# Patient Record
Sex: FEMALE | Race: WHITE | NOT HISPANIC OR LATINO | Employment: FULL TIME | URBAN - METROPOLITAN AREA
[De-identification: names, ages, dates, MRNs, and addresses within clinical notes are randomized per-mention and may not be internally consistent; named-entity substitution may affect disease eponyms.]

---

## 2017-02-02 ENCOUNTER — TELEPHONE (OUTPATIENT)
Dept: FAMILY MEDICINE | Facility: CLINIC | Age: 52
End: 2017-02-02

## 2017-02-02 DIAGNOSIS — F98.8 ADD (ATTENTION DEFICIT DISORDER): Primary | ICD-10-CM

## 2017-02-02 RX ORDER — DEXTROAMPHETAMINE SACCHARATE, AMPHETAMINE ASPARTATE MONOHYDRATE, DEXTROAMPHETAMINE SULFATE AND AMPHETAMINE SULFATE 3.75; 3.75; 3.75; 3.75 MG/1; MG/1; MG/1; MG/1
15 CAPSULE, EXTENDED RELEASE ORAL DAILY
Qty: 30 CAPSULE | Refills: 0 | Status: SHIPPED | OUTPATIENT
Start: 2017-02-02 | End: 2017-03-01

## 2017-03-01 ENCOUNTER — MYC MEDICAL ADVICE (OUTPATIENT)
Dept: FAMILY MEDICINE | Facility: CLINIC | Age: 52
End: 2017-03-01

## 2017-03-01 DIAGNOSIS — F98.8 ADD (ATTENTION DEFICIT DISORDER): ICD-10-CM

## 2017-03-01 RX ORDER — DEXTROAMPHETAMINE SACCHARATE, AMPHETAMINE ASPARTATE MONOHYDRATE, DEXTROAMPHETAMINE SULFATE AND AMPHETAMINE SULFATE 3.75; 3.75; 3.75; 3.75 MG/1; MG/1; MG/1; MG/1
15 CAPSULE, EXTENDED RELEASE ORAL DAILY
Qty: 30 CAPSULE | Refills: 0 | Status: SHIPPED | OUTPATIENT
Start: 2017-03-01 | End: 2017-04-03

## 2017-03-09 ENCOUNTER — OFFICE VISIT (OUTPATIENT)
Dept: FAMILY MEDICINE | Facility: CLINIC | Age: 52
End: 2017-03-09
Payer: COMMERCIAL

## 2017-03-09 VITALS
HEART RATE: 105 BPM | OXYGEN SATURATION: 94 % | BODY MASS INDEX: 41.62 KG/M2 | HEIGHT: 66 IN | RESPIRATION RATE: 14 BRPM | SYSTOLIC BLOOD PRESSURE: 132 MMHG | TEMPERATURE: 99 F | DIASTOLIC BLOOD PRESSURE: 86 MMHG | WEIGHT: 259 LBS

## 2017-03-09 DIAGNOSIS — E66.01 MORBID OBESITY DUE TO EXCESS CALORIES (H): ICD-10-CM

## 2017-03-09 DIAGNOSIS — L30.9 ECZEMA OF BOTH HANDS: ICD-10-CM

## 2017-03-09 DIAGNOSIS — E03.4 HYPOTHYROIDISM DUE TO ACQUIRED ATROPHY OF THYROID: ICD-10-CM

## 2017-03-09 DIAGNOSIS — F98.8 ADD (ATTENTION DEFICIT DISORDER): Primary | ICD-10-CM

## 2017-03-09 PROCEDURE — 99214 OFFICE O/P EST MOD 30 MIN: CPT | Performed by: FAMILY MEDICINE

## 2017-03-09 ASSESSMENT — ANXIETY QUESTIONNAIRES
GAD7 TOTAL SCORE: 3
1. FEELING NERVOUS, ANXIOUS, OR ON EDGE: NOT AT ALL
2. NOT BEING ABLE TO STOP OR CONTROL WORRYING: NOT AT ALL
6. BECOMING EASILY ANNOYED OR IRRITABLE: SEVERAL DAYS
3. WORRYING TOO MUCH ABOUT DIFFERENT THINGS: NOT AT ALL
5. BEING SO RESTLESS THAT IT IS HARD TO SIT STILL: SEVERAL DAYS
IF YOU CHECKED OFF ANY PROBLEMS ON THIS QUESTIONNAIRE, HOW DIFFICULT HAVE THESE PROBLEMS MADE IT FOR YOU TO DO YOUR WORK, TAKE CARE OF THINGS AT HOME, OR GET ALONG WITH OTHER PEOPLE: NOT DIFFICULT AT ALL
7. FEELING AFRAID AS IF SOMETHING AWFUL MIGHT HAPPEN: NOT AT ALL

## 2017-03-09 ASSESSMENT — PATIENT HEALTH QUESTIONNAIRE - PHQ9: 5. POOR APPETITE OR OVEREATING: SEVERAL DAYS

## 2017-03-09 NOTE — NURSING NOTE
"Chief Complaint   Patient presents with     Refill Request       Initial /86  Pulse 105  Temp 99  F (37.2  C) (Oral)  Resp 14  Ht 5' 6.25\" (1.683 m)  Wt 259 lb (117.5 kg)  SpO2 94%  BMI 41.49 kg/m2 Estimated body mass index is 41.49 kg/(m^2) as calculated from the following:    Height as of this encounter: 5' 6.25\" (1.683 m).    Weight as of this encounter: 259 lb (117.5 kg).  Medication Reconciliation: complete     Nubia Andino        "

## 2017-03-09 NOTE — MR AVS SNAPSHOT
"              After Visit Summary   3/9/2017    Brittany Rooney    MRN: [id number]           Patient Information     Date Of Birth          1965        Visit Information        Provider Department      3/9/2017 3:40 PM Esther Armstrong MD [facility]        Today's Diagnoses     Visit for screening mammogram    -  1      Care Instructions    Follow up in Summer for annual exam.    I will send Adderall monthly to pharmacy as previous.            Follow-ups after your visit        Who to contact     If you have questions or need follow up information about today's clinic visit or your schedule please contact [facility] directly at [phone].  Normal or non-critical lab and imaging results will be communicated to you by MyChart, letter or phone within 4 business days after the clinic has received the results. If you do not hear from us within 7 days, please contact the clinic through [employer] or phone. If you have a critical or abnormal lab result, we will notify you by phone as soon as possible.  Submit refill requests through [employer] or call your pharmacy and they will forward the refill request to us. Please allow 3 business days for your refill to be completed.          Additional Information About Your Visit        MyChart Information     [employer] gives you secure access to your electronic health record. If you see a primary care provider, you can also send messages to your care team and make appointments. If you have questions, please call your primary care clinic.  If you do not have a primary care provider, please call [phone] and they will assist you.        Care EveryWhere ID     This is your Care EveryWhere ID. This could be used by other organizations to access your [place] medical records  [id number]        Your Vitals Were     Pulse Temperature Respirations Height Pulse Oximetry BMI (Body Mass Index)    105 99  F (37.2  C) (Oral) 14 1.683 m (5' 6.25\") 94% " 41.49 kg/m2       Blood Pressure from Last 3 Encounters:   03/09/17 132/86   06/02/16 140/78   11/16/15 120/82    Weight from Last 3 Encounters:   03/09/17 117.5 kg (259 lb)   06/02/16 113.9 kg (251 lb 1.6 oz)   11/16/15 110 kg (242 lb 9.6 oz)              Today, you had the following     No orders found for display       Primary Care Provider Office Phone # Fax #    Esther Armstrong [phone]       [facility] [address] [place] [address] [address]  [facility] [address]        Thank you!     Thank you for choosing [facility]  for your care. Our goal is always to provide you with excellent care. Hearing back from our patients is one way we can continue to improve our services. Please take a few minutes to complete the written survey that you may receive in the mail after your visit with us. Thank you!             Your Updated Medication List - Protect others around you: Learn how to safely use, store and throw away your medicines at www.disposemymeds.org.          This list is accurate as of: 3/9/17  4:25 PM.  Always use your most recent med list.                   Brand Name Dispense Instructions for use    amphetamine-dextroamphetamine 15 MG per 24 hr capsule    ADDERALL XR    30 capsule    Take 1 capsule (15 mg) by mouth daily       clobetasol 0.05 % ointment    TEMOVATE    30 g    APPLY EXTERNALLY TO THE AFFECTED AREA TWICE DAILY       FLUoxetine 20 MG capsule    PROzac    90 capsule    TAKE ONE CAPSULE BY MOUTH EVERY DAY       ibuprofen 200 MG capsule      2 CAPSULE EVERY 4 TO 6 HOURS AS NEEDED       levothyroxine 125 MCG tablet    SYNTHROID/LEVOTHROID    90 tablet    Take 1 tablet (125 mcg) by mouth daily       mometasone 0.1 % cream    ELOCON    45 g    Apply  topically as needed.       vitamin D 2000 UNITS tablet     100 tablet    Take 1 tablet by mouth 2 times daily.

## 2017-03-09 NOTE — PROGRESS NOTES
"  SUBJECTIVE:                                                    Brittany Rooney is a 51 year old female who presents to clinic today for the following health issues:      Hyperlipidemia Follow-Up      Rate your low fat/cholesterol diet?: good    Taking statin?  No    Other lipid medications/supplements?:  none             Hypothyroidism Follow-up      Since last visit, patient describes the following symptoms: dry skin - thinks winter weather- primarily hands uses temovate ointment with good results       Amount of exercise or physical activity: None    Problems taking medications regularly: No    Medication side effects: none  Diet: regular (no restrictions)    Anxiety Follow-Up    Status since last visit: No change    Other associated symptoms:None    Complicating factors:   Significant life event: No   Current substance abuse: None  Depression symptoms: No  GALE-7 SCORE 4/22/2015 11/16/2015 3/9/2017   Total Score 11 - -   Total Score - 8 3      PHQ-9 SCORE 4/22/2015 11/16/2015 3/9/2017   Total Score 2 - -   Total Score - 1 2     GAD7                   Problem list and histories reviewed & adjusted, as indicated.  Additional history: as documented    BP Readings from Last 3 Encounters:   03/09/17 132/86   06/02/16 140/78   11/16/15 120/82    Wt Readings from Last 3 Encounters:   03/09/17 259 lb (117.5 kg)   06/02/16 251 lb 1.6 oz (113.9 kg)   11/16/15 242 lb 9.6 oz (110 kg)                    Reviewed and updated as needed this visit by clinical staff  Tobacco  Allergies  Meds  Med Hx  Surg Hx  Fam Hx  Soc Hx      Reviewed and updated as needed this visit by Provider  Tobacco  Allergies  Meds  Problems  Med Hx  Surg Hx  Fam Hx  Soc Hx          ROS:  Constitutional, HEENT, cardiovascular, pulmonary, gi and gu systems are negative, except as otherwise noted.    OBJECTIVE:                                                    /86  Pulse 105  Temp 99  F (37.2  C) (Oral)  Resp 14  Ht 5' 6.25\" (1.683 " "m)  Wt 259 lb (117.5 kg)  SpO2 94%  BMI 41.49 kg/m2  Body mass index is 41.49 kg/(m^2).  GENERAL: alert, no distress and obese  NECK: no adenopathy, no asymmetry, masses, or scars and thyroid normal to palpation  RESP: lungs clear to auscultation - no rales, rhonchi or wheezes  CV: regular rate and rhythm, normal S1 S2, no S3 or S4, no murmur, click or rub, no peripheral edema and peripheral pulses strong  MS: no gross musculoskeletal defects noted, no edema  SKIN: no suspicious lesions or rashes and hands with cracking, dryness, no moist drainage, no scaling.   PSYCH: mentation appears normal, affect normal/bright, fatigued, judgement and insight intact and appearance well groomed         ASSESSMENT/PLAN:                                                        BMI:   Estimated body mass index is 41.49 kg/(m^2) as calculated from the following:    Height as of this encounter: 1.683 m (5' 6.25\").    Weight as of this encounter: 117.5 kg (259 lb).   Weight management plan: Discussed healthy diet and exercise guidelines and patient will follow up in 6 months in clinic to re-evaluate.      1. ADD (attention deficit disorder)  On adderall XR - tolerated well.  Follow up in 6 months.  Monthly scripts will be sent.     2. Hypothyroidism due to acquired atrophy of thyroid  Due for recheck labs with annual in June    3. Morbid obesity due to excess calories (H)  Increasing weight noted.  Consider medication if diet and exercise not effective by annual exam.     4. Eczema of both hands  Temovate cream prn       Patient Instructions   Follow up in Summer for annual exam.    I will send Adderall monthly to pharmacy as previous.          Esther Armstrong MD  [facility]"

## 2017-03-10 PROBLEM — E66.01 MORBID OBESITY DUE TO EXCESS CALORIES (H): Status: ACTIVE | Noted: 2017-03-10

## 2017-03-10 ASSESSMENT — ANXIETY QUESTIONNAIRES: GAD7 TOTAL SCORE: 3

## 2017-03-10 ASSESSMENT — PATIENT HEALTH QUESTIONNAIRE - PHQ9: SUM OF ALL RESPONSES TO PHQ QUESTIONS 1-9: 2

## 2017-04-03 ENCOUNTER — MYC MEDICAL ADVICE (OUTPATIENT)
Dept: FAMILY MEDICINE | Facility: CLINIC | Age: 52
End: 2017-04-03

## 2017-04-03 DIAGNOSIS — F98.8 ADD (ATTENTION DEFICIT DISORDER): ICD-10-CM

## 2017-04-03 RX ORDER — DEXTROAMPHETAMINE SACCHARATE, AMPHETAMINE ASPARTATE MONOHYDRATE, DEXTROAMPHETAMINE SULFATE AND AMPHETAMINE SULFATE 3.75; 3.75; 3.75; 3.75 MG/1; MG/1; MG/1; MG/1
15 CAPSULE, EXTENDED RELEASE ORAL DAILY
Qty: 30 CAPSULE | Refills: 0 | Status: SHIPPED | OUTPATIENT
Start: 2017-04-03 | End: 2017-05-02

## 2017-05-02 ENCOUNTER — MYC MEDICAL ADVICE (OUTPATIENT)
Dept: FAMILY MEDICINE | Facility: CLINIC | Age: 52
End: 2017-05-02

## 2017-05-02 DIAGNOSIS — F98.8 ADD (ATTENTION DEFICIT DISORDER): ICD-10-CM

## 2017-05-02 RX ORDER — DEXTROAMPHETAMINE SACCHARATE, AMPHETAMINE ASPARTATE MONOHYDRATE, DEXTROAMPHETAMINE SULFATE AND AMPHETAMINE SULFATE 3.75; 3.75; 3.75; 3.75 MG/1; MG/1; MG/1; MG/1
15 CAPSULE, EXTENDED RELEASE ORAL DAILY
Qty: 30 CAPSULE | Refills: 0 | Status: SHIPPED | OUTPATIENT
Start: 2017-05-02 | End: 2017-06-01

## 2017-06-01 ENCOUNTER — MYC MEDICAL ADVICE (OUTPATIENT)
Dept: FAMILY MEDICINE | Facility: CLINIC | Age: 52
End: 2017-06-01

## 2017-06-01 DIAGNOSIS — F98.8 ADD (ATTENTION DEFICIT DISORDER): ICD-10-CM

## 2017-06-01 RX ORDER — DEXTROAMPHETAMINE SACCHARATE, AMPHETAMINE ASPARTATE MONOHYDRATE, DEXTROAMPHETAMINE SULFATE AND AMPHETAMINE SULFATE 3.75; 3.75; 3.75; 3.75 MG/1; MG/1; MG/1; MG/1
15 CAPSULE, EXTENDED RELEASE ORAL DAILY
Qty: 30 CAPSULE | Refills: 0 | Status: SHIPPED | OUTPATIENT
Start: 2017-06-01 | End: 2017-07-03

## 2017-06-03 DIAGNOSIS — E03.4 HYPOTHYROIDISM DUE TO ACQUIRED ATROPHY OF THYROID: ICD-10-CM

## 2017-06-05 RX ORDER — LEVOTHYROXINE SODIUM 125 UG/1
TABLET ORAL
Qty: 30 TABLET | Refills: 0 | Status: SHIPPED | OUTPATIENT
Start: 2017-06-05 | End: 2017-08-04

## 2017-06-05 NOTE — TELEPHONE ENCOUNTER
Medication is being filled for 1 time refill only due to:  Patient needs labs TSH. Janet Rebollar RN

## 2017-06-05 NOTE — TELEPHONE ENCOUNTER
levothyroxine (SYNTHROID, LEVOTHROID) 125 MCG tablet     Last Written Prescription Date: 6/2/16  Last Quantity: 90, # refills: 3  Last Office Visit with [facility], [facility] or [facility] prescribing provider: 3/9/17 Dr. Armstrong          TSH   Date Value Ref Range Status   06/02/2016 1.46 0.40 - 4.00 mU/L Final

## 2017-06-13 DIAGNOSIS — L30.9 ECZEMA OF BOTH HANDS: ICD-10-CM

## 2017-06-13 NOTE — TELEPHONE ENCOUNTER
clobetasol (TEMOVATE) 0.05 % ointment      Last Written Prescription Date: 12/22/16  Last Fill Quantity: 30g,  # refills: 0   Last Office Visit with [facility], P or [facility] prescribing provider: 3/9/17 Dr. Armstrong

## 2017-06-16 RX ORDER — CLOBETASOL PROPIONATE 0.5 MG/G
OINTMENT TOPICAL
Qty: 30 G | Refills: 0 | Status: SHIPPED | OUTPATIENT
Start: 2017-06-16 | End: 2017-12-05

## 2017-06-16 NOTE — TELEPHONE ENCOUNTER
Last OV notes reviewed, due for annual exam this month.      Medication is being filled for 1 time mildred refill only due to:  Patient due to be seen in clinic for provider visit.    Nael Obrien RN

## 2017-07-03 ENCOUNTER — MYC MEDICAL ADVICE (OUTPATIENT)
Dept: FAMILY MEDICINE | Facility: CLINIC | Age: 52
End: 2017-07-03

## 2017-07-03 DIAGNOSIS — F98.8 ATTENTION DEFICIT DISORDER (ADD) WITHOUT HYPERACTIVITY: Primary | ICD-10-CM

## 2017-07-03 RX ORDER — DEXTROAMPHETAMINE SACCHARATE, AMPHETAMINE ASPARTATE MONOHYDRATE, DEXTROAMPHETAMINE SULFATE AND AMPHETAMINE SULFATE 3.75; 3.75; 3.75; 3.75 MG/1; MG/1; MG/1; MG/1
15 CAPSULE, EXTENDED RELEASE ORAL DAILY
Qty: 30 CAPSULE | Refills: 0 | Status: SHIPPED | OUTPATIENT
Start: 2017-07-03 | End: 2017-08-01

## 2017-08-01 ENCOUNTER — MYC MEDICAL ADVICE (OUTPATIENT)
Dept: FAMILY MEDICINE | Facility: CLINIC | Age: 52
End: 2017-08-01

## 2017-08-01 DIAGNOSIS — F98.8 ATTENTION DEFICIT DISORDER (ADD) WITHOUT HYPERACTIVITY: ICD-10-CM

## 2017-08-01 RX ORDER — DEXTROAMPHETAMINE SACCHARATE, AMPHETAMINE ASPARTATE MONOHYDRATE, DEXTROAMPHETAMINE SULFATE AND AMPHETAMINE SULFATE 3.75; 3.75; 3.75; 3.75 MG/1; MG/1; MG/1; MG/1
15 CAPSULE, EXTENDED RELEASE ORAL DAILY
Qty: 30 CAPSULE | Refills: 0 | Status: SHIPPED | OUTPATIENT
Start: 2017-08-01 | End: 2017-08-31

## 2017-08-01 NOTE — TELEPHONE ENCOUNTER
Rx mailed to [facility].    Gina STEVENSON, Patient Care      Followed protocol

## 2017-08-04 ENCOUNTER — MYC REFILL (OUTPATIENT)
Dept: FAMILY MEDICINE | Facility: CLINIC | Age: 52
End: 2017-08-04

## 2017-08-04 DIAGNOSIS — E03.4 HYPOTHYROIDISM DUE TO ACQUIRED ATROPHY OF THYROID: ICD-10-CM

## 2017-08-04 RX ORDER — LEVOTHYROXINE SODIUM 125 UG/1
125 TABLET ORAL DAILY
Qty: 30 TABLET | Refills: 0 | Status: CANCELLED | OUTPATIENT
Start: 2017-08-04

## 2017-08-04 NOTE — TELEPHONE ENCOUNTER
Message from MyChart:  Original authorizing provider: MD ANJELICA Ferrara would like a refill of the following medications:  levothyroxine (SYNTHROID/LEVOTHROID) 125 MCG tablet [Esther Armstrong MD]    Preferred pharmacy: [facility] [address] [place] [address] [address] AT [facility] [address]    Comment:

## 2017-08-31 ENCOUNTER — MYC MEDICAL ADVICE (OUTPATIENT)
Dept: FAMILY MEDICINE | Facility: CLINIC | Age: 52
End: 2017-08-31

## 2017-08-31 DIAGNOSIS — F98.8 ATTENTION DEFICIT DISORDER (ADD) WITHOUT HYPERACTIVITY: ICD-10-CM

## 2017-08-31 RX ORDER — DEXTROAMPHETAMINE SACCHARATE, AMPHETAMINE ASPARTATE MONOHYDRATE, DEXTROAMPHETAMINE SULFATE AND AMPHETAMINE SULFATE 3.75; 3.75; 3.75; 3.75 MG/1; MG/1; MG/1; MG/1
15 CAPSULE, EXTENDED RELEASE ORAL DAILY
Qty: 30 CAPSULE | Refills: 0 | Status: SHIPPED | OUTPATIENT
Start: 2017-08-31 | End: 2017-10-03

## 2017-09-04 DIAGNOSIS — E03.4 HYPOTHYROIDISM DUE TO ACQUIRED ATROPHY OF THYROID: ICD-10-CM

## 2017-09-04 RX ORDER — LEVOTHYROXINE SODIUM 125 UG/1
TABLET ORAL
Qty: 30 TABLET | Refills: 0 | Status: CANCELLED | OUTPATIENT
Start: 2017-09-04

## 2017-09-05 NOTE — TELEPHONE ENCOUNTER
levothyroxine (SYNTHROID/LEVOTHROID) 125 MCG tablet     Last Written Prescription Date: 8/4/17  Last Quantity: 30, # refills: 0  Last Office Visit with [facility], P or [facility] prescribing provider: 3/9/17 Dr. Armstrong        TSH   Date Value Ref Range Status   06/02/2016 1.46 0.40 - 4.00 mU/L Final

## 2017-09-06 ENCOUNTER — MYC MEDICAL ADVICE (OUTPATIENT)
Dept: FAMILY MEDICINE | Facility: CLINIC | Age: 52
End: 2017-09-06

## 2017-09-06 DIAGNOSIS — E03.4 HYPOTHYROIDISM DUE TO ACQUIRED ATROPHY OF THYROID: ICD-10-CM

## 2017-09-07 RX ORDER — LEVOTHYROXINE SODIUM 125 UG/1
125 TABLET ORAL DAILY
Qty: 30 TABLET | Refills: 0 | Status: SHIPPED | OUTPATIENT
Start: 2017-09-07 | End: 2017-10-10

## 2017-09-07 NOTE — TELEPHONE ENCOUNTER
levothyroxine (SYNTHROID/LEVOTHROID) 125 MCG tablet     Last Written Prescription Date: 8/4/17  Last Quantity: 30, # refills: 0  Last Office Visit with [facility], [facility] or [facility] prescribing provider: Nathaniel   Next 5 appointments (look out 90 days)     Sep 19, 2017  7:20 AM CDT   Hilary Hercules [facility]    [address] [place] [id number] [phone]                   TSH   Date Value Ref Range Status   06/02/2016 1.46 0.40 - 4.00 mU/L Final     Patient is due for labs and an appointment. Scheduled to be seen 9/16/17, labs are entered to have done prior to appointment.  Gabbie refill sent for #30 with 0 refills. Patient needs to be seen before further refills.  Janet Morales RN.

## 2017-10-03 ENCOUNTER — MYC MEDICAL ADVICE (OUTPATIENT)
Dept: FAMILY MEDICINE | Facility: CLINIC | Age: 52
End: 2017-10-03

## 2017-10-03 DIAGNOSIS — F98.8 ATTENTION DEFICIT DISORDER (ADD) WITHOUT HYPERACTIVITY: ICD-10-CM

## 2017-10-03 RX ORDER — DEXTROAMPHETAMINE SACCHARATE, AMPHETAMINE ASPARTATE MONOHYDRATE, DEXTROAMPHETAMINE SULFATE AND AMPHETAMINE SULFATE 3.75; 3.75; 3.75; 3.75 MG/1; MG/1; MG/1; MG/1
15 CAPSULE, EXTENDED RELEASE ORAL DAILY
Qty: 30 CAPSULE | Refills: 0 | Status: SHIPPED | OUTPATIENT
Start: 2017-10-03 | End: 2017-11-02

## 2017-10-10 ENCOUNTER — OFFICE VISIT (OUTPATIENT)
Dept: FAMILY MEDICINE | Facility: CLINIC | Age: 52
End: 2017-10-10
Payer: COMMERCIAL

## 2017-10-10 VITALS
WEIGHT: 259 LBS | HEIGHT: 66 IN | RESPIRATION RATE: 17 BRPM | DIASTOLIC BLOOD PRESSURE: 82 MMHG | BODY MASS INDEX: 41.62 KG/M2 | HEART RATE: 62 BPM | SYSTOLIC BLOOD PRESSURE: 128 MMHG | TEMPERATURE: 98.8 F | OXYGEN SATURATION: 99 %

## 2017-10-10 DIAGNOSIS — M76.61 RIGHT ACHILLES TENDINITIS: ICD-10-CM

## 2017-10-10 DIAGNOSIS — F43.22 ADJUSTMENT DISORDER WITH ANXIETY: ICD-10-CM

## 2017-10-10 DIAGNOSIS — Z00.00 ROUTINE GENERAL MEDICAL EXAMINATION AT A HEALTH CARE FACILITY: Primary | ICD-10-CM

## 2017-10-10 DIAGNOSIS — D23.9 DERMATOFIBROMA: ICD-10-CM

## 2017-10-10 DIAGNOSIS — F98.8 ATTENTION DEFICIT DISORDER (ADD) WITHOUT HYPERACTIVITY: ICD-10-CM

## 2017-10-10 DIAGNOSIS — E78.5 HYPERLIPIDEMIA LDL GOAL <130: ICD-10-CM

## 2017-10-10 DIAGNOSIS — Z12.31 VISIT FOR SCREENING MAMMOGRAM: ICD-10-CM

## 2017-10-10 DIAGNOSIS — Z12.11 SCREEN FOR COLON CANCER: ICD-10-CM

## 2017-10-10 DIAGNOSIS — E03.4 HYPOTHYROIDISM DUE TO ACQUIRED ATROPHY OF THYROID: ICD-10-CM

## 2017-10-10 LAB
ALBUMIN SERPL-MCNC: 3.6 G/DL (ref 3.4–5)
ALP SERPL-CCNC: 60 U/L (ref 40–150)
ALT SERPL W P-5'-P-CCNC: 22 U/L (ref 0–50)
ANION GAP SERPL CALCULATED.3IONS-SCNC: 5 MMOL/L (ref 3–14)
AST SERPL W P-5'-P-CCNC: 12 U/L (ref 0–45)
BILIRUB SERPL-MCNC: 0.4 MG/DL (ref 0.2–1.3)
BUN SERPL-MCNC: 14 MG/DL (ref 7–30)
CALCIUM SERPL-MCNC: 8.3 MG/DL (ref 8.5–10.1)
CHLORIDE SERPL-SCNC: 106 MMOL/L (ref 94–109)
CHOLEST SERPL-MCNC: 183 MG/DL
CO2 SERPL-SCNC: 28 MMOL/L (ref 20–32)
CREAT SERPL-MCNC: 0.62 MG/DL (ref 0.52–1.04)
GFR SERPL CREATININE-BSD FRML MDRD: >90 ML/MIN/1.7M2
GLUCOSE SERPL-MCNC: 98 MG/DL (ref 70–99)
HDLC SERPL-MCNC: 54 MG/DL
HGB BLD-MCNC: 12.8 G/DL (ref 11.7–15.7)
LDLC SERPL CALC-MCNC: 109 MG/DL
NONHDLC SERPL-MCNC: 129 MG/DL
POTASSIUM SERPL-SCNC: 4.1 MMOL/L (ref 3.4–5.3)
PROT SERPL-MCNC: 6.9 G/DL (ref 6.8–8.8)
SODIUM SERPL-SCNC: 139 MMOL/L (ref 133–144)
TRIGL SERPL-MCNC: 102 MG/DL
TSH SERPL DL<=0.005 MIU/L-ACNC: 1.51 MU/L (ref 0.4–4)

## 2017-10-10 PROCEDURE — 80053 COMPREHEN METABOLIC PANEL: CPT | Performed by: FAMILY MEDICINE

## 2017-10-10 PROCEDURE — 80061 LIPID PANEL: CPT | Performed by: FAMILY MEDICINE

## 2017-10-10 PROCEDURE — 99396 PREV VISIT EST AGE 40-64: CPT | Performed by: FAMILY MEDICINE

## 2017-10-10 PROCEDURE — 85018 HEMOGLOBIN: CPT | Performed by: FAMILY MEDICINE

## 2017-10-10 PROCEDURE — 84443 ASSAY THYROID STIM HORMONE: CPT | Performed by: FAMILY MEDICINE

## 2017-10-10 PROCEDURE — 36415 COLL VENOUS BLD VENIPUNCTURE: CPT | Performed by: FAMILY MEDICINE

## 2017-10-10 RX ORDER — LEVOTHYROXINE SODIUM 125 UG/1
125 TABLET ORAL DAILY
Qty: 90 TABLET | Refills: 3 | Status: SHIPPED | OUTPATIENT
Start: 2017-10-10 | End: 2018-10-26

## 2017-10-10 RX ORDER — LEVOTHYROXINE SODIUM 125 UG/1
125 TABLET ORAL DAILY
Qty: 30 TABLET | Refills: 0 | Status: SHIPPED | OUTPATIENT
Start: 2017-10-10 | End: 2017-10-10

## 2017-10-10 ASSESSMENT — PATIENT HEALTH QUESTIONNAIRE - PHQ9
SUM OF ALL RESPONSES TO PHQ QUESTIONS 1-9: 1
5. POOR APPETITE OR OVEREATING: SEVERAL DAYS

## 2017-10-10 ASSESSMENT — ANXIETY QUESTIONNAIRES
7. FEELING AFRAID AS IF SOMETHING AWFUL MIGHT HAPPEN: SEVERAL DAYS
5. BEING SO RESTLESS THAT IT IS HARD TO SIT STILL: SEVERAL DAYS
1. FEELING NERVOUS, ANXIOUS, OR ON EDGE: SEVERAL DAYS
2. NOT BEING ABLE TO STOP OR CONTROL WORRYING: MORE THAN HALF THE DAYS
GAD7 TOTAL SCORE: 9
6. BECOMING EASILY ANNOYED OR IRRITABLE: SEVERAL DAYS
IF YOU CHECKED OFF ANY PROBLEMS ON THIS QUESTIONNAIRE, HOW DIFFICULT HAVE THESE PROBLEMS MADE IT FOR YOU TO DO YOUR WORK, TAKE CARE OF THINGS AT HOME, OR GET ALONG WITH OTHER PEOPLE: SOMEWHAT DIFFICULT
3. WORRYING TOO MUCH ABOUT DIFFERENT THINGS: MORE THAN HALF THE DAYS

## 2017-10-10 ASSESSMENT — PAIN SCALES - GENERAL: PAINLEVEL: NO PAIN (0)

## 2017-10-10 NOTE — PROGRESS NOTES
Your thyroid testing is normal.   Your cholesterol is improved from previous. It is in the goal range.    Your blood sugar is normal.  Your kidney function and liver function are normal.   Your calcium is borderline low.  Be sure you are taking vitamin D for the fall and winter.   Your hemoglobin is normal.  Please call or MyChart message me if you have any questions.    PSK

## 2017-10-10 NOTE — MR AVS SNAPSHOT
After Visit Summary   10/10/2017    Brittany Rooney    MRN: [id number]           Patient Information     Date Of Birth          1965        Visit Information        Provider Department      10/10/2017 7:20 AM Esther Armstrong MD [facility]        Today's Diagnoses     Routine general medical examination at a health care facility    -  1    Hypothyroidism due to acquired atrophy of thyroid        Screen for colon cancer        Visit for screening mammogram        Hyperlipidemia LDL goal <130        Attention deficit disorder (ADD) without hyperactivity        Adjustment disorder with anxiety        Dermatofibroma        Right Achilles tendinitis          Care Instructions    Fasting labs.  Refill thyroid medication for 1 month now - additional refills after labs return.  Mammogram recommended.   You can call [phone] to schedule this at the [facility]    Colonoscopy recommended.   You can call [phone] to schedule this at the [facility]      Stretching exercises heel recommended.  Consider PHYSICAL THERAPY if persistent symptoms.    You can return to clinic for lesion removal left knee area if desired.          Preventive Health Recommendations  Female Ages 50 - 64    Yearly exam: See your health care provider every year in order to  o Review health changes.   o Discuss preventive care.    o Review your medicines if your doctor has prescribed any.      Get a Pap test every three years (unless you have an abnormal result and your provider advises testing more often).    If you get Pap tests with HPV test, you only need to test every 5 years, unless you have an abnormal result.     You do not need a Pap test if your uterus was removed (hysterectomy) and you have not had cancer.    You should be tested each year  for STDs (sexually transmitted diseases) if you're at risk.     Have a mammogram every 1 to 2 years.    Have a colonoscopy at age 50, or have a yearly FIT test (stool test). These exams screen for colon cancer.      Have a cholesterol test every 5 years, or more often if advised.    Have a diabetes test (fasting glucose) every three years. If you are at risk for diabetes, you should have this test more often.     If you are at risk for osteoporosis (brittle bone disease), think about having a bone density scan (DEXA).    Shots: Get a flu shot each year. Get a tetanus shot every 10 years.    Nutrition:     Eat at least 5 servings of fruits and vegetables each day.    Eat whole-grain bread, whole-wheat pasta and brown rice instead of white grains and rice.    Talk to your provider about Calcium and Vitamin D.     Lifestyle    Exercise at least 150 minutes a week (30 minutes a day, 5 days a week). This will help you control your weight and prevent disease.    Limit alcohol to one drink per day.    No smoking.     Wear sunscreen to prevent skin cancer.     See your dentist every six months for an exam and cleaning.    See your eye doctor every 1 to 2 years.            Follow-ups after your visit        Additional Services     GASTROENTEROLOGY ADULT REF PROCEDURE ONLY       Last Lab Result: Creatinine (mg/dL)       Date                     Value                 06/02/2016               0.64             ----------  Body mass index is 41.49 kg/(m^2).     Needed:  No  Language:  English    Patient will be contacted to schedule procedure.     Please be aware that coverage of these services is subject to the terms and limitations of your health insurance plan.  Call member services at your health plan with any benefit or coverage questions.  Any procedures must be performed at a [facility] facility OR coordinated by your clinic's referral office.    Please bring the following with you to your appointment:    (1) Any  "X-Rays, CTs or MRIs which have been performed.  Contact the facility where they were done to arrange for  prior to your scheduled appointment.    (2) List of current medications   (3) This referral request   (4) Any documents/labs given to you for this referral                  Future tests that were ordered for you today     Open Future Orders        Priority Expected Expires Ordered    MA SCREENING DIGITAL BILAT - Future  (s+30) Routine  10/10/2018 10/10/2017            Who to contact     If you have questions or need follow up information about today's clinic visit or your schedule please contact [facility] directly at [phone].  Normal or non-critical lab and imaging results will be communicated to you by MyChart, letter or phone within 4 business days after the clinic has received the results. If you do not hear from us within 7 days, please contact the clinic through [employer] or phone. If you have a critical or abnormal lab result, we will notify you by phone as soon as possible.  Submit refill requests through [employer] or call your pharmacy and they will forward the refill request to us. Please allow 3 business days for your refill to be completed.          Additional Information About Your Visit        [employer] Information     [employer] gives you secure access to your electronic health record. If you see a primary care provider, you can also send messages to your care team and make appointments. If you have questions, please call your primary care clinic.  If you do not have a primary care provider, please call [phone] and they will assist you.        Care EveryWhere ID     This is your Care EveryWhere ID. This could be used by other organizations to access your [place] medical records  [id number]        Your Vitals Were     Pulse Temperature Respirations Height Last Period Pulse Oximetry    62 98.8  F (37.1  C) (Oral) 17 1.683 m (5' 6.25\") 09/22/2017 (Approximate) 99%    " Breastfeeding? BMI (Body Mass Index)                No 41.49 kg/m2           Blood Pressure from Last 3 Encounters:   10/10/17 128/82   03/09/17 132/86   06/02/16 140/78    Weight from Last 3 Encounters:   10/10/17 117.5 kg (259 lb)   03/09/17 117.5 kg (259 lb)   06/02/16 113.9 kg (251 lb 1.6 oz)              We Performed the Following     Comprehensive metabolic panel (BMP + Alb, Alk Phos, ALT, AST, Total. Bili, TP)     GASTROENTEROLOGY ADULT REF PROCEDURE ONLY     Hemoglobin     Lipid panel reflex to direct LDL     TSH WITH FREE T4 REFLEX          Where to get your medicines      These medications were sent to [id number] - [place], [place] - [address] AT [facility] [address] [facility] [address]     Phone:  [phone]     levothyroxine 125 MCG tablet          Primary Care Provider Office Phone # Fax #    Esther Armstrong [phone] [address] [facility] [address]  [facility] [address]        Equal Access to Services     [facility]: Hadii aad ku hadasho Soomaali, waaxda luqadaha, qaybta kaalmada adeegyada, misael gaspar hayaishan carleen bautista . So [facility] [phone].    ATENCIÓN: Si habla español, tiene a dumont disposición servicios gratuitos de asistencia lingüística. [facility] [phone].    We comply with applicable federal civil rights laws and [place] laws. We do not discriminate on the basis of race, color, national origin, age, disability, sex, sexual orientation, or gender identity.            Thank you!     Thank you for choosing [facility]  for your care. Our goal is always to provide you with excellent care. Hearing back from our patients is one way we can continue to improve our services. Please take a few minutes to complete the written survey that you may receive in the mail after your visit with us. Thank you!             Your Updated Medication List - Protect others around you: Learn how to safely use,  store and throw away your medicines at www.disposemymeds.org.          This list is accurate as of: 10/10/17  8:06 AM.  Always use your most recent med list.                   Brand Name Dispense Instructions for use Diagnosis    amphetamine-dextroamphetamine 15 MG per 24 hr capsule    ADDERALL XR    30 capsule    Take 1 capsule (15 mg) by mouth daily    Attention deficit disorder (ADD) without hyperactivity       clobetasol 0.05 % ointment    TEMOVATE    30 g    APPLY EXTERNALLY TO THE AFFECTED AREA TWICE DAILY    Eczema of both hands       FLUoxetine 20 MG capsule    PROzac    90 capsule    TAKE ONE CAPSULE BY MOUTH EVERY DAY    Adjustment disorder with anxiety       ibuprofen 200 MG capsule      2 CAPSULE EVERY 4 TO 6 HOURS AS NEEDED    Other malaise and fatigue       levothyroxine 125 MCG tablet    SYNTHROID/LEVOTHROID    30 tablet    Take 1 tablet (125 mcg) by mouth daily    Hypothyroidism due to acquired atrophy of thyroid       mometasone 0.1 % cream    ELOCON    45 g    Apply  topically as needed.    Eczema       vitamin D 2000 UNITS tablet     100 tablet    Take 1 tablet by mouth 2 times daily.    Vitamin D deficiencies

## 2017-10-10 NOTE — NURSING NOTE
"Chief Complaint   Patient presents with     Physical       Initial /82 (BP Location: Right arm, Patient Position: Chair, Cuff Size: Adult Large)  Pulse 62  Temp 98.8  F (37.1  C) (Oral)  Resp 17  Ht 1.683 m (5' 6.25\")  Wt 117.5 kg (259 lb)  LMP 09/22/2017 (Approximate)  SpO2 99%  Breastfeeding? No  BMI 41.49 kg/m2 Estimated body mass index is 41.49 kg/(m^2) as calculated from the following:    Height as of this encounter: 1.683 m (5' 6.25\").    Weight as of this encounter: 117.5 kg (259 lb).  Medication Reconciliation: complete     Aishwarya Cronin MA       "

## 2017-10-10 NOTE — PATIENT INSTRUCTIONS
Fasting labs.  Refill thyroid medication for 1 month now - additional refills after labs return.  Mammogram recommended.   You can call [phone] to schedule this at the [facility]    Colonoscopy recommended.   You can call [phone] to schedule this at the [facility]      Stretching exercises heel recommended.  Consider PHYSICAL THERAPY if persistent symptoms.    You can return to clinic for lesion removal left knee area if desired.          Preventive Health Recommendations  Female Ages 50 - 64    Yearly exam: See your health care provider every year in order to  o Review health changes.   o Discuss preventive care.    o Review your medicines if your doctor has prescribed any.      Get a Pap test every three years (unless you have an abnormal result and your provider advises testing more often).    If you get Pap tests with HPV test, you only need to test every 5 years, unless you have an abnormal result.     You do not need a Pap test if your uterus was removed (hysterectomy) and you have not had cancer.    You should be tested each year for STDs (sexually transmitted diseases) if you're at risk.     Have a mammogram every 1 to 2 years.    Have a colonoscopy at age 50, or have a yearly FIT test (stool test). These exams screen for colon cancer.      Have a cholesterol test every 5 years, or more often if advised.    Have a diabetes test (fasting glucose) every three years. If you are at risk for diabetes, you should have this test more often.     If you are at risk for osteoporosis (brittle bone disease), think about having a bone density scan (DEXA).    Shots: Get a flu shot each year. Get a tetanus shot every 10 years.    Nutrition:     Eat at least 5 servings of fruits and vegetables each day.    Eat whole-grain bread, whole-wheat pasta and  brown rice instead of white grains and rice.    Talk to your provider about Calcium and Vitamin D.     Lifestyle    Exercise at least 150 minutes a week (30 minutes a day, 5 days a week). This will help you control your weight and prevent disease.    Limit alcohol to one drink per day.    No smoking.     Wear sunscreen to prevent skin cancer.     See your dentist every six months for an exam and cleaning.    See your eye doctor every 1 to 2 years.
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
## 2017-10-11 ASSESSMENT — ANXIETY QUESTIONNAIRES: GAD7 TOTAL SCORE: 9

## 2017-11-02 ENCOUNTER — MYC MEDICAL ADVICE (OUTPATIENT)
Dept: FAMILY MEDICINE | Facility: CLINIC | Age: 52
End: 2017-11-02

## 2017-11-02 DIAGNOSIS — F98.8 ATTENTION DEFICIT DISORDER (ADD) WITHOUT HYPERACTIVITY: ICD-10-CM

## 2017-11-02 RX ORDER — DEXTROAMPHETAMINE SACCHARATE, AMPHETAMINE ASPARTATE MONOHYDRATE, DEXTROAMPHETAMINE SULFATE AND AMPHETAMINE SULFATE 3.75; 3.75; 3.75; 3.75 MG/1; MG/1; MG/1; MG/1
15 CAPSULE, EXTENDED RELEASE ORAL DAILY
Qty: 30 CAPSULE | Refills: 0 | Status: SHIPPED | OUTPATIENT
Start: 2017-11-02 | End: 2017-11-30

## 2017-11-02 NOTE — TELEPHONE ENCOUNTER
Rx mailed to [facility] in [place].    Gina STEVENSON, Patient Care

## 2017-11-14 ENCOUNTER — RADIANT APPOINTMENT (OUTPATIENT)
Dept: MAMMOGRAPHY | Facility: CLINIC | Age: 52
End: 2017-11-14
Attending: FAMILY MEDICINE
Payer: COMMERCIAL

## 2017-11-14 DIAGNOSIS — Z12.31 VISIT FOR SCREENING MAMMOGRAM: ICD-10-CM

## 2017-11-14 PROCEDURE — G0202 SCR MAMMO BI INCL CAD: HCPCS

## 2017-11-30 ENCOUNTER — MYC MEDICAL ADVICE (OUTPATIENT)
Dept: FAMILY MEDICINE | Facility: CLINIC | Age: 52
End: 2017-11-30

## 2017-11-30 DIAGNOSIS — F98.8 ATTENTION DEFICIT DISORDER (ADD) WITHOUT HYPERACTIVITY: ICD-10-CM

## 2017-11-30 RX ORDER — DEXTROAMPHETAMINE SACCHARATE, AMPHETAMINE ASPARTATE MONOHYDRATE, DEXTROAMPHETAMINE SULFATE AND AMPHETAMINE SULFATE 3.75; 3.75; 3.75; 3.75 MG/1; MG/1; MG/1; MG/1
15 CAPSULE, EXTENDED RELEASE ORAL DAILY
Qty: 30 CAPSULE | Refills: 0 | Status: SHIPPED | OUTPATIENT
Start: 2017-11-30 | End: 2017-12-28

## 2017-12-05 DIAGNOSIS — L30.9 ECZEMA OF BOTH HANDS: ICD-10-CM

## 2017-12-05 RX ORDER — CLOBETASOL PROPIONATE 0.5 MG/G
OINTMENT TOPICAL
Qty: 30 G | Refills: 0 | Status: SHIPPED | OUTPATIENT
Start: 2017-12-05 | End: 2018-01-31

## 2017-12-05 NOTE — TELEPHONE ENCOUNTER
clobetasol (TEMOVATE) 0.05 % ointment      Last Written Prescription Date:  06/16/17  Last Fill Quantity: 30g,   # refills: 0  Last Office Visit: 10/10/17 Dr. Armstrong  Future Office visit:       Routing refill request to provider for review/approval because:  Drug not on the [facility], [facility] or [facility] refill protocol or controlled substance

## 2017-12-28 ENCOUNTER — MYC MEDICAL ADVICE (OUTPATIENT)
Dept: FAMILY MEDICINE | Facility: CLINIC | Age: 52
End: 2017-12-28

## 2017-12-28 DIAGNOSIS — F98.8 ATTENTION DEFICIT DISORDER (ADD) WITHOUT HYPERACTIVITY: ICD-10-CM

## 2017-12-28 RX ORDER — DEXTROAMPHETAMINE SACCHARATE, AMPHETAMINE ASPARTATE MONOHYDRATE, DEXTROAMPHETAMINE SULFATE AND AMPHETAMINE SULFATE 3.75; 3.75; 3.75; 3.75 MG/1; MG/1; MG/1; MG/1
15 CAPSULE, EXTENDED RELEASE ORAL DAILY
Qty: 30 CAPSULE | Refills: 0 | Status: SHIPPED | OUTPATIENT
Start: 2017-12-28 | End: 2018-01-30

## 2018-01-30 ENCOUNTER — MYC MEDICAL ADVICE (OUTPATIENT)
Dept: FAMILY MEDICINE | Facility: CLINIC | Age: 53
End: 2018-01-30

## 2018-01-30 DIAGNOSIS — F98.8 ATTENTION DEFICIT DISORDER (ADD) WITHOUT HYPERACTIVITY: ICD-10-CM

## 2018-01-30 RX ORDER — DEXTROAMPHETAMINE SACCHARATE, AMPHETAMINE ASPARTATE MONOHYDRATE, DEXTROAMPHETAMINE SULFATE AND AMPHETAMINE SULFATE 3.75; 3.75; 3.75; 3.75 MG/1; MG/1; MG/1; MG/1
15 CAPSULE, EXTENDED RELEASE ORAL DAILY
Qty: 30 CAPSULE | Refills: 0 | Status: SHIPPED | OUTPATIENT
Start: 2018-01-30 | End: 2018-03-05

## 2018-01-31 DIAGNOSIS — L30.9 ECZEMA OF BOTH HANDS: ICD-10-CM

## 2018-01-31 NOTE — TELEPHONE ENCOUNTER
Requested Prescriptions   Pending Prescriptions Disp Refills     clobetasol (TEMOVATE) 0.05 % ointment [Pharmacy Med Name: CLOBETASOL PROP 0.05% OINT 30GM] 30 g 0     Sig: APPLY TO THE AFFECTED AREA TWICE DAILY    There is no refill protocol information for this order        clobetasol (TEMOVATE) 0.05 % ointment  Last Written Prescription Date:  12/5/17  Last Fill Quantity: 30g,  # refills: 0   Last Office Visit with G provider:  10/10/17   Future Office Visit:

## 2018-02-05 RX ORDER — CLOBETASOL PROPIONATE 0.5 MG/G
OINTMENT TOPICAL
Qty: 30 G | Refills: 0 | Status: SHIPPED | OUTPATIENT
Start: 2018-02-05 | End: 2019-08-19

## 2018-02-05 NOTE — TELEPHONE ENCOUNTER
Requested Prescriptions   Pending Prescriptions Disp Refills     clobetasol (TEMOVATE) 0.05 % ointment [Pharmacy Med Name: CLOBETASOL PROP 0.05% OINT 30GM] 30 g 0     Sig: APPLY TO THE AFFECTED AREA TWICE DAILY    There is no refill protocol information for this order        Routing refill request to provider for review/approval because:  Drug not on the [facility] refill protocol     Colby Guido RN, BSN

## 2018-03-05 ENCOUNTER — MYC MEDICAL ADVICE (OUTPATIENT)
Dept: FAMILY MEDICINE | Facility: CLINIC | Age: 53
End: 2018-03-05

## 2018-03-05 DIAGNOSIS — F43.22 ADJUSTMENT DISORDER WITH ANXIETY: ICD-10-CM

## 2018-03-05 DIAGNOSIS — F98.8 ATTENTION DEFICIT DISORDER (ADD) WITHOUT HYPERACTIVITY: ICD-10-CM

## 2018-03-05 RX ORDER — DEXTROAMPHETAMINE SACCHARATE, AMPHETAMINE ASPARTATE MONOHYDRATE, DEXTROAMPHETAMINE SULFATE AND AMPHETAMINE SULFATE 3.75; 3.75; 3.75; 3.75 MG/1; MG/1; MG/1; MG/1
15 CAPSULE, EXTENDED RELEASE ORAL DAILY
Qty: 30 CAPSULE | Refills: 0 | Status: SHIPPED | OUTPATIENT
Start: 2018-03-05 | End: 2018-03-13

## 2018-03-05 NOTE — TELEPHONE ENCOUNTER
"Requested Prescriptions   Pending Prescriptions Disp Refills     FLUoxetine (PROZAC) 20 MG capsule 90 capsule 3     Sig: Take 1 capsule (20 mg) by mouth daily    SSRIs Protocol Passed    3/5/2018  8:21 AM       Passed - Recent (12 mo) or future (30 days) visit within the authorizing provider's specialty    Patient had office visit in the last year or has a visit in the next 30 days with authorizing provider.  See \"Patient Info\" tab in inbasket, or \"Choose Columns\" in Meds & Orders section of the refill encounter.            Passed - Patient is age 18 or older       Passed - No active pregnancy on record       Passed - No positive pregnancy test in last 12 months      FLUoxetine (PROZAC) 20 MG capsule  Last Written Prescription Date:  3/2/17  Last Fill Quantity: 90,  # refills: 3   Last office visit: 10/10/2017 with prescribing provider:  Dr. Armstrong   Future Office Visit:      PHQ-9 SCORE 11/16/2015 3/9/2017 10/10/2017   Total Score - - -   Total Score 1 2 1     GALE-7 SCORE 11/16/2015 3/9/2017 10/10/2017   Total Score - - -   Total Score 8 3 9             "

## 2018-03-07 NOTE — TELEPHONE ENCOUNTER
Prescription approved per [facility] Refill Protocol.    Augusta Bynum RN  [facility]

## 2018-03-12 ENCOUNTER — MYC MEDICAL ADVICE (OUTPATIENT)
Dept: FAMILY MEDICINE | Facility: CLINIC | Age: 53
End: 2018-03-12

## 2018-03-12 DIAGNOSIS — F98.8 ATTENTION DEFICIT DISORDER (ADD) WITHOUT HYPERACTIVITY: ICD-10-CM

## 2018-03-13 RX ORDER — DEXTROAMPHETAMINE SACCHARATE, AMPHETAMINE ASPARTATE MONOHYDRATE, DEXTROAMPHETAMINE SULFATE AND AMPHETAMINE SULFATE 3.75; 3.75; 3.75; 3.75 MG/1; MG/1; MG/1; MG/1
15 CAPSULE, EXTENDED RELEASE ORAL DAILY
Qty: 30 CAPSULE | Refills: 0 | Status: SHIPPED | OUTPATIENT
Start: 2018-03-13 | End: 2018-04-05

## 2018-03-13 NOTE — TELEPHONE ENCOUNTER
Spoke with pharmacy they have not received Rx.  Asked pharmacy to spread if they do receive it.    New Rx placed at , LM informing pt.    Gina STEVENSON, Patient Care

## 2018-03-13 NOTE — TELEPHONE ENCOUNTER
Call pharmacy and verify they have not received this script for Adderall.  If they have it - let patient know so she can fill.    If not received yet, tell them if they receive it to cancel/destroy it.  Once this is done, put script at  for pickup and send patient a [id number] message.    ANGK

## 2018-04-05 ENCOUNTER — MYC MEDICAL ADVICE (OUTPATIENT)
Dept: FAMILY MEDICINE | Facility: CLINIC | Age: 53
End: 2018-04-05

## 2018-04-05 DIAGNOSIS — F98.8 ATTENTION DEFICIT DISORDER (ADD) WITHOUT HYPERACTIVITY: ICD-10-CM

## 2018-04-05 RX ORDER — DEXTROAMPHETAMINE SACCHARATE, AMPHETAMINE ASPARTATE MONOHYDRATE, DEXTROAMPHETAMINE SULFATE AND AMPHETAMINE SULFATE 3.75; 3.75; 3.75; 3.75 MG/1; MG/1; MG/1; MG/1
15 CAPSULE, EXTENDED RELEASE ORAL DAILY
Qty: 30 CAPSULE | Refills: 0 | Status: SHIPPED | OUTPATIENT
Start: 2018-04-05 | End: 2018-05-07

## 2018-05-07 ENCOUNTER — MYC MEDICAL ADVICE (OUTPATIENT)
Dept: FAMILY MEDICINE | Facility: CLINIC | Age: 53
End: 2018-05-07

## 2018-05-07 DIAGNOSIS — F98.8 ATTENTION DEFICIT DISORDER (ADD) WITHOUT HYPERACTIVITY: ICD-10-CM

## 2018-05-07 RX ORDER — DEXTROAMPHETAMINE SACCHARATE, AMPHETAMINE ASPARTATE MONOHYDRATE, DEXTROAMPHETAMINE SULFATE AND AMPHETAMINE SULFATE 3.75; 3.75; 3.75; 3.75 MG/1; MG/1; MG/1; MG/1
15 CAPSULE, EXTENDED RELEASE ORAL DAILY
Qty: 30 CAPSULE | Refills: 0 | Status: SHIPPED | OUTPATIENT
Start: 2018-05-07 | End: 2018-06-21

## 2018-05-14 NOTE — TELEPHONE ENCOUNTER
Please call patient re:  Did not read [employer] message - follow up due for next script.  Ok to leave message that she has a [employer] message to read.    ANGK

## 2018-06-07 ENCOUNTER — MYC MEDICAL ADVICE (OUTPATIENT)
Dept: FAMILY MEDICINE | Facility: CLINIC | Age: 53
End: 2018-06-07

## 2018-06-21 ENCOUNTER — OFFICE VISIT (OUTPATIENT)
Dept: FAMILY MEDICINE | Facility: CLINIC | Age: 53
End: 2018-06-21
Payer: COMMERCIAL

## 2018-06-21 VITALS
TEMPERATURE: 98.6 F | BODY MASS INDEX: 41.59 KG/M2 | OXYGEN SATURATION: 96 % | SYSTOLIC BLOOD PRESSURE: 130 MMHG | WEIGHT: 265 LBS | HEIGHT: 67 IN | HEART RATE: 75 BPM | DIASTOLIC BLOOD PRESSURE: 80 MMHG

## 2018-06-21 DIAGNOSIS — M76.61 ACHILLES TENDINITIS OF RIGHT LOWER EXTREMITY: ICD-10-CM

## 2018-06-21 DIAGNOSIS — E66.01 MORBID OBESITY DUE TO EXCESS CALORIES (H): ICD-10-CM

## 2018-06-21 DIAGNOSIS — E03.4 HYPOTHYROIDISM DUE TO ACQUIRED ATROPHY OF THYROID: ICD-10-CM

## 2018-06-21 DIAGNOSIS — F98.8 ATTENTION DEFICIT DISORDER (ADD) WITHOUT HYPERACTIVITY: ICD-10-CM

## 2018-06-21 DIAGNOSIS — F43.22 ADJUSTMENT DISORDER WITH ANXIETY: Primary | ICD-10-CM

## 2018-06-21 PROCEDURE — 99214 OFFICE O/P EST MOD 30 MIN: CPT | Performed by: FAMILY MEDICINE

## 2018-06-21 RX ORDER — DEXTROAMPHETAMINE SACCHARATE, AMPHETAMINE ASPARTATE MONOHYDRATE, DEXTROAMPHETAMINE SULFATE AND AMPHETAMINE SULFATE 3.75; 3.75; 3.75; 3.75 MG/1; MG/1; MG/1; MG/1
15 CAPSULE, EXTENDED RELEASE ORAL DAILY
Qty: 30 CAPSULE | Refills: 0 | Status: SHIPPED | OUTPATIENT
Start: 2018-06-21 | End: 2018-07-19

## 2018-06-21 ASSESSMENT — ANXIETY QUESTIONNAIRES
IF YOU CHECKED OFF ANY PROBLEMS ON THIS QUESTIONNAIRE, HOW DIFFICULT HAVE THESE PROBLEMS MADE IT FOR YOU TO DO YOUR WORK, TAKE CARE OF THINGS AT HOME, OR GET ALONG WITH OTHER PEOPLE: NOT DIFFICULT AT ALL
5. BEING SO RESTLESS THAT IT IS HARD TO SIT STILL: MORE THAN HALF THE DAYS
2. NOT BEING ABLE TO STOP OR CONTROL WORRYING: NOT AT ALL
1. FEELING NERVOUS, ANXIOUS, OR ON EDGE: NOT AT ALL
3. WORRYING TOO MUCH ABOUT DIFFERENT THINGS: MORE THAN HALF THE DAYS
7. FEELING AFRAID AS IF SOMETHING AWFUL MIGHT HAPPEN: NOT AT ALL
6. BECOMING EASILY ANNOYED OR IRRITABLE: SEVERAL DAYS
GAD7 TOTAL SCORE: 7

## 2018-06-21 ASSESSMENT — PAIN SCALES - GENERAL: PAINLEVEL: NO PAIN (0)

## 2018-06-21 ASSESSMENT — PATIENT HEALTH QUESTIONNAIRE - PHQ9: 5. POOR APPETITE OR OVEREATING: MORE THAN HALF THE DAYS
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
## 2018-06-21 NOTE — PATIENT INSTRUCTIONS
Notify me if an additional short acting adderall is desired for your evening class.     For the right foot pain ibuprofen 600mg three times daily with food for 5 to 7 days maybe used. Referral for physical therapy made.      Refills for Fluoxetine and Adderall today. Continue to take as directed.     Referral for  bariatric weight clinic made. Follow up as planned.

## 2018-06-21 NOTE — MR AVS SNAPSHOT
After Visit Summary   6/21/2018    Brittany Rooney    MRN: [id number]           Patient Information     Date Of Birth          1965        Visit Information        Provider Department      6/21/2018 8:20 AM Esther Armstrong MD [facility]        Today's Diagnoses     Adjustment disorder with anxiety    -  1    Attention deficit disorder (ADD) without hyperactivity        Hypothyroidism due to acquired atrophy of thyroid        Morbid obesity due to excess calories (H)        Achilles tendinitis of right lower extremity          Care Instructions    Notify me if an additional short acting adderall is desired for your evening class.     For the right foot pain ibuprofen 600mg three times daily with food for 5 to 7 days maybe used. Referral for physical therapy made.      Refills for Fluoxetine and Adderall today. Continue to take as directed.     Referral for  bariatric weight clinic made. Follow up as planned.           Follow-ups after your visit        Additional Services     BARIATRIC ADULT REFERRAL       Your provider has referred you to: [facility]: [facility] [phone]  [email or web address]    Please be aware that coverage of these services is subject to the terms and limitations of your health insurance plan.  Call member services at your health plan with any benefit or coverage questions.      Please bring the following with you to your appointment:      (1) List of current medications   (2) This referral request   (3) Any documents/labs given to you for this referral            [facility], AND CHIR[facility]RACTIC REFERRAL       **This order will print in the [facility] Scheduling Office**    Physical Therapy, Hand Therapy and Chiropractic Care are available through:    *[facility]  *[facility]  *[facility]    Call one number to schedule at any of the above locations: [phone].    Your provider has referred you to: Physical Therapy at [facility] or [facility]    Indication/Reason for Referral: achilles tendonitis  Onset of Illness: approximately 1 year   Therapy Orders: Evaluate and Treat  Special Programs: None  Special Request: None    Vitaly Haider      Additional Comments for the Therapist or Chiropractor: HEP    Please be aware that coverage of these services is subject to the terms and limitations of your health insurance plan.  Call member services at your health plan with any benefit or coverage questions.      Please bring the following to your appointment:    *Your personal calendar for scheduling future appointments  *Comfortable clothing                  Who to contact     If you have questions or need follow up information about today's clinic visit or your schedule please contact [facility] directly at [phone].  Normal or non-critical lab and imaging results will be communicated to you by [email or web address], letter or phone within 4 business days after the clinic has received the results. If you do not hear from us within 7 days, please contact the clinic through [email or web address] or phone. If you have a critical or abnormal lab result, we will notify you by phone as soon as possible.  Submit refill requests through [employer] or call your pharmacy and they will forward the refill request to us. Please allow 3 business days for your refill to be completed.          Additional Information About Your Visit        [employer] Information     [employer] gives you secure access to your electronic health record. If you see a primary care provider, you can also send messages to your care team and make appointments. If you have questions, please call your primary care clinic.  If you do not have a primary care provider, please call [phone] and they will assist you.        Care EveryWhere ID     This is your Care  "EveryWhere ID. This could be used by other organizations to access your [place] medical records  [id number]        Your Vitals Were     Pulse Temperature Height Last Period Pulse Oximetry Breastfeeding?    75 98.6  F (37  C) (Oral) 1.689 m (5' 6.5\") 06/18/2018 (Exact Date) 96% No    BMI (Body Mass Index)                   42.13 kg/m2            Blood Pressure from Last 3 Encounters:   06/21/18 138/86   10/10/17 128/82   03/09/17 132/86    Weight from Last 3 Encounters:   06/21/18 120.2 kg (265 lb)   10/10/17 117.5 kg (259 lb)   03/09/17 117.5 kg (259 lb)              We Performed the Following     BARIATRIC ADULT REFERRAL     [facility], HAND, AND CHIR[facility]RACTIC REFERRAL          Where to get your medicines      These medications were sent to [email or web address] [id number] [place] [address] [address] [facility] [address] [address]     Phone:  [phone]     FLUoxetine 20 MG capsule         Some of these will need a paper prescription and others can be bought over the counter.  Ask your nurse if you have questions.     Bring a paper prescription for each of these medications     amphetamine-dextroamphetamine 15 MG per 24 hr capsule          Primary Care Provider Office Phone # Fax #    Esther Armstrong [phone] [address]  [facility] [address]        Equal Access to Services     RENEE BARRY : Hadii aad ku hadasho Soomaali, waaxda luqadaha, qaybta kaalmada letyegyada, misael mason. So [facility] [phone].    ATENCIÓN: Si habla español, tiene a dumont disposición servicios gratuitos de asistencia lingüística. Llame al [phone].    We comply with applicable federal civil rights laws and [place] laws. We do not discriminate on the basis of race, color, national origin, age, disability, sex, sexual orientation, or gender identity.            Thank you!     Thank you for choosing [facility]  for your care. Our goal is always to provide you with excellent care. Hearing back from our patients is one way we can continue to improve our services. Please take a few minutes to complete the written survey that you may receive in the mail after your visit with us. Thank you!             Your Updated Medication List - Protect others around you: Learn how to safely use, store and throw away your medicines at www.disposemymeds.org.          This list is accurate as of 6/21/18  9:07 AM.  Always use your most recent med list.                   Brand Name Dispense Instructions for use Diagnosis    amphetamine-dextroamphetamine 15 MG per 24 hr capsule    ADDERALL XR    30 capsule    Take 1 capsule (15 mg) by mouth daily    Attention deficit disorder (ADD) without hyperactivity       clobetasol 0.05 % ointment    TEMOVATE    30 g    APPLY TO THE AFFECTED AREA TWICE DAILY    Eczema of both hands       FLUoxetine 20 MG capsule    PROzac    90 capsule    Take 1 capsule (20 mg) by mouth daily    Adjustment disorder with anxiety       ibuprofen 200 MG capsule      2 CAPSULE EVERY 4 TO 6 HOURS AS NEEDED    Other malaise and fatigue       levothyroxine 125 MCG tablet    SYNTHROID/LEVOTHROID    90 tablet    Take 1 tablet (125 mcg) by mouth daily    Hypothyroidism due to acquired atrophy of thyroid       mometasone 0.1 % cream    ELOCON    45 g    Apply  topically as needed.    Eczema       vitamin D 2000 units tablet     100 tablet    Take 1 tablet by mouth 2 times daily.    Vitamin D deficiencies

## 2018-06-22 ASSESSMENT — PATIENT HEALTH QUESTIONNAIRE - PHQ9: SUM OF ALL RESPONSES TO PHQ QUESTIONS 1-9: 5

## 2018-06-22 ASSESSMENT — ANXIETY QUESTIONNAIRES: GAD7 TOTAL SCORE: 7

## 2018-07-18 ENCOUNTER — MYC MEDICAL ADVICE (OUTPATIENT)
Dept: FAMILY MEDICINE | Facility: CLINIC | Age: 53
End: 2018-07-18

## 2018-07-18 DIAGNOSIS — F98.8 ATTENTION DEFICIT DISORDER (ADD) WITHOUT HYPERACTIVITY: ICD-10-CM

## 2018-07-19 RX ORDER — DEXTROAMPHETAMINE SACCHARATE, AMPHETAMINE ASPARTATE MONOHYDRATE, DEXTROAMPHETAMINE SULFATE AND AMPHETAMINE SULFATE 3.75; 3.75; 3.75; 3.75 MG/1; MG/1; MG/1; MG/1
15 CAPSULE, EXTENDED RELEASE ORAL DAILY
Qty: 30 CAPSULE | Refills: 0 | Status: SHIPPED | OUTPATIENT
Start: 2018-07-19 | End: 2018-08-16

## 2018-08-16 ENCOUNTER — MYC MEDICAL ADVICE (OUTPATIENT)
Dept: FAMILY MEDICINE | Facility: CLINIC | Age: 53
End: 2018-08-16

## 2018-08-16 DIAGNOSIS — F98.8 ATTENTION DEFICIT DISORDER (ADD) WITHOUT HYPERACTIVITY: ICD-10-CM

## 2018-08-16 RX ORDER — DEXTROAMPHETAMINE SACCHARATE, AMPHETAMINE ASPARTATE MONOHYDRATE, DEXTROAMPHETAMINE SULFATE AND AMPHETAMINE SULFATE 3.75; 3.75; 3.75; 3.75 MG/1; MG/1; MG/1; MG/1
15 CAPSULE, EXTENDED RELEASE ORAL DAILY
Qty: 30 CAPSULE | Refills: 0 | Status: SHIPPED | OUTPATIENT
Start: 2018-08-17 | End: 2018-09-13

## 2018-08-16 NOTE — TELEPHONE ENCOUNTER
Left a message letting patient know her RX is ready for  or mailed to pharmacy.    Sandra Dumont

## 2018-08-16 NOTE — TELEPHONE ENCOUNTER
Pending Prescriptions:                       Disp   Refills    amphetamine-dextroamphetamine (ADDERALL X*30 cap*0            Sig: Take 1 capsule (15 mg) by mouth daily

## 2018-08-16 NOTE — TELEPHONE ENCOUNTER
Waiting for patient to call back toy inform us of what to do with the RX.  Placed RX in the MA bin.    Sandra Dumont

## 2018-08-16 NOTE — TELEPHONE ENCOUNTER
Prescription signed.  Please notify patient .  I'm not sure if she plans to  or have mailed to her pharmacy

## 2018-08-17 NOTE — TELEPHONE ENCOUNTER
Patient returned call    Best number to reach caller: Home number on file [phone] (home)    Is it ok to leave a detailed message: Not Applicable    Calling with information requested;   [facility]

## 2018-09-05 DIAGNOSIS — F43.22 ADJUSTMENT DISORDER WITH ANXIETY: ICD-10-CM

## 2018-09-05 NOTE — TELEPHONE ENCOUNTER
"Requested Prescriptions   Pending Prescriptions Disp Refills     FLUoxetine (PROZAC) 20 MG capsule [Pharmacy Med Name: FLUOXETINE 20MG CAPSULES] 90 capsule 0     Sig: TAKE 1 CAPSULE(20 MG) BY MOUTH DAILY    SSRIs Protocol Passed    9/5/2018  3:34 AM       Passed - Recent (12 mo) or future (30 days) visit within the authorizing provider's specialty    Patient had office visit in the last 12 months or has a visit in the next 30 days with authorizing provider or within the authorizing provider's specialty.  See \"Patient Info\" tab in inbasket, or \"Choose Columns\" in Meds & Orders section of the refill encounter.           Passed - Patient is age 18 or older       Passed - No active pregnancy on record       Passed - No positive pregnancy test in last 12 months        FLUoxetine (PROZAC) 20 MG capsule  Last Written Prescription Date:  6/21/18  Last Fill Quantity: 90,  # refills: 1   Last office visit: 6/21/2018 with prescribing provider:  Dr. Armstrong   Future Office Visit:      PHQ-9 SCORE 3/9/2017 10/10/2017 6/21/2018   Total Score - - -   Total Score 2 1 5     GALE-7 SCORE 3/9/2017 10/10/2017 6/21/2018   Total Score - - -   Total Score 3 9 7         "

## 2018-09-13 ENCOUNTER — MYC MEDICAL ADVICE (OUTPATIENT)
Dept: FAMILY MEDICINE | Facility: CLINIC | Age: 53
End: 2018-09-13

## 2018-09-13 DIAGNOSIS — F98.8 ATTENTION DEFICIT DISORDER (ADD) WITHOUT HYPERACTIVITY: ICD-10-CM

## 2018-09-13 RX ORDER — DEXTROAMPHETAMINE SACCHARATE, AMPHETAMINE ASPARTATE MONOHYDRATE, DEXTROAMPHETAMINE SULFATE AND AMPHETAMINE SULFATE 3.75; 3.75; 3.75; 3.75 MG/1; MG/1; MG/1; MG/1
15 CAPSULE, EXTENDED RELEASE ORAL DAILY
Qty: 30 CAPSULE | Refills: 0 | Status: SHIPPED | OUTPATIENT
Start: 2018-09-13 | End: 2018-10-15

## 2018-10-15 ENCOUNTER — MYC MEDICAL ADVICE (OUTPATIENT)
Dept: FAMILY MEDICINE | Facility: CLINIC | Age: 53
End: 2018-10-15

## 2018-10-15 DIAGNOSIS — F98.8 ATTENTION DEFICIT DISORDER (ADD) WITHOUT HYPERACTIVITY: ICD-10-CM

## 2018-10-15 RX ORDER — DEXTROAMPHETAMINE SACCHARATE, AMPHETAMINE ASPARTATE MONOHYDRATE, DEXTROAMPHETAMINE SULFATE AND AMPHETAMINE SULFATE 3.75; 3.75; 3.75; 3.75 MG/1; MG/1; MG/1; MG/1
15 CAPSULE, EXTENDED RELEASE ORAL DAILY
Qty: 30 CAPSULE | Refills: 0 | Status: SHIPPED | OUTPATIENT
Start: 2018-10-15 | End: 2018-11-15

## 2018-10-26 DIAGNOSIS — E03.4 HYPOTHYROIDISM DUE TO ACQUIRED ATROPHY OF THYROID: ICD-10-CM

## 2018-10-26 RX ORDER — LEVOTHYROXINE SODIUM 125 UG/1
125 TABLET ORAL DAILY
Qty: 30 TABLET | Refills: 0 | Status: SHIPPED | OUTPATIENT
Start: 2018-10-26 | End: 2018-11-24

## 2018-10-26 NOTE — TELEPHONE ENCOUNTER
"Requested Prescriptions   Pending Prescriptions Disp Refills     levothyroxine (SYNTHROID/LEVOTHROID) 125 MCG tablet [Pharmacy Med Name: LEVOTHYROXINE 0.125MG (125MCG) TAB]  Last Written Prescription Date:  10/10/17  Last Fill Quantity: 90 tablet,  # refills: 3   Last office visit: 6/21/2018 with prescribing provider:  Dr. Armstrong   Future Office Visit:     90 tablet 0     Sig: TAKE 1 TABLET(125 MCG) BY MOUTH DAILY    Thyroid Protocol Failed    10/26/2018  3:34 AM       Failed - Normal TSH on file in past 12 months    Recent Labs   Lab Test  10/10/17   0745   TSH  1.51             Passed - Patient is 12 years or older       Passed - Recent (12 mo) or future (30 days) visit within the authorizing provider's specialty    Patient had office visit in the last 12 months or has a visit in the next 30 days with authorizing provider or within the authorizing provider's specialty.  See \"Patient Info\" tab in inbasket, or \"Choose Columns\" in Meds & Orders section of the refill encounter.             Passed - No active pregnancy on record    If patient is pregnant or has had a positive pregnancy test, please check TSH.         Passed - No positive pregnancy test in past 12 months    If patient is pregnant or has had a positive pregnancy test, please check TSH.            "

## 2018-10-26 NOTE — TELEPHONE ENCOUNTER
Routing refill request to provider for review/approval because:  Labs not current:  TSH    Rose Charles RN

## 2018-11-12 ENCOUNTER — SURGERY (OUTPATIENT)
Age: 53
End: 2018-11-12
Payer: COMMERCIAL

## 2018-11-12 ENCOUNTER — HOSPITAL ENCOUNTER (OUTPATIENT)
Facility: AMBULATORY SURGERY CENTER | Age: 53
Discharge: HOME | End: 2018-11-12
Attending: INTERNAL MEDICINE | Admitting: INTERNAL MEDICINE
Payer: COMMERCIAL

## 2018-11-12 VITALS
DIASTOLIC BLOOD PRESSURE: 80 MMHG | OXYGEN SATURATION: 95 % | TEMPERATURE: 97.9 F | SYSTOLIC BLOOD PRESSURE: 104 MMHG | RESPIRATION RATE: 16 BRPM

## 2018-11-12 LAB — COLONOSCOPY: NORMAL

## 2018-11-12 PROCEDURE — G8918 PT W/O PREOP ORDER IV AB PRO: HCPCS

## 2018-11-12 PROCEDURE — G8907 PT DOC NO EVENTS ON DISCHARG: HCPCS

## 2018-11-12 PROCEDURE — 88305 TISSUE EXAM BY PATHOLOGIST: CPT | Performed by: INTERNAL MEDICINE

## 2018-11-12 PROCEDURE — 45380 COLONOSCOPY AND BIOPSY: CPT

## 2018-11-12 PROCEDURE — 45380 COLONOSCOPY AND BIOPSY: CPT | Mod: 33 | Performed by: INTERNAL MEDICINE

## 2018-11-12 RX ORDER — LIDOCAINE 40 MG/G
CREAM TOPICAL
Status: DISCONTINUED | OUTPATIENT
Start: 2018-11-12 | End: 2018-11-13 | Stop reason: HOSPADM

## 2018-11-12 RX ORDER — FENTANYL CITRATE 50 UG/ML
INJECTION, SOLUTION INTRAMUSCULAR; INTRAVENOUS PRN
Status: DISCONTINUED | OUTPATIENT
Start: 2018-11-12 | End: 2018-11-12 | Stop reason: HOSPADM

## 2018-11-12 RX ORDER — ONDANSETRON 2 MG/ML
4 INJECTION INTRAMUSCULAR; INTRAVENOUS
Status: DISCONTINUED | OUTPATIENT
Start: 2018-11-12 | End: 2018-11-13 | Stop reason: HOSPADM

## 2018-11-12 RX ADMIN — FENTANYL CITRATE 25 MCG: 50 INJECTION, SOLUTION INTRAMUSCULAR; INTRAVENOUS at 12:17

## 2018-11-12 RX ADMIN — FENTANYL CITRATE 50 MCG: 50 INJECTION, SOLUTION INTRAMUSCULAR; INTRAVENOUS at 12:12

## 2018-11-12 RX ADMIN — FENTANYL CITRATE 25 MCG: 50 INJECTION, SOLUTION INTRAMUSCULAR; INTRAVENOUS at 12:15

## 2018-11-15 ENCOUNTER — MYC MEDICAL ADVICE (OUTPATIENT)
Dept: FAMILY MEDICINE | Facility: CLINIC | Age: 53
End: 2018-11-15

## 2018-11-15 DIAGNOSIS — F98.8 ATTENTION DEFICIT DISORDER (ADD) WITHOUT HYPERACTIVITY: ICD-10-CM

## 2018-11-15 LAB — COPATH REPORT: NORMAL

## 2018-11-15 RX ORDER — DEXTROAMPHETAMINE SACCHARATE, AMPHETAMINE ASPARTATE MONOHYDRATE, DEXTROAMPHETAMINE SULFATE AND AMPHETAMINE SULFATE 3.75; 3.75; 3.75; 3.75 MG/1; MG/1; MG/1; MG/1
15 CAPSULE, EXTENDED RELEASE ORAL DAILY
Qty: 30 CAPSULE | Refills: 0 | Status: SHIPPED | OUTPATIENT
Start: 2018-11-15 | End: 2018-12-17

## 2018-11-24 DIAGNOSIS — E03.4 HYPOTHYROIDISM DUE TO ACQUIRED ATROPHY OF THYROID: ICD-10-CM

## 2018-11-27 NOTE — TELEPHONE ENCOUNTER
Routing refill request to provider for review/approval because:  Gabbie given x1 and patient did not follow up, please advise  Edyta Melvin RN

## 2018-11-28 RX ORDER — LEVOTHYROXINE SODIUM 125 UG/1
125 TABLET ORAL DAILY
Qty: 30 TABLET | Refills: 0 | Status: SHIPPED | OUTPATIENT
Start: 2018-11-28 | End: 2019-01-01

## 2018-11-28 NOTE — TELEPHONE ENCOUNTER
Patient needs to be seen by Esther Armstrong MD  (provider or resource)  Is there a time frame in which the patient should be seen Yes: Dec  What does the patient need to be seen regarding annual, labs thyroid   Does patient need to come fasting Yes  Phone: [phone] (home)  When workflow completed Close Encounter    Clinic: [facility] at [phone]    'Hi, this is (your name) I am calling from (provider's name) office. After reviewing your chart, (Provider's name) has indicated that you need an appointment to review (reason for visit). May I assist you in making this appointment? (Please contact us via [employer] or by phone at (Clinic name and Phone number) to schedule this appointment at your earliest convenience)'    Was first outreach attempted?No  If yes, the Second attempt due on:

## 2018-12-17 ENCOUNTER — MYC MEDICAL ADVICE (OUTPATIENT)
Dept: FAMILY MEDICINE | Facility: CLINIC | Age: 53
End: 2018-12-17

## 2018-12-17 DIAGNOSIS — F98.8 ATTENTION DEFICIT DISORDER (ADD) WITHOUT HYPERACTIVITY: ICD-10-CM

## 2018-12-17 RX ORDER — DEXTROAMPHETAMINE SACCHARATE, AMPHETAMINE ASPARTATE MONOHYDRATE, DEXTROAMPHETAMINE SULFATE AND AMPHETAMINE SULFATE 3.75; 3.75; 3.75; 3.75 MG/1; MG/1; MG/1; MG/1
15 CAPSULE, EXTENDED RELEASE ORAL DAILY
Qty: 30 CAPSULE | Refills: 0 | Status: SHIPPED | OUTPATIENT
Start: 2018-12-17 | End: 2019-01-17

## 2018-12-17 NOTE — TELEPHONE ENCOUNTER
Rx mailed to [facility] in [place].    Gina STEVENSON, Patient Care

## 2019-01-01 ENCOUNTER — MYC REFILL (OUTPATIENT)
Dept: FAMILY MEDICINE | Facility: CLINIC | Age: 54
End: 2019-01-01

## 2019-01-01 DIAGNOSIS — E03.4 HYPOTHYROIDISM DUE TO ACQUIRED ATROPHY OF THYROID: ICD-10-CM

## 2019-01-02 NOTE — TELEPHONE ENCOUNTER
"Requested Prescriptions   Pending Prescriptions Disp Refills     levothyroxine (SYNTHROID/LEVOTHROID) 125 MCG tablet 30 tablet 0     Sig: Take 1 tablet (125 mcg) by mouth daily +++ appointment needed for additional refills +++    Thyroid Protocol Failed - 1/2/2019  8:11 AM       Failed - Normal TSH on file in past 12 months    Recent Labs   Lab Test 10/10/17  0745   TSH 1.51             Passed - Patient is 12 years or older       Passed - Recent (12 mo) or future (30 days) visit within the authorizing provider's specialty    Patient had office visit in the last 12 months or has a visit in the next 30 days with authorizing provider or within the authorizing provider's specialty.  See \"Patient Info\" tab in inbasket, or \"Choose Columns\" in Meds & Orders section of the refill encounter.             Passed - No active pregnancy on record    If patient is pregnant or has had a positive pregnancy test, please check TSH.         Passed - No positive pregnancy test in past 12 months    If patient is pregnant or has had a positive pregnancy test, please check TSH.          levothyroxine (SYNTHROID/LEVOTHROID) 125 MCG tablet  Last Written Prescription Date:  11/28/18  Last Fill Quantity: 30,  # refills: 0   Last office visit: 6/21/2018 with prescribing provider:  Dr. Armstrong   Future Office Visit:   Next 5 appointments (look out 90 days)    Vlad 10, 2019  7:20 AM CST  PHYSICAL with Esther Armstrong MD  [facility] ([facility]) [address] [place] [id number] [phone]           "

## 2019-01-04 DIAGNOSIS — E03.4 HYPOTHYROIDISM DUE TO ACQUIRED ATROPHY OF THYROID: ICD-10-CM

## 2019-01-04 RX ORDER — LEVOTHYROXINE SODIUM 125 UG/1
125 TABLET ORAL DAILY
Qty: 30 TABLET | Refills: 0 | Status: SHIPPED | OUTPATIENT
Start: 2019-01-04 | End: 2019-01-23

## 2019-01-04 NOTE — TELEPHONE ENCOUNTER
Gabbie already given x 1.     Brittany LOWE Nevin would like a refill of the following medications:         levothyroxine (SYNTHROID/LEVOTHROID) 125 MCG tablet [Esther Armstrong MD]       Patient Comment: I have an appointment January 10th but will run out of prescription before then. Is it possible to have a small amount sent to my pharmacy?     Preferred pharmacy: [facility] [address] [place] [address] [address] AT [facility] [address]     Routing to provider high priority.   Josi Salinas RN

## 2019-01-04 NOTE — TELEPHONE ENCOUNTER
"Requested Prescriptions   Pending Prescriptions Disp Refills     levothyroxine (SYNTHROID/LEVOTHROID) 125 MCG tablet [Pharmacy Med Name: LEVOTHYROXINE 0.125MG (125MCG) TAB]  Last Written Prescription Date:  11/28/18  Last Fill Quantity: 30 tablet,  # refills: 0   Last office visit: 6/21/2018 with prescribing provider:  Dr. Armstrong   Future Office Visit:   Next 5 appointments (look out 90 days)    Vlad 10, 2019  7:20 AM CST  PHYSICAL with Esther Armstrong MD  [facility] ([facility]) [address] [place] [id number] [phone]          30 tablet 0     Sig: TAKE 1 TABLET BY MOUTH DAILY    Thyroid Protocol Failed - 1/4/2019  9:45 AM       Failed - Normal TSH on file in past 12 months    Recent Labs   Lab Test 10/10/17  0745   TSH 1.51             Passed - Patient is 12 years or older       Passed - Recent (12 mo) or future (30 days) visit within the authorizing provider's specialty    Patient had office visit in the last 12 months or has a visit in the next 30 days with authorizing provider or within the authorizing provider's specialty.  See \"Patient Info\" tab in inbasket, or \"Choose Columns\" in Meds & Orders section of the refill encounter.             Passed - No active pregnancy on record    If patient is pregnant or has had a positive pregnancy test, please check TSH.         Passed - No positive pregnancy test in past 12 months    If patient is pregnant or has had a positive pregnancy test, please check TSH.            "

## 2019-01-08 RX ORDER — LEVOTHYROXINE SODIUM 125 UG/1
TABLET ORAL
Qty: 30 TABLET | Refills: 0 | OUTPATIENT
Start: 2019-01-08

## 2019-01-17 ENCOUNTER — MYC MEDICAL ADVICE (OUTPATIENT)
Dept: FAMILY MEDICINE | Facility: CLINIC | Age: 54
End: 2019-01-17

## 2019-01-17 DIAGNOSIS — F98.8 ATTENTION DEFICIT DISORDER (ADD) WITHOUT HYPERACTIVITY: ICD-10-CM

## 2019-01-17 RX ORDER — DEXTROAMPHETAMINE SACCHARATE, AMPHETAMINE ASPARTATE MONOHYDRATE, DEXTROAMPHETAMINE SULFATE AND AMPHETAMINE SULFATE 3.75; 3.75; 3.75; 3.75 MG/1; MG/1; MG/1; MG/1
15 CAPSULE, EXTENDED RELEASE ORAL DAILY
Qty: 30 CAPSULE | Refills: 0 | Status: SHIPPED | OUTPATIENT
Start: 2019-01-17 | End: 2019-02-18

## 2019-01-22 ENCOUNTER — OFFICE VISIT (OUTPATIENT)
Dept: FAMILY MEDICINE | Facility: CLINIC | Age: 54
End: 2019-01-22
Payer: COMMERCIAL

## 2019-01-22 VITALS
OXYGEN SATURATION: 97 % | SYSTOLIC BLOOD PRESSURE: 138 MMHG | DIASTOLIC BLOOD PRESSURE: 76 MMHG | TEMPERATURE: 97.5 F | HEIGHT: 66 IN | HEART RATE: 73 BPM | BODY MASS INDEX: 42.3 KG/M2 | WEIGHT: 263.2 LBS

## 2019-01-22 DIAGNOSIS — L30.9 ECZEMA, UNSPECIFIED TYPE: ICD-10-CM

## 2019-01-22 DIAGNOSIS — E66.01 MORBID OBESITY DUE TO EXCESS CALORIES (H): ICD-10-CM

## 2019-01-22 DIAGNOSIS — Z12.39 BREAST CANCER SCREENING: ICD-10-CM

## 2019-01-22 DIAGNOSIS — Z12.4 SCREENING FOR MALIGNANT NEOPLASM OF CERVIX: ICD-10-CM

## 2019-01-22 DIAGNOSIS — E78.5 HYPERLIPIDEMIA LDL GOAL <130: ICD-10-CM

## 2019-01-22 DIAGNOSIS — Z11.4 ENCOUNTER FOR SCREENING FOR HIV: ICD-10-CM

## 2019-01-22 DIAGNOSIS — Z00.00 ROUTINE GENERAL MEDICAL EXAMINATION AT A HEALTH CARE FACILITY: Primary | ICD-10-CM

## 2019-01-22 DIAGNOSIS — F98.8 ATTENTION DEFICIT DISORDER (ADD) WITHOUT HYPERACTIVITY: ICD-10-CM

## 2019-01-22 DIAGNOSIS — E03.4 HYPOTHYROIDISM DUE TO ACQUIRED ATROPHY OF THYROID: ICD-10-CM

## 2019-01-22 DIAGNOSIS — F43.22 ADJUSTMENT DISORDER WITH ANXIETY: ICD-10-CM

## 2019-01-22 LAB
ALBUMIN SERPL-MCNC: 3.5 G/DL (ref 3.4–5)
ALP SERPL-CCNC: 57 U/L (ref 40–150)
ALT SERPL W P-5'-P-CCNC: 20 U/L (ref 0–50)
ANION GAP SERPL CALCULATED.3IONS-SCNC: 8 MMOL/L (ref 3–14)
AST SERPL W P-5'-P-CCNC: 15 U/L (ref 0–45)
BILIRUB SERPL-MCNC: 0.5 MG/DL (ref 0.2–1.3)
BUN SERPL-MCNC: 15 MG/DL (ref 7–30)
CALCIUM SERPL-MCNC: 8.6 MG/DL (ref 8.5–10.1)
CHLORIDE SERPL-SCNC: 107 MMOL/L (ref 94–109)
CHOLEST SERPL-MCNC: 175 MG/DL
CO2 SERPL-SCNC: 23 MMOL/L (ref 20–32)
CREAT SERPL-MCNC: 0.61 MG/DL (ref 0.52–1.04)
ERYTHROCYTE [DISTWIDTH] IN BLOOD BY AUTOMATED COUNT: 13.6 % (ref 10–15)
GFR SERPL CREATININE-BSD FRML MDRD: >90 ML/MIN/{1.73_M2}
GLUCOSE SERPL-MCNC: 89 MG/DL (ref 70–99)
HCT VFR BLD AUTO: 36.9 % (ref 35–47)
HDLC SERPL-MCNC: 51 MG/DL
HGB BLD-MCNC: 12.3 G/DL (ref 11.7–15.7)
HIV 1+2 AB+HIV1 P24 AG SERPL QL IA: NONREACTIVE
LDLC SERPL CALC-MCNC: 96 MG/DL
MCH RBC QN AUTO: 29.3 PG (ref 26.5–33)
MCHC RBC AUTO-ENTMCNC: 33.3 G/DL (ref 31.5–36.5)
MCV RBC AUTO: 88 FL (ref 78–100)
NONHDLC SERPL-MCNC: 124 MG/DL
PLATELET # BLD AUTO: 241 10E9/L (ref 150–450)
POTASSIUM SERPL-SCNC: 3.9 MMOL/L (ref 3.4–5.3)
PROT SERPL-MCNC: 6.8 G/DL (ref 6.8–8.8)
RBC # BLD AUTO: 4.2 10E12/L (ref 3.8–5.2)
SODIUM SERPL-SCNC: 138 MMOL/L (ref 133–144)
TRIGL SERPL-MCNC: 142 MG/DL
TSH SERPL DL<=0.005 MIU/L-ACNC: 2.36 MU/L (ref 0.4–4)
WBC # BLD AUTO: 6.3 10E9/L (ref 4–11)

## 2019-01-22 PROCEDURE — 87389 HIV-1 AG W/HIV-1&-2 AB AG IA: CPT | Performed by: FAMILY MEDICINE

## 2019-01-22 PROCEDURE — 80053 COMPREHEN METABOLIC PANEL: CPT | Performed by: FAMILY MEDICINE

## 2019-01-22 PROCEDURE — 87624 HPV HI-RISK TYP POOLED RSLT: CPT | Performed by: FAMILY MEDICINE

## 2019-01-22 PROCEDURE — 99396 PREV VISIT EST AGE 40-64: CPT | Performed by: FAMILY MEDICINE

## 2019-01-22 PROCEDURE — G0145 SCR C/V CYTO,THINLAYER,RESCR: HCPCS | Performed by: FAMILY MEDICINE

## 2019-01-22 PROCEDURE — 85027 COMPLETE CBC AUTOMATED: CPT | Performed by: FAMILY MEDICINE

## 2019-01-22 PROCEDURE — 36415 COLL VENOUS BLD VENIPUNCTURE: CPT | Performed by: FAMILY MEDICINE

## 2019-01-22 PROCEDURE — G0124 SCREEN C/V THIN LAYER BY MD: HCPCS | Performed by: FAMILY MEDICINE

## 2019-01-22 PROCEDURE — 84443 ASSAY THYROID STIM HORMONE: CPT | Performed by: FAMILY MEDICINE

## 2019-01-22 PROCEDURE — 80061 LIPID PANEL: CPT | Performed by: FAMILY MEDICINE

## 2019-01-22 RX ORDER — MOMETASONE FUROATE 1 MG/G
CREAM TOPICAL
Qty: 30 G | Refills: 0 | Status: SHIPPED | OUTPATIENT
Start: 2019-01-22

## 2019-01-22 ASSESSMENT — ANXIETY QUESTIONNAIRES
2. NOT BEING ABLE TO STOP OR CONTROL WORRYING: NOT AT ALL
5. BEING SO RESTLESS THAT IT IS HARD TO SIT STILL: SEVERAL DAYS
3. WORRYING TOO MUCH ABOUT DIFFERENT THINGS: NOT AT ALL
7. FEELING AFRAID AS IF SOMETHING AWFUL MIGHT HAPPEN: SEVERAL DAYS
GAD7 TOTAL SCORE: 7
1. FEELING NERVOUS, ANXIOUS, OR ON EDGE: SEVERAL DAYS
IF YOU CHECKED OFF ANY PROBLEMS ON THIS QUESTIONNAIRE, HOW DIFFICULT HAVE THESE PROBLEMS MADE IT FOR YOU TO DO YOUR WORK, TAKE CARE OF THINGS AT HOME, OR GET ALONG WITH OTHER PEOPLE: SOMEWHAT DIFFICULT
6. BECOMING EASILY ANNOYED OR IRRITABLE: MORE THAN HALF THE DAYS

## 2019-01-22 ASSESSMENT — PATIENT HEALTH QUESTIONNAIRE - PHQ9
SUM OF ALL RESPONSES TO PHQ QUESTIONS 1-9: 2
5. POOR APPETITE OR OVEREATING: MORE THAN HALF THE DAYS

## 2019-01-22 ASSESSMENT — PAIN SCALES - GENERAL: PAINLEVEL: NO PAIN (0)

## 2019-01-22 ASSESSMENT — MIFFLIN-ST. JEOR: SCORE: 1829.75

## 2019-01-22 NOTE — PATIENT INSTRUCTIONS
Fasting labs today.  Refills will be updated when results return.    Mammogram this year recommended.   You can call [phone] to schedule this at the [facility] in [place] (formerly the [facility]).    Contact insurance to determine if shingles vaccine is covered.  Return to clinic for vaccine if desired.        Preventive Health Recommendations  Female Ages 50 - 64    Yearly exam: See your health care provider every year in order to  o Review health changes.   o Discuss preventive care.    o Review your medicines if your doctor has prescribed any.      Get a Pap test every three years (unless you have an abnormal result and your provider advises testing more often).    If you get Pap tests with HPV test, you only need to test every 5 years, unless you have an abnormal result.     You do not need a Pap test if your uterus was removed (hysterectomy) and you have not had cancer.    You should be tested each year for STDs (sexually transmitted diseases) if you're at risk.     Have a mammogram every 1 to 2 years.    Have a colonoscopy at age 50, or have a yearly FIT test (stool test). These exams screen for colon cancer.      Have a cholesterol test every 5 years, or more often if advised.    Have a diabetes test (fasting glucose) every three years. If you are at risk for diabetes, you should have this test more often.     If you are at risk for osteoporosis (brittle bone disease), think about having a bone density scan (DEXA).    Shots: Get a flu shot each year. Get a tetanus shot every 10 years.    Nutrition:     Eat at least 5 servings of fruits and vegetables each day.    Eat whole-grain bread, whole-wheat pasta and brown rice instead of white grains and rice.    Get adequate Calcium and Vitamin D.     Lifestyle    Exercise at least 150 minutes a week (30 minutes a day, 5 days a week). This will help you control your weight and prevent disease.    Limit alcohol to one  drink per day.    No smoking.     Wear sunscreen to prevent skin cancer.     See your dentist every six months for an exam and cleaning.    See your eye doctor every 1 to 2 years.
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
## 2019-01-23 RX ORDER — LEVOTHYROXINE SODIUM 125 UG/1
125 TABLET ORAL DAILY
Qty: 90 TABLET | Refills: 3 | Status: SHIPPED | OUTPATIENT
Start: 2019-01-23 | End: 2020-01-30

## 2019-01-23 ASSESSMENT — ANXIETY QUESTIONNAIRES: GAD7 TOTAL SCORE: 7

## 2019-01-24 NOTE — RESULT ENCOUNTER NOTE
These results are all normal.  Your HIV screening is negative/normal.  Your thyroid testing is normal, indicating you are on the correct dosage of medication.  Your cholesterol levels are improved from previous and in the goal range for you.  Your blood sugar, kidney and liver testing are all normal.  Your blood cell counts are all normal.  Please call or MyChart message me if you have any questions.    PSK

## 2019-01-26 LAB
COPATH REPORT: ABNORMAL
PAP: ABNORMAL

## 2019-01-28 PROBLEM — R87.610 ASCUS OF CERVIX WITH NEGATIVE HIGH RISK HPV: Status: ACTIVE | Noted: 2019-01-22

## 2019-01-28 LAB
FINAL DIAGNOSIS: NORMAL
HPV HR 12 DNA CVX QL NAA+PROBE: NEGATIVE
HPV16 DNA SPEC QL NAA+PROBE: NEGATIVE
HPV18 DNA SPEC QL NAA+PROBE: NEGATIVE
SPECIMEN DESCRIPTION: NORMAL
SPECIMEN SOURCE CVX/VAG CYTO: NORMAL

## 2019-02-18 ENCOUNTER — MYC MEDICAL ADVICE (OUTPATIENT)
Dept: FAMILY MEDICINE | Facility: CLINIC | Age: 54
End: 2019-02-18

## 2019-02-18 DIAGNOSIS — F98.8 ATTENTION DEFICIT DISORDER (ADD) WITHOUT HYPERACTIVITY: ICD-10-CM

## 2019-02-18 RX ORDER — DEXTROAMPHETAMINE SACCHARATE, AMPHETAMINE ASPARTATE MONOHYDRATE, DEXTROAMPHETAMINE SULFATE AND AMPHETAMINE SULFATE 3.75; 3.75; 3.75; 3.75 MG/1; MG/1; MG/1; MG/1
15 CAPSULE, EXTENDED RELEASE ORAL DAILY
Qty: 30 CAPSULE | Refills: 0 | Status: SHIPPED | OUTPATIENT
Start: 2019-02-18 | End: 2019-03-18

## 2019-03-18 ENCOUNTER — MYC MEDICAL ADVICE (OUTPATIENT)
Dept: FAMILY MEDICINE | Facility: CLINIC | Age: 54
End: 2019-03-18

## 2019-03-18 DIAGNOSIS — F98.8 ATTENTION DEFICIT DISORDER (ADD) WITHOUT HYPERACTIVITY: ICD-10-CM

## 2019-03-18 RX ORDER — DEXTROAMPHETAMINE SACCHARATE, AMPHETAMINE ASPARTATE MONOHYDRATE, DEXTROAMPHETAMINE SULFATE AND AMPHETAMINE SULFATE 3.75; 3.75; 3.75; 3.75 MG/1; MG/1; MG/1; MG/1
15 CAPSULE, EXTENDED RELEASE ORAL DAILY
Qty: 30 CAPSULE | Refills: 0 | Status: SHIPPED | OUTPATIENT
Start: 2019-03-18 | End: 2019-04-18

## 2019-03-22 ENCOUNTER — TELEPHONE (OUTPATIENT)
Dept: FAMILY MEDICINE | Facility: CLINIC | Age: 54
End: 2019-03-22

## 2019-03-22 NOTE — TELEPHONE ENCOUNTER
Plan does not cover amphetamine-dextroamphetamine (ADDERALL XR) 15 MG 24 hr capsule.  Please call [phone] to initiate Prior Auth or change med.    Insurance type and ID number: [id number] preferred one      Additional Information:     Herminia Coyne

## 2019-03-27 NOTE — TELEPHONE ENCOUNTER
PA Initiation    Medication: amphetamine-dextroamphetamine (ADDERALL XR) 15 MG 24 hr capsule- INITIATED  Insurance Company: [employer] - Phone [phone] Fax [phone]  Pharmacy Filling the Rx: [facility] [address] [place] [address] [address] AT [facility] [place] [address]  Filling Pharmacy Phone: [phone]  Filling Pharmacy Fax:    Start Date: 3/27/2019    Tracking Number is [id number]

## 2019-03-27 NOTE — TELEPHONE ENCOUNTER
Prior Authorization Approval    Authorization Effective Date: 3/27/2019  Authorization Expiration Date: 3/27/2021  Medication: amphetamine-dextroamphetamine (ADDERALL XR) 15 MG 24 hr capsule- APPROVED  Approved Dose/Quantity:   Reference #: [id number]   Insurance Company: [employer] - Phone [phone] Fax [phone]  Expected CoPay:       CoPay Card Available:      Foundation Assistance Needed:    Which Pharmacy is filling the prescription (Not needed for infusion/clinic administered): [facility] [address] [place] [address] [address] AT [facility] [address]  Pharmacy Notified: Yes  Patient Notified: Yes

## 2019-04-18 ENCOUNTER — MYC MEDICAL ADVICE (OUTPATIENT)
Dept: FAMILY MEDICINE | Facility: CLINIC | Age: 54
End: 2019-04-18

## 2019-04-18 DIAGNOSIS — F98.8 ATTENTION DEFICIT DISORDER (ADD) WITHOUT HYPERACTIVITY: ICD-10-CM

## 2019-04-18 RX ORDER — DEXTROAMPHETAMINE SACCHARATE, AMPHETAMINE ASPARTATE MONOHYDRATE, DEXTROAMPHETAMINE SULFATE AND AMPHETAMINE SULFATE 3.75; 3.75; 3.75; 3.75 MG/1; MG/1; MG/1; MG/1
15 CAPSULE, EXTENDED RELEASE ORAL DAILY
Qty: 30 CAPSULE | Refills: 0 | Status: SHIPPED | OUTPATIENT
Start: 2019-04-18 | End: 2019-05-17

## 2019-04-26 ENCOUNTER — TELEPHONE (OUTPATIENT)
Dept: FAMILY MEDICINE | Facility: CLINIC | Age: 54
End: 2019-04-26

## 2019-04-26 NOTE — TELEPHONE ENCOUNTER
PA for amphetamine-dextroamphetamine (ADDERALL XR) 15 MG 24 hr capsule    Phone# [phone]    ID# [id number]    PA or alternative    Sandra Dumont

## 2019-04-26 NOTE — TELEPHONE ENCOUNTER
PA was completed March 2019 - see telephone encounter.  Contact pharmacy and/or PA team to address this.    ANGK

## 2019-05-01 NOTE — TELEPHONE ENCOUNTER
DUPLICATE PA REQUEST; SEE TELEPHONE ENCOUNTER ON 03/22/2019    Prior Authorization Not Needed  05/01/2019  Medication: amphetamine-dextroamphetamine (ADDERALL XR) 15 MG 24 hr capsule  Insurance Company: [employer] - Phone [phone] Fax [phone]  Expected CoPay:      Pharmacy Filling the Rx: [facility] [address] [place] [address] [address] AT [facility] [address]    Pharmacy did an override and received a paid claim.

## 2019-05-17 ENCOUNTER — MYC MEDICAL ADVICE (OUTPATIENT)
Dept: FAMILY MEDICINE | Facility: CLINIC | Age: 54
End: 2019-05-17

## 2019-05-17 DIAGNOSIS — F98.8 ATTENTION DEFICIT DISORDER (ADD) WITHOUT HYPERACTIVITY: ICD-10-CM

## 2019-05-17 RX ORDER — DEXTROAMPHETAMINE SACCHARATE, AMPHETAMINE ASPARTATE MONOHYDRATE, DEXTROAMPHETAMINE SULFATE AND AMPHETAMINE SULFATE 3.75; 3.75; 3.75; 3.75 MG/1; MG/1; MG/1; MG/1
15 CAPSULE, EXTENDED RELEASE ORAL DAILY
Qty: 30 CAPSULE | Refills: 0 | Status: SHIPPED | OUTPATIENT
Start: 2019-05-17 | End: 2019-06-17

## 2019-06-17 ENCOUNTER — MYC MEDICAL ADVICE (OUTPATIENT)
Dept: FAMILY MEDICINE | Facility: CLINIC | Age: 54
End: 2019-06-17

## 2019-06-17 DIAGNOSIS — F98.8 ATTENTION DEFICIT DISORDER (ADD) WITHOUT HYPERACTIVITY: ICD-10-CM

## 2019-06-17 RX ORDER — DEXTROAMPHETAMINE SACCHARATE, AMPHETAMINE ASPARTATE MONOHYDRATE, DEXTROAMPHETAMINE SULFATE AND AMPHETAMINE SULFATE 3.75; 3.75; 3.75; 3.75 MG/1; MG/1; MG/1; MG/1
15 CAPSULE, EXTENDED RELEASE ORAL DAILY
Qty: 30 CAPSULE | Refills: 0 | Status: SHIPPED | OUTPATIENT
Start: 2019-06-17 | End: 2019-07-18

## 2019-06-25 ENCOUNTER — APPOINTMENT (OUTPATIENT)
Age: 54
Setting detail: DERMATOLOGY
End: 2019-06-25

## 2019-06-25 DIAGNOSIS — Z41.9 ENCOUNTER FOR PROCEDURE FOR PURPOSES OTHER THAN REMEDYING HEALTH STATE, UNSPECIFIED: ICD-10-CM

## 2019-06-25 PROCEDURE — OTHER ADDITIONAL NOTES: OTHER

## 2019-06-25 PROCEDURE — OTHER BOTOX (U OR CC): OTHER

## 2019-06-25 NOTE — PROCEDURE: ADDITIONAL NOTES
Additional Notes: Patient was very happy with the Botox at the last visit like a repeat treatment today. Discussed filler again under eyes, medical cheeks (with cannula), nasolabial folds, and marionatte lines. Recommended using 1 syring of volume for medial cheeks, 1 syringe (1mL likely) for microdroplet under eye, then vollure for nasolabial folds and marionette lines. Recommended booking 1 hr 15 minute appointment. Patient is a [employer] member. Discussed risks and expectations of bruising and possible assymetry. Recommended injecting on a Friday.
Detail Level: Simple

## 2019-06-25 NOTE — PROCEDURE: BOTOX (U OR CC)
Post-Care Instructions: - Skin was cleansed with 70% isopropyl alcohol prior to injection today.\\n- Patient was instructed to not lie down for touch area for 6 hrs.\\n\\nTOTAL BOTOX USED TODAY: 48 units\\n\\nCOLOR KEY FOR DIAGRAM BELOW:\\nRed = 1/2 unit of Botox per injection\\nOrange = 1 units of Botox per injection\\nYellow = 2 units of Botox per injection\\nGreen = 3 units of Botox per injection\\nBlue = 4 units of Botox per injection\\nBrown = 5 units of Botox per injection\\nBlack = 6 units of Botox per injection

## 2019-06-25 NOTE — PROCEDURE: BOTOX (U OR CC)
Price (Use Numbers Only, No Special Characters Or $): 612 Price (Use Numbers Only, No Special Characters Or $): 389

## 2019-07-09 ENCOUNTER — APPOINTMENT (OUTPATIENT)
Age: 54
Setting detail: DERMATOLOGY
End: 2019-07-09

## 2019-07-09 DIAGNOSIS — Z41.9 ENCOUNTER FOR PROCEDURE FOR PURPOSES OTHER THAN REMEDYING HEALTH STATE, UNSPECIFIED: ICD-10-CM

## 2019-07-09 PROCEDURE — OTHER JUVEDERM VOLBELLA INJECTION: OTHER

## 2019-07-09 PROCEDURE — OTHER JUVEDERM VOLLURE XC INJECTION: OTHER

## 2019-07-09 PROCEDURE — OTHER JUVEDERM VOLUMA XC INJECTION: OTHER

## 2019-07-09 PROCEDURE — OTHER ADDITIONAL NOTES: OTHER

## 2019-07-09 NOTE — PROCEDURE: JUVEDERM VOLBELLA INJECTION
Post-Care Instructions: \\n- Patient instructed to apply ice to reduce swelling.\\n- Patient was instructed to not apply ice directly to the skin. \\n\\n(1 Volbella 1.0cc syringe)\\n\\nPrice: $700

## 2019-07-09 NOTE — PROCEDURE: JUVEDERM VOLUMA XC INJECTION
Post-Care Instructions: - Patient was instructed to apply ice to reduce swelling. Ice should not be appleid directly to the skin.\\n\\n(1 Voluma 1.0cc syringe)\\n\\nPrice: $896 Post-Care Instructions: - Patient was instructed to apply ice to reduce swelling. Ice should not be appleid directly to the skin.\\n\\n(1 Voluma 1.0cc syringe)\\n\\nPrice: $898

## 2019-07-09 NOTE — PROCEDURE: JUVEDERM VOLUMA XC INJECTION
Consent: - Verbal and written consent obtained after discussion of risks. \\n- Risks include but not limited to bruising, bleeding, asymmetry, infection, allergic reaction, palpation of product, lumps, scar formation, Elora effect, incomplete augmentation, temporary nature, procedural pain, ulceration, and necrosis of skin.

## 2019-07-09 NOTE — PROCEDURE: JUVEDERM VOLLURE XC INJECTION
Tear Troughs Filler  Volume In Cc: 0
Anesthesia Volume In Cc: 0.5
Include Cannula Information In Note?: No
Consent: - Verbal and written consent obtained after discussion of risks. \\n- Risks include but not limited to bruising, bleeding, asymmetry, infection, allergic reaction, palpation of product, lumps, scar formation, Jadwin effect, incomplete augmentation, temporary nature, procedural pain, ulceration, and necrosis of skin.
Map Statment: See attached map for complete details
Price (Use Numbers Only, No Special Characters Or $): 1500
Filler: Juvederm Vollure XC
Including Pricing Information In The Note: Yes
Post-Care Instructions: - Patient was instructed to apply ice to reduce swelling. Ice should not be appleid directly to the skin.\\n\\n(2 Vollure 1.0cc syringes)
Procedural Text: \\n- All areas were cleansed with chlorhexidine wipes prior to injection. \\n- Product was aspirated with syringe prior to each injection to reduce risk of intravascular injection. \\n
Detail Level: Detailed
Additional Anesthesia Volume In Cc: 6
Anesthesia Type: 2% lidocaine with epinephrine

## 2019-07-09 NOTE — PROCEDURE: JUVEDERM VOLUMA XC INJECTION
Price (Use Numbers Only, No Special Characters Or $): 985 Price (Use Numbers Only, No Special Characters Or $): 020

## 2019-07-09 NOTE — PROCEDURE: JUVEDERM VOLUMA XC INJECTION
Procedural Text: - All areas were cleansed with chlorhexidine wipes prior to injection. \\n- Product was aspirated with syringe prior to each injection to reduce risk of intravascular injection.

## 2019-07-09 NOTE — PROCEDURE: JUVEDERM VOLBELLA INJECTION
Price (Use Numbers Only, No Special Characters Or $): 916 Price (Use Numbers Only, No Special Characters Or $): 809

## 2019-07-09 NOTE — PROCEDURE: JUVEDERM VOLBELLA INJECTION
Consent: - Verbal and written consent obtained after discussion of risks. \\n- Risks include but not limited to bruising, bleeding, asymmetry, infection, allergic reaction, palpation of product, lumps, scar formation, Sanford effect, incomplete augmentation, temporary nature, procedural pain, ulceration, and necrosis of skin.

## 2019-07-16 ENCOUNTER — MYC REFILL (OUTPATIENT)
Dept: FAMILY MEDICINE | Facility: CLINIC | Age: 54
End: 2019-07-16

## 2019-07-16 DIAGNOSIS — F98.8 ATTENTION DEFICIT DISORDER (ADD) WITHOUT HYPERACTIVITY: ICD-10-CM

## 2019-07-18 RX ORDER — DEXTROAMPHETAMINE SACCHARATE, AMPHETAMINE ASPARTATE MONOHYDRATE, DEXTROAMPHETAMINE SULFATE AND AMPHETAMINE SULFATE 3.75; 3.75; 3.75; 3.75 MG/1; MG/1; MG/1; MG/1
15 CAPSULE, EXTENDED RELEASE ORAL DAILY
Qty: 30 CAPSULE | Refills: 0 | Status: SHIPPED | OUTPATIENT
Start: 2019-07-18 | End: 2019-08-19

## 2019-07-18 NOTE — TELEPHONE ENCOUNTER
Please call patient re:  follow up is due for this refill.  I am printing now for mailing to pharmacy but she needs to schedule appointment.  Mail script to [facility] [place].  MAX

## 2019-07-19 NOTE — TELEPHONE ENCOUNTER
This writer attempted to contact pt on 07/19/19      Reason for call schedule OV f/u. Refill was mailed to preferred pharmacy and left message.      If patient calls back:   Schedule Office Visit appointment within 1 month with PCP, document that pt called and close encounter     Mych[employer] message also sent.  Adderall XR 15 mg 24hr capsule RX mailed to [place] [id number] in [facility].    Herminia REYNA)(PHYLLIS)

## 2019-08-19 ENCOUNTER — OFFICE VISIT (OUTPATIENT)
Dept: FAMILY MEDICINE | Facility: CLINIC | Age: 54
End: 2019-08-19
Payer: COMMERCIAL

## 2019-08-19 VITALS
HEIGHT: 66 IN | TEMPERATURE: 98.6 F | DIASTOLIC BLOOD PRESSURE: 83 MMHG | OXYGEN SATURATION: 97 % | WEIGHT: 196.8 LBS | BODY MASS INDEX: 31.63 KG/M2 | RESPIRATION RATE: 20 BRPM | SYSTOLIC BLOOD PRESSURE: 131 MMHG | HEART RATE: 79 BPM

## 2019-08-19 DIAGNOSIS — M79.671 PAIN OF RIGHT HEEL: ICD-10-CM

## 2019-08-19 DIAGNOSIS — F43.22 ADJUSTMENT DISORDER WITH ANXIETY: ICD-10-CM

## 2019-08-19 DIAGNOSIS — F98.8 ATTENTION DEFICIT DISORDER (ADD) WITHOUT HYPERACTIVITY: Primary | ICD-10-CM

## 2019-08-19 DIAGNOSIS — L30.9 ECZEMA OF BOTH HANDS: ICD-10-CM

## 2019-08-19 PROCEDURE — 99214 OFFICE O/P EST MOD 30 MIN: CPT | Performed by: FAMILY MEDICINE

## 2019-08-19 RX ORDER — DEXTROAMPHETAMINE SACCHARATE, AMPHETAMINE ASPARTATE MONOHYDRATE, DEXTROAMPHETAMINE SULFATE AND AMPHETAMINE SULFATE 3.75; 3.75; 3.75; 3.75 MG/1; MG/1; MG/1; MG/1
15 CAPSULE, EXTENDED RELEASE ORAL DAILY
Qty: 30 CAPSULE | Refills: 0 | Status: SHIPPED | OUTPATIENT
Start: 2019-08-19 | End: 2019-10-02

## 2019-08-19 RX ORDER — CLOBETASOL PROPIONATE 0.5 MG/G
OINTMENT TOPICAL 2 TIMES DAILY
Qty: 30 G | Refills: 0 | Status: SHIPPED | OUTPATIENT
Start: 2019-08-19 | End: 2021-06-02

## 2019-08-19 RX ORDER — DEXTROAMPHETAMINE SACCHARATE, AMPHETAMINE ASPARTATE, DEXTROAMPHETAMINE SULFATE AND AMPHETAMINE SULFATE 2.5; 2.5; 2.5; 2.5 MG/1; MG/1; MG/1; MG/1
10 TABLET ORAL DAILY
Qty: 30 TABLET | Refills: 0 | Status: SHIPPED | OUTPATIENT
Start: 2019-08-19 | End: 2019-10-02

## 2019-08-19 ASSESSMENT — MIFFLIN-ST. JEOR: SCORE: 1524.43

## 2019-08-19 ASSESSMENT — PAIN SCALES - GENERAL: PAINLEVEL: NO PAIN (0)

## 2019-08-19 NOTE — PATIENT INSTRUCTIONS
Refill Adderall XR - I will send this monthly for you.    Begin the short acting Adderall if needed for evening class.  Send a [employer] message or call when this refill is needed.    Continue the Fluoxetine as previous.    For the right heel you can use Ibuprofen 600 mg (3 pills) three times a day with food for 5-7 days consistently for the antiinflammatory effect.  Then you can continue to use as needed.

## 2019-08-19 NOTE — PROGRESS NOTES
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    -Used to take a supplemental that she may want to get back on           Anxiety Follow-Up    How are you doing with your anxiety since your last visit? Improved     Are you having other symptoms that might be associated with anxiety? No    Have you had a significant life event? No     Are you feeling depressed? No    Do you have any concerns with your use of alcohol or other drugs? No    Social History     Tobacco Use     Smoking status: Never Smoker     Smokeless tobacco: Never Used   Substance Use Topics     Alcohol use: Yes     Comment: INFREQUENTLY     Drug use: No     GALE-7 SCORE 10/10/2017 6/21/2018 1/22/2019   Total Score - - -   Total Score 9 7 7     PHQ 10/10/2017 6/21/2018 1/22/2019   PHQ-9 Total Score 1 5 2   Q9: Thoughts of better off dead/self-harm past 2 weeks Not at all Not at all Not at all               Weight concerns:  Weight loss with diet and exercise.  Wt Readings from Last 5 Encounters:   08/19/19 89.3 kg (196 lb 12.8 oz)   01/22/19 119.4 kg (263 lb 3.2 oz)   06/21/18 120.2 kg (265 lb)   10/10/17 117.5 kg (259 lb)   03/09/17 117.5 kg (259 lb)           BP Readings from Last 3 Encounters:   08/19/19 131/83   01/22/19 138/76   11/12/18 104/80    Wt Readings from Last 3 Encounters:   08/19/19 89.3 kg (196 lb 12.8 oz)   01/22/19 119.4 kg (263 lb 3.2 oz)   06/21/18 120.2 kg (265 lb)                  Reviewed and updated as needed this visit by Provider  Tobacco  Allergies  Meds  Med Hx  Surg Hx  Fam Hx  Soc Hx        Review of Systems   ROS COMP: Constitutional, HEENT, cardiovascular, pulmonary, gi and gu systems are negative, except as otherwise noted.  Pain on the right heel area with exercise at times.  No swelling, no injury.  Changed type of shoes with some  "improvement.       Objective    /83 (BP Location: Right arm, Patient Position: Sitting, Cuff Size: Adult Large)   Pulse 79   Temp 98.6  F (37  C) (Oral)   Resp 20   Ht 1.676 m (5' 6\")   Wt 89.3 kg (196 lb 12.8 oz)   LMP 08/08/2019   SpO2 97%   BMI 31.76 kg/m    Body mass index is 31.76 kg/m .  Physical Exam   GENERAL: alert, no distress and obese  NECK: no adenopathy, no asymmetry, masses, or scars and thyroid normal to palpation  RESP: lungs clear to auscultation - no rales, rhonchi or wheezes  CV: regular rate and rhythm, normal S1 S2, no S3 or S4, no murmur, click or rub, no peripheral edema and peripheral pulses strong  ABDOMEN: soft, nontender, no hepatosplenomegaly, no masses and bowel sounds normal  MS: no gross musculoskeletal defects noted, no edema.  Mild tenderness over the posterior calcaneus.  No swelling or erythema.  Achilles intact.  NEURO: Normal strength and tone, sensory exam grossly normal, mentation intact and no tic or tremor.  BACK: no CVA tenderness, no paralumbar tenderness  PSYCH: mentation appears normal, affect normal/bright  SKIN:  Scaling areas on hands. No erythema or skin breakdown.  No secondary infection noted.      Diagnostic Test Results:  Labs reviewed in Epic        Assessment & Plan     1. Attention deficit disorder (ADD) without hyperactivity  Refill of XR now.  Will give script for additional short acting 10 mg dose for use for pm for nights she has class.  Will continue routine fill of the XR version but patient will call/MyChart request refill for the 10 mg immediate release when needed.    - amphetamine-dextroamphetamine (ADDERALL XR) 15 MG 24 hr capsule; Take 1 capsule (15 mg) by mouth daily  Dispense: 30 capsule; Refill: 0  - amphetamine-dextroamphetamine (ADDERALL) 10 MG tablet; Take 1 tablet (10 mg) by mouth daily By 3 pm if needed for evening activites  Dispense: 30 tablet; Refill: 0    2. Eczema of both hands  Refill for prn use.  Worse in winter.    - " "clobetasol (TEMOVATE) 0.05 % external ointment; Apply topically 2 times daily  Dispense: 30 g; Refill: 0    3. Adjustment disorder with anxiety  Continue current treatment.    - FLUoxetine (PROZAC) 20 MG capsule; Take 1 capsule (20 mg) by mouth daily  Dispense: 90 capsule; Refill: 1     4.  Heel pain  Supportive shoes. Prn ibuprofen.  Follow up if ongoing symptoms, consider PHYSICAL THERAPY or podiatry  BMI:   Estimated body mass index is 31.76 kg/m  as calculated from the following:    Height as of this encounter: 1.676 m (5' 6\").    Weight as of this encounter: 89.3 kg (196 lb 12.8 oz).   Weight management plan: Discussed healthy diet and exercise guidelines        Patient Instructions   Refill Adderall XR - I will send this monthly for you.    Begin the short acting Adderall if needed for evening class.  Send a [employer] message or call when this refill is needed.    Continue the Fluoxetine as previous.    For the right heel you can use Ibuprofen 600 mg (3 pills) three times a day with food for 5-7 days consistently for the antiinflammatory effect.  Then you can continue to use as needed.      Return in about 6 months (around 2/19/2020) for Physical Exam, Lab Work, chronic health problems.    Esther Armstrong MD  [facility]          "

## 2019-08-20 ENCOUNTER — TELEPHONE (OUTPATIENT)
Dept: FAMILY MEDICINE | Facility: CLINIC | Age: 54
End: 2019-08-20

## 2019-08-20 NOTE — TELEPHONE ENCOUNTER
Plan does not cover amphetamine-dextroamphetamine (ADDERALL) 10 MG tablet.  Please call [phone] to initiate Prior Auth or change med.    Insurance type and ID number: [id number]      Additional Information:     Herminia Coyne

## 2019-08-27 NOTE — TELEPHONE ENCOUNTER
Central Prior Authorization Team   Phone: [phone]      PA Initiation    Medication: amphetamine-dextroamphetamine (ADDERALL) 10 MG tablet-Initiated  Insurance Company: [employer] - Phone [phone] Fax [phone]  Pharmacy Filling the Rx: [id number] [place], [place] - [address] AT [facility] [address]  Filling Pharmacy Phone: [phone]  Filling Pharmacy Fax:    Start Date: 8/27/2019

## 2019-09-16 ENCOUNTER — APPOINTMENT (OUTPATIENT)
Age: 54
Setting detail: DERMATOLOGY
End: 2019-09-16

## 2019-09-16 VITALS — HEIGHT: 67 IN | WEIGHT: 184 LBS | RESPIRATION RATE: 16 BRPM

## 2019-09-16 DIAGNOSIS — Z41.9 ENCOUNTER FOR PROCEDURE FOR PURPOSES OTHER THAN REMEDYING HEALTH STATE, UNSPECIFIED: ICD-10-CM

## 2019-09-16 DIAGNOSIS — L72.0 EPIDERMAL CYST: ICD-10-CM

## 2019-09-16 PROCEDURE — OTHER BOTOX (U OR CC): OTHER

## 2019-09-16 PROCEDURE — OTHER BENIGN DESTRUCTION COSMETIC: OTHER

## 2019-09-16 PROCEDURE — OTHER COUNSELING: OTHER

## 2019-09-16 ASSESSMENT — LOCATION DETAILED DESCRIPTION DERM
LOCATION DETAILED: RIGHT MEDIAL FOREHEAD
LOCATION DETAILED: LEFT INFERIOR MEDIAL FOREHEAD

## 2019-09-16 ASSESSMENT — LOCATION SIMPLE DESCRIPTION DERM
LOCATION SIMPLE: LEFT FOREHEAD
LOCATION SIMPLE: RIGHT FOREHEAD

## 2019-09-16 ASSESSMENT — LOCATION ZONE DERM: LOCATION ZONE: FACE

## 2019-09-16 NOTE — PROCEDURE: BOTOX (U OR CC)
Price (Use Numbers Only, No Special Characters Or $): 085 Price (Use Numbers Only, No Special Characters Or $): 984

## 2019-09-16 NOTE — PROCEDURE: BENIGN DESTRUCTION COSMETIC
Detail Level: Detailed
Anesthesia Volume In Cc: 0.5
Price (Use Numbers Only, No Special Characters Or $): 50
Post-Care Instructions: - Avoid picking at any of the treated lesions. \\n- May apply Vaseline ointment or Aquaphor to crusted or scabbing areas.
Consent: - Verbal and written consent was obtained, and risks were reviewed prior to procedure today. \\n- Risks discussed include but are not limited to pain, crusting, scabbing, blistering, scarring, temporary or permanent darker or lighter pigmentary change, recurrence, incomplete resolution, and infection.

## 2019-09-16 NOTE — HPI: COSMETIC (BOTOX)
Additional History: Notices the effect within 4-5 days. Patient has been very happy with her previous treatments of Botox and filler. She post that she recently started to notice that the Botox was starting to wear off, so she is here for reinjection today. We discussed re-evaluating filler summer 2020 and will add more filler where needed if needed. Would likely be a good time to add more Volbella and touchup other zones with vollure or voluma to give a consistent look.

## 2019-09-29 ENCOUNTER — HEALTH MAINTENANCE LETTER (OUTPATIENT)
Age: 54
End: 2019-09-29

## 2019-10-02 ENCOUNTER — MYC REFILL (OUTPATIENT)
Dept: FAMILY MEDICINE | Facility: CLINIC | Age: 54
End: 2019-10-02

## 2019-10-02 DIAGNOSIS — F98.8 ATTENTION DEFICIT DISORDER (ADD) WITHOUT HYPERACTIVITY: ICD-10-CM

## 2019-10-02 NOTE — TELEPHONE ENCOUNTER
Requested Prescriptions   Pending Prescriptions Disp Refills     amphetamine-dextroamphetamine (ADDERALL XR) 15 MG 24 hr capsule 30 capsule 0     Sig: Take 1 capsule (15 mg) by mouth daily       There is no refill protocol information for this order        amphetamine-dextroamphetamine (ADDERALL) 10 MG tablet 30 tablet 0     Sig: Take 1 tablet (10 mg) by mouth daily By 3 pm if needed for evening activites       There is no refill protocol information for this order          amphetamine-dextroamphetamine (ADDERALL XR) 15 MG 24 hr capsule     Last Written Prescription Date:  8/19/19  Last Fill Quantity: 30,   # refills: 0  Last Office Visit: 8/19/19  Future Office visit:       Routing refill request to provider for review/approval because:  Drug not on the [facility], [facility] or [employer] refill protocol or controlled substance      amphetamine-dextroamphetamine (ADDERALL) 10 MG tablet      Last Written Prescription Date:  8/19/19  Last Fill Quantity: 30,   # refills: 0  Last Office Visit: 8/19/19  Future Office visit:       Routing refill request to provider for review/approval because:  Drug not on the [facility], P or [employer] refill protocol or controlled substance

## 2019-10-04 ENCOUNTER — MYC MEDICAL ADVICE (OUTPATIENT)
Dept: FAMILY MEDICINE | Facility: CLINIC | Age: 54
End: 2019-10-04

## 2019-10-04 RX ORDER — DEXTROAMPHETAMINE SACCHARATE, AMPHETAMINE ASPARTATE, DEXTROAMPHETAMINE SULFATE AND AMPHETAMINE SULFATE 2.5; 2.5; 2.5; 2.5 MG/1; MG/1; MG/1; MG/1
10 TABLET ORAL DAILY
Qty: 30 TABLET | Refills: 0 | Status: SHIPPED | OUTPATIENT
Start: 2019-10-04 | End: 2019-12-06

## 2019-10-04 RX ORDER — DEXTROAMPHETAMINE SACCHARATE, AMPHETAMINE ASPARTATE MONOHYDRATE, DEXTROAMPHETAMINE SULFATE AND AMPHETAMINE SULFATE 3.75; 3.75; 3.75; 3.75 MG/1; MG/1; MG/1; MG/1
15 CAPSULE, EXTENDED RELEASE ORAL DAILY
Qty: 30 CAPSULE | Refills: 0 | Status: SHIPPED | OUTPATIENT
Start: 2019-10-04 | End: 2019-11-01

## 2019-10-04 NOTE — TELEPHONE ENCOUNTER
Routing refill request to provider for review/approval because:  Drug not on the [facility] refill protocol   Josi Salinas RN           DISPLAY PLAN FREE TEXT

## 2019-11-01 ENCOUNTER — MYC REFILL (OUTPATIENT)
Dept: FAMILY MEDICINE | Facility: CLINIC | Age: 54
End: 2019-11-01

## 2019-11-01 DIAGNOSIS — F98.8 ATTENTION DEFICIT DISORDER (ADD) WITHOUT HYPERACTIVITY: ICD-10-CM

## 2019-11-04 ENCOUNTER — MYC MEDICAL ADVICE (OUTPATIENT)
Dept: FAMILY MEDICINE | Facility: CLINIC | Age: 54
End: 2019-11-04

## 2019-11-04 RX ORDER — DEXTROAMPHETAMINE SACCHARATE, AMPHETAMINE ASPARTATE MONOHYDRATE, DEXTROAMPHETAMINE SULFATE AND AMPHETAMINE SULFATE 3.75; 3.75; 3.75; 3.75 MG/1; MG/1; MG/1; MG/1
15 CAPSULE, EXTENDED RELEASE ORAL DAILY
Qty: 30 CAPSULE | Refills: 0 | Status: SHIPPED | OUTPATIENT
Start: 2019-11-04 | End: 2019-12-06

## 2019-11-04 NOTE — TELEPHONE ENCOUNTER
Routing refill request to provider for review/approval because:  Drug not on the [facility] refill protocol       Edyta Melvin RN

## 2019-12-06 ENCOUNTER — MYC REFILL (OUTPATIENT)
Dept: FAMILY MEDICINE | Facility: CLINIC | Age: 54
End: 2019-12-06

## 2019-12-06 DIAGNOSIS — F98.8 ATTENTION DEFICIT DISORDER (ADD) WITHOUT HYPERACTIVITY: ICD-10-CM

## 2019-12-06 NOTE — TELEPHONE ENCOUNTER
Requested Prescriptions   Pending Prescriptions Disp Refills     amphetamine-dextroamphetamine (ADDERALL XR) 15 MG 24 hr capsule      Last Written Prescription Date:  11/04/19  Last Fill Quantity: 30 capsule,   # refills: 00  Last Office Visit: Dr. Armstrong  Future Office visit:       Routing refill request to provider for review/approval because:  Drug not on the [facility], [facility] or [facility] refill protocol or controlled substance 30 capsule 0     Sig: Take 1 capsule (15 mg) by mouth daily       There is no refill protocol information for this order

## 2019-12-06 NOTE — TELEPHONE ENCOUNTER
Requested Prescriptions   Pending Prescriptions Disp Refills     amphetamine-dextroamphetamine (ADDERALL) 10 MG tablet      Last Written Prescription Date:  10/4/19  Last Fill Quantity: 30 tablet,   # refills: 0  Last Office Visit: Dr. Armstrong  Future Office visit:       Routing refill request to provider for review/approval because:  Drug not on the [facility], [facility] or [facility] refill protocol or controlled substance 30 tablet 0     Sig: Take 1 tablet (10 mg) by mouth daily By 3 pm if needed for evening activites       There is no refill protocol information for this order

## 2019-12-07 RX ORDER — DEXTROAMPHETAMINE SACCHARATE, AMPHETAMINE ASPARTATE MONOHYDRATE, DEXTROAMPHETAMINE SULFATE AND AMPHETAMINE SULFATE 3.75; 3.75; 3.75; 3.75 MG/1; MG/1; MG/1; MG/1
15 CAPSULE, EXTENDED RELEASE ORAL DAILY
Qty: 30 CAPSULE | Refills: 0 | Status: SHIPPED | OUTPATIENT
Start: 2019-12-07 | End: 2020-01-08

## 2019-12-07 RX ORDER — DEXTROAMPHETAMINE SACCHARATE, AMPHETAMINE ASPARTATE, DEXTROAMPHETAMINE SULFATE AND AMPHETAMINE SULFATE 2.5; 2.5; 2.5; 2.5 MG/1; MG/1; MG/1; MG/1
10 TABLET ORAL DAILY
Qty: 30 TABLET | Refills: 0 | Status: SHIPPED | OUTPATIENT
Start: 2019-12-07 | End: 2020-01-08

## 2019-12-18 NOTE — TELEPHONE ENCOUNTER
Prior Authorization Approval    Authorization Effective Date: 8/28/2019  Authorization Expiration Date: 8/28/2021  Medication: amphetamine-dextroamphetamine (ADDERALL) 10 MG tablet-APPROVED  Approved Dose/Quantity:   Reference #:     Insurance Company: [employer] - Phone [phone] Fax [phone]  Expected CoPay:       CoPay Card Available:      Foundation Assistance Needed:    Which Pharmacy is filling the prescription (Not needed for infusion/clinic administered): [id number] - [facility] [address] AT [facility] [address]  Pharmacy Notified: Yes  Patient Notified: No    Pharmacy will notify patient when medication is ready.     Glycopyrrolate Counseling:  I discussed with the patient the risks of glycopyrrolate including but not limited to skin rash, drowsiness, dry mouth, difficulty urinating, and blurred vision.

## 2020-01-08 ENCOUNTER — MYC REFILL (OUTPATIENT)
Dept: FAMILY MEDICINE | Facility: CLINIC | Age: 55
End: 2020-01-08

## 2020-01-08 DIAGNOSIS — F98.8 ATTENTION DEFICIT DISORDER (ADD) WITHOUT HYPERACTIVITY: ICD-10-CM

## 2020-01-08 NOTE — TELEPHONE ENCOUNTER
Requested Prescriptions   Pending Prescriptions Disp Refills     amphetamine-dextroamphetamine (ADDERALL XR) 15 MG 24 hr capsule 30 capsule 0     Sig: Take 1 capsule (15 mg) by mouth daily       There is no refill protocol information for this order        amphetamine-dextroamphetamine (ADDERALL) 10 MG tablet 30 tablet 0     Sig: Take 1 tablet (10 mg) by mouth daily By 3 pm if needed for evening activites       There is no refill protocol information for this order          amphetamine-dextroamphetamine (ADDERALL XR) 15 MG 24 hr capsule      Last Written Prescription Date:  12/7/19  Last Fill Quantity: 30,   # refills: 0  Last Office Visit: 8/19/19  Future Office visit:       Routing refill request to provider for review/approval because:  Drug not on the [facility], [facility] or [employer] refill protocol or controlled substance    amphetamine-dextroamphetamine (ADDERALL) 10 MG tablet      Last Written Prescription Date:  12/7/19  Last Fill Quantity: 30,   # refills: 0  Last Office Visit: 8/19/19  Future Office visit:       Routing refill request to provider for review/approval because:  Drug not on the [facility], P or [employer] refill protocol or controlled substance

## 2020-01-09 RX ORDER — DEXTROAMPHETAMINE SACCHARATE, AMPHETAMINE ASPARTATE MONOHYDRATE, DEXTROAMPHETAMINE SULFATE AND AMPHETAMINE SULFATE 3.75; 3.75; 3.75; 3.75 MG/1; MG/1; MG/1; MG/1
15 CAPSULE, EXTENDED RELEASE ORAL DAILY
Qty: 30 CAPSULE | Refills: 0 | Status: SHIPPED | OUTPATIENT
Start: 2020-01-09 | End: 2020-02-17

## 2020-01-09 RX ORDER — DEXTROAMPHETAMINE SACCHARATE, AMPHETAMINE ASPARTATE, DEXTROAMPHETAMINE SULFATE AND AMPHETAMINE SULFATE 2.5; 2.5; 2.5; 2.5 MG/1; MG/1; MG/1; MG/1
10 TABLET ORAL DAILY
Qty: 30 TABLET | Refills: 0 | Status: SHIPPED | OUTPATIENT
Start: 2020-01-09 | End: 2020-02-17

## 2020-01-09 NOTE — TELEPHONE ENCOUNTER
Routing refill request to provider for review/approval because:  Drug not on the [facility] refill protocol     Colby Guido RN, BSN, PHN

## 2020-01-28 DIAGNOSIS — E03.4 HYPOTHYROIDISM DUE TO ACQUIRED ATROPHY OF THYROID: ICD-10-CM

## 2020-01-28 NOTE — TELEPHONE ENCOUNTER
"Requested Prescriptions   Pending Prescriptions Disp Refills     levothyroxine (SYNTHROID/LEVOTHROID) 125 MCG tablet [Pharmacy Med Name: LEVOTHYROXINE 0.125MG (125MCG) TAB]  ,Last Written Prescription Date:  1/23/19  Last Fill Quantity: 90 tablet,  # refills: 3   Last office visit: 8/19/2019 with prescribing provider:  Dr. Armstrong   Future Office Visit:     90 tablet 3     Sig: TAKE 1 TABLET(125 MCG) BY MOUTH DAILY       Thyroid Protocol Failed - 1/28/2020  2:38 PM        Failed - Normal TSH on file in past 12 months     Recent Labs   Lab Test 01/22/19  0820   TSH 2.36              Passed - Patient is 12 years or older        Passed - Recent (12 mo) or future (30 days) visit within the authorizing provider's specialty     Patient has had an office visit with the authorizing provider or a provider within the authorizing providers department within the previous 12 mos or has a future within next 30 days. See \"Patient Info\" tab in inbasket, or \"Choose Columns\" in Meds & Orders section of the refill encounter.              Passed - Medication is active on med list        Passed - No active pregnancy on record     If patient is pregnant or has had a positive pregnancy test, please check TSH.          Passed - No positive pregnancy test in past 12 months     If patient is pregnant or has had a positive pregnancy test, please check TSH.            "

## 2020-01-30 RX ORDER — LEVOTHYROXINE SODIUM 125 UG/1
TABLET ORAL
Qty: 30 TABLET | Refills: 0 | Status: SHIPPED | OUTPATIENT
Start: 2020-01-30 | End: 2020-03-11

## 2020-02-11 DIAGNOSIS — F43.22 ADJUSTMENT DISORDER WITH ANXIETY: ICD-10-CM

## 2020-02-11 NOTE — TELEPHONE ENCOUNTER
"Requested Prescriptions   Pending Prescriptions Disp Refills     FLUoxetine (PROZAC) 20 MG capsule [Pharmacy Med Name: FLUOXETINE 20MG CAPSULES]  Last Written Prescription Date:  8/19/19  Last Fill Quantity: 90 capsule,  # refills: 1   Last office visit: 8/19/2019 with prescribing provider:  Dr. Armstrong   Future Office Visit:     90 capsule 1     Sig: TAKE 1 CAPSULE(20 MG) BY MOUTH DAILY       SSRIs Protocol Passed - 2/11/2020  7:24 AM        Passed - Recent (12 mo) or future (30 days) visit within the authorizing provider's specialty     Patient has had an office visit with the authorizing provider or a provider within the authorizing providers department within the previous 12 mos or has a future within next 30 days. See \"Patient Info\" tab in inbasket, or \"Choose Columns\" in Meds & Orders section of the refill encounter.              Passed - Medication is active on med list        Passed - Patient is age 18 or older        Passed - No active pregnancy on record        Passed - No positive pregnancy test in last 12 months          "

## 2020-02-17 ENCOUNTER — MYC REFILL (OUTPATIENT)
Dept: FAMILY MEDICINE | Facility: CLINIC | Age: 55
End: 2020-02-17

## 2020-02-17 DIAGNOSIS — F98.8 ATTENTION DEFICIT DISORDER (ADD) WITHOUT HYPERACTIVITY: ICD-10-CM

## 2020-02-18 RX ORDER — DEXTROAMPHETAMINE SACCHARATE, AMPHETAMINE ASPARTATE, DEXTROAMPHETAMINE SULFATE AND AMPHETAMINE SULFATE 2.5; 2.5; 2.5; 2.5 MG/1; MG/1; MG/1; MG/1
10 TABLET ORAL DAILY
Qty: 30 TABLET | Refills: 0 | Status: SHIPPED | OUTPATIENT
Start: 2020-02-18 | End: 2020-03-30

## 2020-02-18 RX ORDER — DEXTROAMPHETAMINE SACCHARATE, AMPHETAMINE ASPARTATE MONOHYDRATE, DEXTROAMPHETAMINE SULFATE AND AMPHETAMINE SULFATE 3.75; 3.75; 3.75; 3.75 MG/1; MG/1; MG/1; MG/1
15 CAPSULE, EXTENDED RELEASE ORAL DAILY
Qty: 30 CAPSULE | Refills: 0 | Status: SHIPPED | OUTPATIENT
Start: 2020-02-18 | End: 2020-03-30

## 2020-02-18 NOTE — TELEPHONE ENCOUNTER
Requested Prescriptions   Pending Prescriptions Disp Refills     amphetamine-dextroamphetamine (ADDERALL XR) 15 MG 24 hr capsule 30 capsule 0     Sig: Take 1 capsule (15 mg) by mouth daily       There is no refill protocol information for this order        amphetamine-dextroamphetamine (ADDERALL) 10 MG tablet 30 tablet 0     Sig: Take 1 tablet (10 mg) by mouth daily By 3 pm if needed for evening activites       There is no refill protocol information for this order          amphetamine-dextroamphetamine (ADDERALL XR) 15 MG 24 hr capsule      Last Written Prescription Date:  1/9/2020  Last Fill Quantity: 30,   # refills: 0  Last Office Visit: 8/19/19  Future Office visit:    Next 5 appointments (look out 90 days)    Mar 11, 2020  7:00 AM NEVAT  [facility] with Esther Armstrong MD  [facility] ([facility]) [address] [place] [id number]  [phone]           Routing refill request to provider for review/approval because:  Drug not on the [facility], P or  Health refill protocol or controlled substance      amphetamine-dextroamphetamine (ADDERALL) 10 MG tablet      Last Written Prescription Date:  1/9/2020  Last Fill Quantity: 30,   # refills: 0  Last Office Visit: 8/19/19  Future Office visit:    Next 5 appointments (look out 90 days)    Mar 11, 2020  7:00 AM NEVAT  [facility] with Esther Armstrong MD  [facility] ([facility]) [address] [place] [id number]  [phone]           Routing refill request to provider for review/approval because:  Drug not on the [facility], [facility] or [facility] refill protocol or controlled substance\

## 2020-03-09 DIAGNOSIS — E03.4 HYPOTHYROIDISM DUE TO ACQUIRED ATROPHY OF THYROID: ICD-10-CM

## 2020-03-09 NOTE — TELEPHONE ENCOUNTER
"Requested Prescriptions   Pending Prescriptions Disp Refills     levothyroxine (SYNTHROID/LEVOTHROID) 125 MCG tablet [Pharmacy Med Name: LEVOTHYROXINE 0.125MG (125MCG) TAB] 30 tablet 0     Sig: TAKE 1 TABLET(125 MCG) BY MOUTH DAILY       Thyroid Protocol Failed - 3/9/2020 12:25 PM        Failed - Normal TSH on file in past 12 months     Recent Labs   Lab Test 01/22/19  0820   TSH 2.36              Passed - Patient is 12 years or older        Passed - Recent (12 mo) or future (30 days) visit within the authorizing provider's specialty     Patient has had an office visit with the authorizing provider or a provider within the authorizing providers department within the previous 12 mos or has a future within next 30 days. See \"Patient Info\" tab in inbasket, or \"Choose Columns\" in Meds & Orders section of the refill encounter.              Passed - Medication is active on med list        Passed - No active pregnancy on record     If patient is pregnant or has had a positive pregnancy test, please check TSH.          Passed - No positive pregnancy test in past 12 months     If patient is pregnant or has had a positive pregnancy test, please check TSH.             levothyroxine (SYNTHROID/LEVOTHROID) 125 MCG tablet  Last Written Prescription Date:  1/30/2020  Last Fill Quantity: 30,  # refills: 0   Last office visit: 8/19/2019 with prescribing provider:  Dr. Armstrong   Future Office Visit:   Next 5 appointments (look out 90 days)    Mar 25, 2020  7:20 AM CDT  [facility]  [facility] [address] [place] [id number] [phone]           "

## 2020-03-10 DIAGNOSIS — F43.22 ADJUSTMENT DISORDER WITH ANXIETY: ICD-10-CM

## 2020-03-10 NOTE — TELEPHONE ENCOUNTER
"Requested Prescriptions   Pending Prescriptions Disp Refills     FLUoxetine (PROZAC) 20 MG capsule [Pharmacy Med Name: FLUOXETINE 20MG CAPSULES]  Last Written Prescription Date:  2/13/20  Last Fill Quantity: 30 capsule,  # refills: 0   Last office visit: 8/19/2019 with prescribing provider:  Dr. Armstrong   Future Office Visit:   Next 5 appointments (look out 90 days)    Mar 25, 2020  7:20 AM CDT  MyChart Physical Adult with Esther Armstrong MD  [facility] ([address] [id number] [phone]          30 capsule 0     Sig: TAKE 1 CAPSULE BY MOUTH DAILY. NEED APPT FOR REFILLS.       SSRIs Protocol Passed - 3/10/2020  5:10 PM        Passed - Recent (12 mo) or future (30 days) visit within the authorizing provider's specialty     Patient has had an office visit with the authorizing provider or a provider within the authorizing providers department within the previous 12 mos or has a future within next 30 days. See \"Patient Info\" tab in inbasket, or \"Choose Columns\" in Meds & Orders section of the refill encounter.              Passed - Medication is active on med list        Passed - Patient is age 18 or older        Passed - No active pregnancy on record        Passed - No positive pregnancy test in last 12 months             "

## 2020-03-11 RX ORDER — LEVOTHYROXINE SODIUM 125 UG/1
TABLET ORAL
Qty: 30 TABLET | Refills: 0 | Status: SHIPPED | OUTPATIENT
Start: 2020-03-11 | End: 2020-04-01

## 2020-03-11 NOTE — TELEPHONE ENCOUNTER
Routing refill request to provider for review/approval because:  Labs not current:  TSH    Narda Conde RN

## 2020-03-12 NOTE — TELEPHONE ENCOUNTER
Routing refill request to provider for review/approval because:  Mildred given x1 and patient now scheduled for 3/25/20 (2nd mildred request)  Josi Salinas RN  [facility]

## 2020-03-15 ENCOUNTER — HEALTH MAINTENANCE LETTER (OUTPATIENT)
Age: 55
End: 2020-03-15

## 2020-03-24 ENCOUNTER — MYC REFILL (OUTPATIENT)
Dept: FAMILY MEDICINE | Facility: CLINIC | Age: 55
End: 2020-03-24

## 2020-03-24 DIAGNOSIS — F98.8 ATTENTION DEFICIT DISORDER (ADD) WITHOUT HYPERACTIVITY: ICD-10-CM

## 2020-03-24 RX ORDER — DEXTROAMPHETAMINE SACCHARATE, AMPHETAMINE ASPARTATE MONOHYDRATE, DEXTROAMPHETAMINE SULFATE AND AMPHETAMINE SULFATE 3.75; 3.75; 3.75; 3.75 MG/1; MG/1; MG/1; MG/1
15 CAPSULE, EXTENDED RELEASE ORAL DAILY
Qty: 30 CAPSULE | Refills: 0 | OUTPATIENT
Start: 2020-03-24

## 2020-03-24 NOTE — TELEPHONE ENCOUNTER
[employer] message responded to pt to schedule needed virtual visit for refill    JACIEL Humphreys.

## 2020-03-24 NOTE — TELEPHONE ENCOUNTER
Requested Prescriptions   Pending Prescriptions Disp Refills     amphetamine-dextroamphetamine (ADDERALL XR) 15 MG 24 hr capsule      Last Written Prescription Date:  2/18/20  Last Fill Quantity: 30 capsule,   # refills: 0  Last Office Visit: Dr. Armstrong  Future Office visit:    Next 5 appointments (look out 90 days)    Apr 01, 2020  7:40 AM CDT  Telephone Visit with Esther Armstrong MD  [facility]) [address] [place] [id number] [phone]           Routing refill request to provider for review/approval because:  Drug not on the [facility], [facility] or  Health refill protocol or controlled substance     30 capsule 0     Sig: Take 1 capsule (15 mg) by mouth daily       There is no refill protocol information for this order

## 2020-03-28 ENCOUNTER — MYC REFILL (OUTPATIENT)
Dept: FAMILY MEDICINE | Facility: CLINIC | Age: 55
End: 2020-03-28

## 2020-03-28 ENCOUNTER — MYC MEDICAL ADVICE (OUTPATIENT)
Dept: FAMILY MEDICINE | Facility: CLINIC | Age: 55
End: 2020-03-28

## 2020-03-28 DIAGNOSIS — F98.8 ATTENTION DEFICIT DISORDER (ADD) WITHOUT HYPERACTIVITY: ICD-10-CM

## 2020-03-28 RX ORDER — DEXTROAMPHETAMINE SACCHARATE, AMPHETAMINE ASPARTATE MONOHYDRATE, DEXTROAMPHETAMINE SULFATE AND AMPHETAMINE SULFATE 3.75; 3.75; 3.75; 3.75 MG/1; MG/1; MG/1; MG/1
15 CAPSULE, EXTENDED RELEASE ORAL DAILY
Qty: 30 CAPSULE | Refills: 0 | Status: CANCELLED | OUTPATIENT
Start: 2020-03-28

## 2020-03-28 RX ORDER — DEXTROAMPHETAMINE SACCHARATE, AMPHETAMINE ASPARTATE, DEXTROAMPHETAMINE SULFATE AND AMPHETAMINE SULFATE 2.5; 2.5; 2.5; 2.5 MG/1; MG/1; MG/1; MG/1
10 TABLET ORAL DAILY
Qty: 30 TABLET | Refills: 0 | Status: CANCELLED | OUTPATIENT
Start: 2020-03-28

## 2020-03-30 RX ORDER — DEXTROAMPHETAMINE SACCHARATE, AMPHETAMINE ASPARTATE MONOHYDRATE, DEXTROAMPHETAMINE SULFATE AND AMPHETAMINE SULFATE 3.75; 3.75; 3.75; 3.75 MG/1; MG/1; MG/1; MG/1
15 CAPSULE, EXTENDED RELEASE ORAL DAILY
Qty: 30 CAPSULE | Refills: 0 | OUTPATIENT
Start: 2020-03-30

## 2020-03-30 RX ORDER — DEXTROAMPHETAMINE SACCHARATE, AMPHETAMINE ASPARTATE, DEXTROAMPHETAMINE SULFATE AND AMPHETAMINE SULFATE 2.5; 2.5; 2.5; 2.5 MG/1; MG/1; MG/1; MG/1
10 TABLET ORAL DAILY
Qty: 30 TABLET | Refills: 0 | Status: SHIPPED | OUTPATIENT
Start: 2020-03-30 | End: 2020-06-15

## 2020-03-30 RX ORDER — DEXTROAMPHETAMINE SACCHARATE, AMPHETAMINE ASPARTATE, DEXTROAMPHETAMINE SULFATE AND AMPHETAMINE SULFATE 2.5; 2.5; 2.5; 2.5 MG/1; MG/1; MG/1; MG/1
10 TABLET ORAL DAILY
Qty: 30 TABLET | Refills: 0 | OUTPATIENT
Start: 2020-03-30

## 2020-03-30 RX ORDER — DEXTROAMPHETAMINE SACCHARATE, AMPHETAMINE ASPARTATE MONOHYDRATE, DEXTROAMPHETAMINE SULFATE AND AMPHETAMINE SULFATE 3.75; 3.75; 3.75; 3.75 MG/1; MG/1; MG/1; MG/1
15 CAPSULE, EXTENDED RELEASE ORAL DAILY
Qty: 30 CAPSULE | Refills: 0 | Status: SHIPPED | OUTPATIENT
Start: 2020-03-30 | End: 2020-05-03

## 2020-03-30 NOTE — TELEPHONE ENCOUNTER
Requested Prescriptions   Pending Prescriptions Disp Refills     amphetamine-dextroamphetamine (ADDERALL XR) 15 MG 24 hr capsule 30 capsule 0     Sig: Take 1 capsule (15 mg) by mouth daily       There is no refill protocol information for this order        amphetamine-dextroamphetamine (ADDERALL) 10 MG tablet 30 tablet 0     Sig: Take 1 tablet (10 mg) by mouth daily By 3 pm if needed for evening activites       There is no refill protocol information for this order        Routing refill request to provider for review/approval because:  Drug not on the [facility] refill protocol     Colby Guido RN, BSN, PHN

## 2020-04-01 ENCOUNTER — VIRTUAL VISIT (OUTPATIENT)
Dept: FAMILY MEDICINE | Facility: CLINIC | Age: 55
End: 2020-04-01
Payer: COMMERCIAL

## 2020-04-01 DIAGNOSIS — F43.22 ADJUSTMENT DISORDER WITH ANXIETY: ICD-10-CM

## 2020-04-01 DIAGNOSIS — E03.4 HYPOTHYROIDISM DUE TO ACQUIRED ATROPHY OF THYROID: ICD-10-CM

## 2020-04-01 PROCEDURE — 99214 OFFICE O/P EST MOD 30 MIN: CPT | Mod: TEL | Performed by: FAMILY MEDICINE

## 2020-04-01 RX ORDER — LEVOTHYROXINE SODIUM 125 UG/1
125 TABLET ORAL DAILY
Qty: 90 TABLET | Refills: 0 | Status: SHIPPED | OUTPATIENT
Start: 2020-04-01 | End: 2020-07-02

## 2020-04-01 ASSESSMENT — ANXIETY QUESTIONNAIRES
6. BECOMING EASILY ANNOYED OR IRRITABLE: MORE THAN HALF THE DAYS
7. FEELING AFRAID AS IF SOMETHING AWFUL MIGHT HAPPEN: NOT AT ALL
IF YOU CHECKED OFF ANY PROBLEMS ON THIS QUESTIONNAIRE, HOW DIFFICULT HAVE THESE PROBLEMS MADE IT FOR YOU TO DO YOUR WORK, TAKE CARE OF THINGS AT HOME, OR GET ALONG WITH OTHER PEOPLE: SOMEWHAT DIFFICULT
3. WORRYING TOO MUCH ABOUT DIFFERENT THINGS: SEVERAL DAYS
2. NOT BEING ABLE TO STOP OR CONTROL WORRYING: SEVERAL DAYS
GAD7 TOTAL SCORE: 9
5. BEING SO RESTLESS THAT IT IS HARD TO SIT STILL: MORE THAN HALF THE DAYS
1. FEELING NERVOUS, ANXIOUS, OR ON EDGE: MORE THAN HALF THE DAYS

## 2020-04-01 ASSESSMENT — PATIENT HEALTH QUESTIONNAIRE - PHQ9
SUM OF ALL RESPONSES TO PHQ QUESTIONS 1-9: 5
5. POOR APPETITE OR OVEREATING: SEVERAL DAYS

## 2020-04-01 NOTE — PROGRESS NOTES
"Subjective     Brittany Rooney is a 54 year old female who is being evaluated via a billable telephone visit.      The patient has been notified of following:     Due to COVID-19 pandemic, virtual visit performed for the following:    \"This telephone visit will be conducted via a call between you and your physician/provider. We have found that certain health care needs can be provided without the need for a physical exam.  This service lets us provide the care you need with a short phone conversation.  If a prescription is necessary we can send it directly to your pharmacy.  If lab work is needed we can place an order for that and you can then stop by our lab to have the test done at a later time.    If during the course of the call the physician/provider feels a telephone visit is not appropriate, you will not be charged for this service.\"     Patient has given verbal consent for Telephone visit?  Yes    Brittany Rooney complains of No chief complaint on file.      ALLERGIES  Zoloft [sertraline hcl]; Lobster [crustaceans]; Penicillin v potassium; and Ultram [tramadol]           Depression and Anxiety Follow-Up    How are you doing with your depression since your last visit? Worsened slightly she attributes to the current pandemic    How are you doing with your anxiety since your last visit?  Worsened she attributes to the current pandemic    Are you having other symptoms that might be associated with depression or anxiety? No    Have you had a significant life event? No     Do you have any concerns with your use of alcohol or other drugs? No  Sleeping is disturbed - more restless.    Social History     Tobacco Use     Smoking status: Never Smoker     Smokeless tobacco: Never Used   Substance Use Topics     Alcohol use: Yes     Comment: INFREQUENTLY     Drug use: No     PHQ 6/21/2018 1/22/2019 4/1/2020   PHQ-9 Total Score 5 2 5   Q9: Thoughts of better off dead/self-harm past 2 weeks Not at all Not at all " Not at all     GALE-7 SCORE 6/21/2018 1/22/2019 4/1/2020   Total Score - - -   Total Score 7 7 9           Suicide Assessment Five-step Evaluation and Treatment (SAFE-T)    Hypothyroidism Follow-up      Since last visit, patient describes the following symptoms: Weight stable, no hair loss, no skin changes, no constipation, no loose stools      ADD  Working site and off site some.  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  Is going today and this causes some increased anxiety for her but her attention and focus are well controlled on the current Adderall.  She denies side effects.      BP Readings from Last 3 Encounters:   08/19/19 131/83   01/22/19 138/76   11/12/18 104/80    Wt Readings from Last 3 Encounters:   08/19/19 89.3 kg (196 lb 12.8 oz)   01/22/19 119.4 kg (263 lb 3.2 oz)   06/21/18 120.2 kg (265 lb)                    Reviewed and updated as needed this visit by Provider  Tobacco  Allergies  Meds  Problems  Med Hx  Surg Hx  Fam Hx         Review of Systems   ROS COMP: Constitutional, HEENT, cardiovascular, pulmonary, gi and gu systems are negative, except as otherwise noted.       Objective   Reported vitals:  There were no vitals taken for this visit.   healthy, alert and no distress  Psych: Alert and oriented times 3; coherent speech, normal   rate and volume, able to articulate logical thoughts, able   to abstract reason, no tangential thoughts, no hallucinations   or delusions  Her affect is mild anxiety     Diagnostic Test Results:  Labs reviewed in Epic        Assessment/Plan:  1. Hypothyroidism due to acquired atrophy of thyroid  Has been on his current medication for an extended period of time.  She will continue this and plan lab testing in the summer when she returns for her annual exam.  - levothyroxine (SYNTHROID/LEVOTHROID) 125 MCG tablet; Take 1 tablet (125 mcg) by mouth daily  Dispense: 90 tablet; Refill: 0    2.  Adjustment disorder with anxiety  Symptoms adequately controlled currently.  Mild increase in anxiety related to situational factors.  Continue current treatment and follow-up planned in 3 to 4 months with annual exam.  - FLUoxetine (PROZAC) 20 MG capsule; Take 1 capsule (20 mg) by mouth daily  Dispense: 90 capsule; Refill: 0    3.  ADD  Continue on her current Adderall.  Refill was sent earlier this week.  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      Return in about 3 months (around 7/1/2020) for Physical Exam, Lab Work, chronic health problems.    Patient Instructions   Continue current medication.  If side effects, or symptoms of worsening depressionn anxiety, or symptoms you would attribute to your thyroid occur lab, testing will be planned.  Otherwise we will wait you are able to return to the clinic for her annual exam and do lab testing at that time.    Send a Scout message monthly for Adderall refill as previous.        Phone call duration:  9 minutes    Esther Armstrong MD    [phone]      This chart was documented by provider using a voice activated software called Dragon in addition to manual typing. There may be vocabulary errors or other grammatical errors due to this.

## 2020-04-01 NOTE — PATIENT INSTRUCTIONS
Continue current medication.  If side effects, or symptoms of worsening depressionn anxiety, or symptoms you would attribute to your thyroid occur lab, testing will be planned.  Otherwise we will wait you are able to return to the clinic for her annual exam and do lab testing at that time.    Send a Scrip-t message monthly for Adderall refill as previous.

## 2020-04-02 ASSESSMENT — ANXIETY QUESTIONNAIRES: GAD7 TOTAL SCORE: 9

## 2020-05-03 ENCOUNTER — E-VISIT (OUTPATIENT)
Dept: FAMILY MEDICINE | Facility: CLINIC | Age: 55
End: 2020-05-03
Payer: COMMERCIAL

## 2020-05-03 ENCOUNTER — MYC REFILL (OUTPATIENT)
Dept: FAMILY MEDICINE | Facility: CLINIC | Age: 55
End: 2020-05-03

## 2020-05-03 DIAGNOSIS — F98.8 ATTENTION DEFICIT DISORDER (ADD) WITHOUT HYPERACTIVITY: ICD-10-CM

## 2020-05-03 DIAGNOSIS — B37.31 YEAST INFECTION OF THE VAGINA: Primary | ICD-10-CM

## 2020-05-03 PROCEDURE — 99421 OL DIG E/M SVC 5-10 MIN: CPT | Performed by: FAMILY MEDICINE

## 2020-05-04 RX ORDER — FLUCONAZOLE 150 MG/1
150 TABLET ORAL ONCE
Qty: 1 TABLET | Refills: 0 | Status: SHIPPED | OUTPATIENT
Start: 2020-05-04 | End: 2020-05-04

## 2020-05-04 NOTE — PATIENT INSTRUCTIONS
Yeast Infection (Candida Vaginal Infection)    You have a Candida vaginal infection. This is also known as a yeast infection. It is most often caused by a type of yeast (fungus) called Candida. Candida are normally found in the vagina. But if they increase in number, this can lead to infection and cause symptoms.  Symptoms of a yeast infection can include:    Clumpy or thin, white discharge, which may look like cottage cheese    Itching or burning    Burning with urination  Certain factors can make a yeast infection more likely. These can include:    Taking certain medicines, such as antibiotics or birth control pills    Pregnancy    Diabetes    Weak immune system  A yeast infection is most often treated with antifungal medicine. This may be given as a vaginal cream or pills you take by mouth. Treatment may last for about 1 to 7 days. Women with severe or recurrent infections may need longer courses of treatment.  Home care    If you re prescribed medicine, be sure to use it as directed. Finish all of the medicine, even if your symptoms go away. Note: Don t try to treat yourself using over-the-counter products without talking to your provider first. He or she will let you know if this is a good option for you.    Ask your provider what steps you can take to help reduce your risk of having a yeast infection in the future.  Follow-up care  Follow up with your healthcare provider, or as directed.  When to seek medical advice  Call your healthcare provider right away if:    You have a fever of 100.4 F (38 C) or higher, or as directed by your provider.    Your symptoms worsen, or they don t go away within a few days of starting treatment.    You have new pain in the lower belly or pelvic region.    You have side effects that bother you or a reaction to the cream or pills you re prescribed.    You or any partners you have sex with have new symptoms, such as a rash, joint pain, or sores.  Date Last Reviewed:  10/1/2017    9081-4939 [employer]. [address] [place] [address], [place] [address]. All rights reserved. This information is not intended as a substitute for professional medical care. Always follow your healthcare professional's instructions.          Preventing Vaginitis     Use mild, unscented soap when you bathe or shower to avoid irritating your vagina.    Vaginitis is irritation or infection of the vagina or vulva (the outside opening of the vagina). Vaginitis can be caused by bacteria, viruses, parasites, or yeast. Chemicals (such as in perfumes or soaps or in spermicides) can sometimes be a cause. Vaginitis can be caused by hormone changes in pregnancy or with menopause. You can help prevent vaginitis. Follow the tips below. And see your healthcare provider if you have any symptoms.  Hygiene    Avoid chemicals. Do not use vaginal sprays. Do not use scented toilet paper or tampons that are scented. Sprays and scents have chemicals that can irritate your vagina.    Do not douche unless you are told to by your healthcare provider. Douching is rarely needed. And it upsets the normal balance in the vagina.    Wash yourself well. Wash the outer vaginal area (vulva) every day with mild, unscented soap. Keep it as dry as possible.    Wipe correctly. Make sure to wipe from front to back after a bowel movement. This helps keep from spreading bacteria from your anus to your vagina.    Change your tampon often. During your period, make sure to change your tampon as often as directed on the package. This allows the normal flow of vaginal discharge and blood.  Lifestyle    Limit your number of sexual partners. The more partners you have, the greater your risk of infection. Using condoms helps reduce your risk.    Get enough sleep. Sleep helps keep your body s immune system healthy. This helps you fight infection.    Lose weight, if needed. Excess weight can reduce air circulation around your vagina. This can  increase your risk of infection.    Exercise regularly. Regular activity helps keep your body healthy.    Take antibiotics only as directed. Antibiotics can change the normal chemical balance in the vagina.    Clothing    Don t sit in wet clothes. Yeast thrives when it s warm and damp.    Don t wear tight pants. And don t wear tights, leggings, or hose without a cotton crotch. These types of clothing trap warmth and moisture.    Wear cotton underwear. Cotton lets air circulate around the vagina.  Symptoms of vaginitis    Irritation, swelling, or itching of the genital area    Vaginal discharge    Bad vaginal odor    Pain or burning during urination   Date Last Reviewed: 12/1/2016 2000-2019 [employer]. [address] [place] [address], [place] [address]. All rights reserved. This information is not intended as a substitute for professional medical care. Always follow your healthcare professional's instructions.

## 2020-05-06 RX ORDER — DEXTROAMPHETAMINE SACCHARATE, AMPHETAMINE ASPARTATE MONOHYDRATE, DEXTROAMPHETAMINE SULFATE AND AMPHETAMINE SULFATE 3.75; 3.75; 3.75; 3.75 MG/1; MG/1; MG/1; MG/1
15 CAPSULE, EXTENDED RELEASE ORAL DAILY
Qty: 30 CAPSULE | Refills: 0 | Status: SHIPPED | OUTPATIENT
Start: 2020-05-06 | End: 2020-06-15

## 2020-05-06 NOTE — TELEPHONE ENCOUNTER
Routing refill request to provider for review/approval because:  Drug not on the [facility] refill protocol     Libertad James RN, [facility] Triage

## 2020-06-15 ENCOUNTER — TELEPHONE (OUTPATIENT)
Dept: FAMILY MEDICINE | Facility: CLINIC | Age: 55
End: 2020-06-15

## 2020-06-15 ENCOUNTER — MYC REFILL (OUTPATIENT)
Dept: FAMILY MEDICINE | Facility: CLINIC | Age: 55
End: 2020-06-15

## 2020-06-15 DIAGNOSIS — F98.8 ATTENTION DEFICIT DISORDER (ADD) WITHOUT HYPERACTIVITY: ICD-10-CM

## 2020-06-15 NOTE — TELEPHONE ENCOUNTER
Panel Management Review   One phone call and send letter if unable to reach them or [employer] message and send letter if not read after 2 weeks (You will get a message to your inbasket)      BP Readings from Last 1 Encounters:   08/19/19 131/83        Health Maintenance Due   Topic Date Due     ZOSTER IMMUNIZATION (1 of 2) 08/29/2017     MAMMO SCREENING  11/14/2019     PREVENTIVE CARE VISIT  01/22/2020     TSH W/FREE T4 REFLEX  01/22/2020        Fail List measure: Mammogram        Patient is due/failing the following:   MAMMOGRAM    Action needed:   Mammogram    Type of outreach:    Phone, left message for patient to call back.     Questions for provider review:    None                                                                                       Chart routed to SAW Smith

## 2020-06-17 RX ORDER — DEXTROAMPHETAMINE SACCHARATE, AMPHETAMINE ASPARTATE MONOHYDRATE, DEXTROAMPHETAMINE SULFATE AND AMPHETAMINE SULFATE 3.75; 3.75; 3.75; 3.75 MG/1; MG/1; MG/1; MG/1
15 CAPSULE, EXTENDED RELEASE ORAL DAILY
Qty: 30 CAPSULE | Refills: 0 | Status: SHIPPED | OUTPATIENT
Start: 2020-06-17 | End: 2020-07-26

## 2020-06-17 RX ORDER — DEXTROAMPHETAMINE SACCHARATE, AMPHETAMINE ASPARTATE, DEXTROAMPHETAMINE SULFATE AND AMPHETAMINE SULFATE 2.5; 2.5; 2.5; 2.5 MG/1; MG/1; MG/1; MG/1
10 TABLET ORAL DAILY
Qty: 30 TABLET | Refills: 0 | Status: SHIPPED | OUTPATIENT
Start: 2020-06-17 | End: 2020-07-26

## 2020-06-30 DIAGNOSIS — E03.4 HYPOTHYROIDISM DUE TO ACQUIRED ATROPHY OF THYROID: ICD-10-CM

## 2020-06-30 DIAGNOSIS — F43.22 ADJUSTMENT DISORDER WITH ANXIETY: ICD-10-CM

## 2020-07-02 RX ORDER — LEVOTHYROXINE SODIUM 125 UG/1
125 TABLET ORAL DAILY
Qty: 90 TABLET | Refills: 0 | Status: SHIPPED | OUTPATIENT
Start: 2020-07-02 | End: 2020-10-14

## 2020-07-02 NOTE — TELEPHONE ENCOUNTER
Routing refill request to provider for review/approval because:  Labs not current:  TSH    Other medication filled per protocol.    Libertad James RN, [facility] Triage

## 2020-07-26 ENCOUNTER — MYC REFILL (OUTPATIENT)
Dept: FAMILY MEDICINE | Facility: CLINIC | Age: 55
End: 2020-07-26

## 2020-07-26 DIAGNOSIS — F98.8 ATTENTION DEFICIT DISORDER (ADD) WITHOUT HYPERACTIVITY: ICD-10-CM

## 2020-07-27 RX ORDER — DEXTROAMPHETAMINE SACCHARATE, AMPHETAMINE ASPARTATE, DEXTROAMPHETAMINE SULFATE AND AMPHETAMINE SULFATE 2.5; 2.5; 2.5; 2.5 MG/1; MG/1; MG/1; MG/1
10 TABLET ORAL DAILY
Qty: 30 TABLET | Refills: 0 | Status: SHIPPED | OUTPATIENT
Start: 2020-07-27 | End: 2020-08-28

## 2020-07-27 RX ORDER — DEXTROAMPHETAMINE SACCHARATE, AMPHETAMINE ASPARTATE, DEXTROAMPHETAMINE SULFATE AND AMPHETAMINE SULFATE 2.5; 2.5; 2.5; 2.5 MG/1; MG/1; MG/1; MG/1
10 TABLET ORAL DAILY
Qty: 30 TABLET | Refills: 0 | OUTPATIENT
Start: 2020-07-27

## 2020-07-27 RX ORDER — DEXTROAMPHETAMINE SACCHARATE, AMPHETAMINE ASPARTATE MONOHYDRATE, DEXTROAMPHETAMINE SULFATE AND AMPHETAMINE SULFATE 3.75; 3.75; 3.75; 3.75 MG/1; MG/1; MG/1; MG/1
15 CAPSULE, EXTENDED RELEASE ORAL DAILY
Qty: 30 CAPSULE | Refills: 0 | Status: SHIPPED | OUTPATIENT
Start: 2020-07-27 | End: 2020-08-28

## 2020-07-27 NOTE — TELEPHONE ENCOUNTER
Requested Prescriptions   Pending Prescriptions Disp Refills     amphetamine-dextroamphetamine (ADDERALL) 10 MG tablet 30 tablet 0     Sig: Take 1 tablet (10 mg) by mouth daily By 3 pm if needed for evening activites       There is no refill protocol information for this order        amphetamine-dextroamphetamine (ADDERALL XR) 15 MG 24 hr capsule 30 capsule 0     Sig: Take 1 capsule (15 mg) by mouth daily       There is no refill protocol information for this order        Routing refill request to provider for review/approval because:  Drug not on the [facility] refill protocol     Colby Guido RN, BSN, PHN

## 2020-08-12 ENCOUNTER — APPOINTMENT (OUTPATIENT)
Dept: URBAN - METROPOLITAN AREA CLINIC 252 | Age: 55
Setting detail: DERMATOLOGY
End: 2020-08-12

## 2020-08-12 DIAGNOSIS — Z41.9 ENCOUNTER FOR PROCEDURE FOR PURPOSES OTHER THAN REMEDYING HEALTH STATE, UNSPECIFIED: ICD-10-CM

## 2020-08-12 PROCEDURE — OTHER BOTOX (U OR CC): OTHER

## 2020-08-12 NOTE — PROCEDURE: BOTOX (U OR CC)
Price (Use Numbers Only, No Special Characters Or $): 453 Price (Use Numbers Only, No Special Characters Or $): 409

## 2020-08-12 NOTE — HPI: COSMETIC (BOTOX)
Additional History: Patient has been very happy with previous treatments. Reports that she did get some bruising in the nasal labial fold area after filler last summer, but other than that has not had any side effects with Botox.

## 2020-08-28 ENCOUNTER — MYC REFILL (OUTPATIENT)
Dept: FAMILY MEDICINE | Facility: CLINIC | Age: 55
End: 2020-08-28

## 2020-08-28 DIAGNOSIS — F98.8 ATTENTION DEFICIT DISORDER (ADD) WITHOUT HYPERACTIVITY: ICD-10-CM

## 2020-08-28 RX ORDER — DEXTROAMPHETAMINE SACCHARATE, AMPHETAMINE ASPARTATE, DEXTROAMPHETAMINE SULFATE AND AMPHETAMINE SULFATE 2.5; 2.5; 2.5; 2.5 MG/1; MG/1; MG/1; MG/1
10 TABLET ORAL DAILY
Qty: 30 TABLET | Refills: 0 | Status: SHIPPED | OUTPATIENT
Start: 2020-08-28 | End: 2020-10-01

## 2020-08-28 RX ORDER — DEXTROAMPHETAMINE SACCHARATE, AMPHETAMINE ASPARTATE MONOHYDRATE, DEXTROAMPHETAMINE SULFATE AND AMPHETAMINE SULFATE 3.75; 3.75; 3.75; 3.75 MG/1; MG/1; MG/1; MG/1
15 CAPSULE, EXTENDED RELEASE ORAL DAILY
Qty: 30 CAPSULE | Refills: 0 | Status: SHIPPED | OUTPATIENT
Start: 2020-08-28 | End: 2020-10-01

## 2020-08-28 NOTE — TELEPHONE ENCOUNTER
Requested Prescriptions   Pending Prescriptions Disp Refills     amphetamine-dextroamphetamine (ADDERALL) 10 MG tablet 30 tablet 0     Sig: Take 1 tablet (10 mg) by mouth daily By 3 pm if needed for evening activites       There is no refill protocol information for this order        amphetamine-dextroamphetamine (ADDERALL XR) 15 MG 24 hr capsule 30 capsule 0     Sig: Take 1 capsule (15 mg) by mouth daily       There is no refill protocol information for this order        Routing refill request to provider for review/approval because:  Drug not on the [facility] refill protocol     Colby Guido RN, BSN, PHN

## 2020-10-01 ENCOUNTER — MYC REFILL (OUTPATIENT)
Dept: FAMILY MEDICINE | Facility: CLINIC | Age: 55
End: 2020-10-01

## 2020-10-01 DIAGNOSIS — F98.8 ATTENTION DEFICIT DISORDER (ADD) WITHOUT HYPERACTIVITY: ICD-10-CM

## 2020-10-02 RX ORDER — DEXTROAMPHETAMINE SACCHARATE, AMPHETAMINE ASPARTATE, DEXTROAMPHETAMINE SULFATE AND AMPHETAMINE SULFATE 2.5; 2.5; 2.5; 2.5 MG/1; MG/1; MG/1; MG/1
10 TABLET ORAL DAILY
Qty: 30 TABLET | Refills: 0 | Status: SHIPPED | OUTPATIENT
Start: 2020-10-02 | End: 2020-11-02

## 2020-10-02 RX ORDER — DEXTROAMPHETAMINE SACCHARATE, AMPHETAMINE ASPARTATE MONOHYDRATE, DEXTROAMPHETAMINE SULFATE AND AMPHETAMINE SULFATE 3.75; 3.75; 3.75; 3.75 MG/1; MG/1; MG/1; MG/1
15 CAPSULE, EXTENDED RELEASE ORAL DAILY
Qty: 30 CAPSULE | Refills: 0 | Status: SHIPPED | OUTPATIENT
Start: 2020-10-02 | End: 2020-11-02

## 2020-10-02 NOTE — TELEPHONE ENCOUNTER
Requested Prescriptions   Pending Prescriptions Disp Refills     amphetamine-dextroamphetamine (ADDERALL) 10 MG tablet 30 tablet 0     Sig: Take 1 tablet (10 mg) by mouth daily By 3 pm if needed for evening activites       There is no refill protocol information for this order        amphetamine-dextroamphetamine (ADDERALL XR) 15 MG 24 hr capsule 30 capsule 0     Sig: Take 1 capsule (15 mg) by mouth daily       There is no refill protocol information for this order        Routing refill request to provider for review/approval because:  Drug not on the [facility] refill protocol     Colby Guido RN, BSN, PHN

## 2020-10-10 DIAGNOSIS — E03.4 HYPOTHYROIDISM DUE TO ACQUIRED ATROPHY OF THYROID: ICD-10-CM

## 2020-10-10 DIAGNOSIS — F43.22 ADJUSTMENT DISORDER WITH ANXIETY: ICD-10-CM

## 2020-10-13 NOTE — TELEPHONE ENCOUNTER
Prescription for Fluoxetine approved per [facility] [place] Refill Protocol.    Routing refill request for Levothyroxine to provider for review/approval because:  Labs not current:  TSH  Last drawn in Jan 2019    Augusta Bynum RN  [facility]

## 2020-10-14 RX ORDER — LEVOTHYROXINE SODIUM 125 UG/1
125 TABLET ORAL DAILY
Qty: 30 TABLET | Refills: 0 | Status: SHIPPED | OUTPATIENT
Start: 2020-10-14 | End: 2020-11-17

## 2020-11-02 ENCOUNTER — MYC REFILL (OUTPATIENT)
Dept: FAMILY MEDICINE | Facility: CLINIC | Age: 55
End: 2020-11-02

## 2020-11-02 DIAGNOSIS — E03.4 HYPOTHYROIDISM DUE TO ACQUIRED ATROPHY OF THYROID: ICD-10-CM

## 2020-11-02 DIAGNOSIS — F98.8 ATTENTION DEFICIT DISORDER (ADD) WITHOUT HYPERACTIVITY: ICD-10-CM

## 2020-11-05 RX ORDER — DEXTROAMPHETAMINE SACCHARATE, AMPHETAMINE ASPARTATE MONOHYDRATE, DEXTROAMPHETAMINE SULFATE AND AMPHETAMINE SULFATE 3.75; 3.75; 3.75; 3.75 MG/1; MG/1; MG/1; MG/1
15 CAPSULE, EXTENDED RELEASE ORAL DAILY
Qty: 30 CAPSULE | Refills: 0 | Status: SHIPPED | OUTPATIENT
Start: 2020-11-05 | End: 2020-12-14

## 2020-11-05 RX ORDER — LEVOTHYROXINE SODIUM 125 UG/1
125 TABLET ORAL DAILY
Qty: 30 TABLET | Refills: 0 | OUTPATIENT
Start: 2020-11-05

## 2020-11-05 RX ORDER — DEXTROAMPHETAMINE SACCHARATE, AMPHETAMINE ASPARTATE, DEXTROAMPHETAMINE SULFATE AND AMPHETAMINE SULFATE 2.5; 2.5; 2.5; 2.5 MG/1; MG/1; MG/1; MG/1
10 TABLET ORAL DAILY
Qty: 30 TABLET | Refills: 0 | Status: SHIPPED | OUTPATIENT
Start: 2020-11-05 | End: 2020-12-14

## 2020-11-05 NOTE — TELEPHONE ENCOUNTER
Routing refill request to provider for review/approval because:  Drug not on the [facility] refill protocol   Labs not current:  TSH    Libertad James RN, [facility] Triage

## 2020-11-13 DIAGNOSIS — E03.4 HYPOTHYROIDISM DUE TO ACQUIRED ATROPHY OF THYROID: ICD-10-CM

## 2020-11-13 RX ORDER — LEVOTHYROXINE SODIUM 125 UG/1
125 TABLET ORAL DAILY
Qty: 30 TABLET | Refills: 0 | Status: CANCELLED | OUTPATIENT
Start: 2020-11-13

## 2020-11-17 RX ORDER — LEVOTHYROXINE SODIUM 125 UG/1
125 TABLET ORAL DAILY
Qty: 30 TABLET | Refills: 0 | Status: SHIPPED | OUTPATIENT
Start: 2020-11-17 | End: 2020-12-14

## 2020-12-14 ENCOUNTER — MYC REFILL (OUTPATIENT)
Dept: FAMILY MEDICINE | Facility: CLINIC | Age: 55
End: 2020-12-14

## 2020-12-14 DIAGNOSIS — F98.8 ATTENTION DEFICIT DISORDER (ADD) WITHOUT HYPERACTIVITY: ICD-10-CM

## 2020-12-14 DIAGNOSIS — E03.4 HYPOTHYROIDISM DUE TO ACQUIRED ATROPHY OF THYROID: ICD-10-CM

## 2020-12-16 RX ORDER — DEXTROAMPHETAMINE SACCHARATE, AMPHETAMINE ASPARTATE, DEXTROAMPHETAMINE SULFATE AND AMPHETAMINE SULFATE 2.5; 2.5; 2.5; 2.5 MG/1; MG/1; MG/1; MG/1
10 TABLET ORAL DAILY
Qty: 30 TABLET | Refills: 0 | Status: SHIPPED | OUTPATIENT
Start: 2020-12-16 | End: 2021-01-19

## 2020-12-16 RX ORDER — LEVOTHYROXINE SODIUM 125 UG/1
125 TABLET ORAL DAILY
Qty: 30 TABLET | Refills: 0 | Status: SHIPPED | OUTPATIENT
Start: 2020-12-16 | End: 2021-01-19

## 2020-12-16 RX ORDER — DEXTROAMPHETAMINE SACCHARATE, AMPHETAMINE ASPARTATE MONOHYDRATE, DEXTROAMPHETAMINE SULFATE AND AMPHETAMINE SULFATE 3.75; 3.75; 3.75; 3.75 MG/1; MG/1; MG/1; MG/1
15 CAPSULE, EXTENDED RELEASE ORAL DAILY
Qty: 30 CAPSULE | Refills: 0 | Status: SHIPPED | OUTPATIENT
Start: 2020-12-16 | End: 2021-01-19

## 2020-12-16 NOTE — TELEPHONE ENCOUNTER
Routing refill request to provider for review/approval because:  Drug not on the [facility] refill protocol   Labs not current:  TSH    Libertad James RN, [facility] Triage

## 2021-01-14 ENCOUNTER — HEALTH MAINTENANCE LETTER (OUTPATIENT)
Age: 56
End: 2021-01-14

## 2021-01-19 ENCOUNTER — MYC REFILL (OUTPATIENT)
Dept: FAMILY MEDICINE | Facility: CLINIC | Age: 56
End: 2021-01-19

## 2021-01-19 DIAGNOSIS — F98.8 ATTENTION DEFICIT DISORDER (ADD) WITHOUT HYPERACTIVITY: ICD-10-CM

## 2021-01-19 DIAGNOSIS — F43.22 ADJUSTMENT DISORDER WITH ANXIETY: ICD-10-CM

## 2021-01-19 DIAGNOSIS — E03.4 HYPOTHYROIDISM DUE TO ACQUIRED ATROPHY OF THYROID: ICD-10-CM

## 2021-01-19 RX ORDER — LEVOTHYROXINE SODIUM 125 UG/1
125 TABLET ORAL DAILY
Qty: 30 TABLET | Refills: 0 | Status: SHIPPED | OUTPATIENT
Start: 2021-01-19 | End: 2021-02-19

## 2021-01-19 RX ORDER — DEXTROAMPHETAMINE SACCHARATE, AMPHETAMINE ASPARTATE MONOHYDRATE, DEXTROAMPHETAMINE SULFATE AND AMPHETAMINE SULFATE 3.75; 3.75; 3.75; 3.75 MG/1; MG/1; MG/1; MG/1
15 CAPSULE, EXTENDED RELEASE ORAL DAILY
Qty: 30 CAPSULE | Refills: 0 | Status: SHIPPED | OUTPATIENT
Start: 2021-01-19 | End: 2021-02-23

## 2021-01-19 RX ORDER — DEXTROAMPHETAMINE SACCHARATE, AMPHETAMINE ASPARTATE, DEXTROAMPHETAMINE SULFATE AND AMPHETAMINE SULFATE 2.5; 2.5; 2.5; 2.5 MG/1; MG/1; MG/1; MG/1
10 TABLET ORAL DAILY
Qty: 30 TABLET | Refills: 0 | Status: SHIPPED | OUTPATIENT
Start: 2021-01-19 | End: 2021-02-23

## 2021-01-19 NOTE — TELEPHONE ENCOUNTER
Routing refill request to provider for review/approval because:  Drug not on the [facility] refill protocol   taylor Dsouza BSN, RN

## 2021-01-26 DIAGNOSIS — E03.4 HYPOTHYROIDISM DUE TO ACQUIRED ATROPHY OF THYROID: ICD-10-CM

## 2021-01-26 LAB — TSH SERPL DL<=0.005 MIU/L-ACNC: 2.56 MU/L (ref 0.4–4)

## 2021-01-26 PROCEDURE — 84443 ASSAY THYROID STIM HORMONE: CPT | Performed by: FAMILY MEDICINE

## 2021-01-26 PROCEDURE — 36415 COLL VENOUS BLD VENIPUNCTURE: CPT | Performed by: FAMILY MEDICINE

## 2021-01-29 NOTE — RESULT ENCOUNTER NOTE
Your thyroid testing indicates you are on the correct dose of thyroid medication.  I look forward to seeing you in about 10 days,  ANGK

## 2021-02-23 ENCOUNTER — MYC REFILL (OUTPATIENT)
Dept: FAMILY MEDICINE | Facility: CLINIC | Age: 56
End: 2021-02-23

## 2021-02-23 DIAGNOSIS — F98.8 ATTENTION DEFICIT DISORDER (ADD) WITHOUT HYPERACTIVITY: ICD-10-CM

## 2021-02-23 NOTE — TELEPHONE ENCOUNTER
Routing refill request to provider for review/approval because:  Drug not on the [facility] refill protocol     Laura POLANCON, RN

## 2021-02-24 RX ORDER — DEXTROAMPHETAMINE SACCHARATE, AMPHETAMINE ASPARTATE MONOHYDRATE, DEXTROAMPHETAMINE SULFATE AND AMPHETAMINE SULFATE 3.75; 3.75; 3.75; 3.75 MG/1; MG/1; MG/1; MG/1
15 CAPSULE, EXTENDED RELEASE ORAL DAILY
Qty: 30 CAPSULE | Refills: 0 | Status: SHIPPED | OUTPATIENT
Start: 2021-02-24 | End: 2021-04-02

## 2021-02-24 RX ORDER — DEXTROAMPHETAMINE SACCHARATE, AMPHETAMINE ASPARTATE, DEXTROAMPHETAMINE SULFATE AND AMPHETAMINE SULFATE 2.5; 2.5; 2.5; 2.5 MG/1; MG/1; MG/1; MG/1
10 TABLET ORAL DAILY
Qty: 30 TABLET | Refills: 0 | Status: SHIPPED | OUTPATIENT
Start: 2021-02-24 | End: 2021-04-02

## 2021-03-10 ENCOUNTER — OFFICE VISIT (OUTPATIENT)
Dept: FAMILY MEDICINE | Facility: CLINIC | Age: 56
End: 2021-03-10
Payer: COMMERCIAL

## 2021-03-10 VITALS
HEART RATE: 68 BPM | HEIGHT: 66 IN | RESPIRATION RATE: 12 BRPM | WEIGHT: 171.4 LBS | BODY MASS INDEX: 27.55 KG/M2 | OXYGEN SATURATION: 96 % | DIASTOLIC BLOOD PRESSURE: 84 MMHG | SYSTOLIC BLOOD PRESSURE: 132 MMHG | TEMPERATURE: 98.8 F

## 2021-03-10 DIAGNOSIS — N92.0 MENORRHAGIA WITH REGULAR CYCLE: ICD-10-CM

## 2021-03-10 DIAGNOSIS — F43.22 ADJUSTMENT DISORDER WITH ANXIETY: ICD-10-CM

## 2021-03-10 DIAGNOSIS — Z13.1 SCREENING FOR DIABETES MELLITUS: ICD-10-CM

## 2021-03-10 DIAGNOSIS — F98.8 ATTENTION DEFICIT DISORDER (ADD) WITHOUT HYPERACTIVITY: ICD-10-CM

## 2021-03-10 DIAGNOSIS — E78.5 HYPERLIPIDEMIA LDL GOAL <130: ICD-10-CM

## 2021-03-10 DIAGNOSIS — Z12.31 ENCOUNTER FOR SCREENING MAMMOGRAM FOR BREAST CANCER: ICD-10-CM

## 2021-03-10 DIAGNOSIS — Z00.00 ROUTINE GENERAL MEDICAL EXAMINATION AT A HEALTH CARE FACILITY: Primary | ICD-10-CM

## 2021-03-10 PROBLEM — E66.01 MORBID OBESITY DUE TO EXCESS CALORIES (H): Status: RESOLVED | Noted: 2017-03-10 | Resolved: 2021-03-10

## 2021-03-10 LAB
ALBUMIN SERPL-MCNC: 3.8 G/DL (ref 3.4–5)
ALP SERPL-CCNC: 51 U/L (ref 40–150)
ALT SERPL W P-5'-P-CCNC: 31 U/L (ref 0–50)
ANION GAP SERPL CALCULATED.3IONS-SCNC: 4 MMOL/L (ref 3–14)
AST SERPL W P-5'-P-CCNC: 14 U/L (ref 0–45)
BILIRUB SERPL-MCNC: 0.5 MG/DL (ref 0.2–1.3)
BUN SERPL-MCNC: 19 MG/DL (ref 7–30)
CALCIUM SERPL-MCNC: 9.5 MG/DL (ref 8.5–10.1)
CHLORIDE SERPL-SCNC: 110 MMOL/L (ref 94–109)
CHOLEST SERPL-MCNC: 190 MG/DL
CO2 SERPL-SCNC: 26 MMOL/L (ref 20–32)
CREAT SERPL-MCNC: 0.61 MG/DL (ref 0.52–1.04)
ERYTHROCYTE [DISTWIDTH] IN BLOOD BY AUTOMATED COUNT: 12.7 % (ref 10–15)
GFR SERPL CREATININE-BSD FRML MDRD: >90 ML/MIN/{1.73_M2}
GLUCOSE SERPL-MCNC: 101 MG/DL (ref 70–99)
HCT VFR BLD AUTO: 39.4 % (ref 35–47)
HDLC SERPL-MCNC: 73 MG/DL
HGB BLD-MCNC: 13 G/DL (ref 11.7–15.7)
LDLC SERPL CALC-MCNC: 101 MG/DL
MCH RBC QN AUTO: 30.5 PG (ref 26.5–33)
MCHC RBC AUTO-ENTMCNC: 33 G/DL (ref 31.5–36.5)
MCV RBC AUTO: 93 FL (ref 78–100)
NONHDLC SERPL-MCNC: 117 MG/DL
PLATELET # BLD AUTO: 247 10E9/L (ref 150–450)
POTASSIUM SERPL-SCNC: 4.1 MMOL/L (ref 3.4–5.3)
PROT SERPL-MCNC: 7.1 G/DL (ref 6.8–8.8)
RBC # BLD AUTO: 4.26 10E12/L (ref 3.8–5.2)
SODIUM SERPL-SCNC: 140 MMOL/L (ref 133–144)
TRIGL SERPL-MCNC: 81 MG/DL
WBC # BLD AUTO: 7.7 10E9/L (ref 4–11)

## 2021-03-10 PROCEDURE — 36415 COLL VENOUS BLD VENIPUNCTURE: CPT | Performed by: FAMILY MEDICINE

## 2021-03-10 PROCEDURE — 99396 PREV VISIT EST AGE 40-64: CPT | Performed by: FAMILY MEDICINE

## 2021-03-10 PROCEDURE — 99213 OFFICE O/P EST LOW 20 MIN: CPT | Mod: 25 | Performed by: FAMILY MEDICINE

## 2021-03-10 PROCEDURE — 85027 COMPLETE CBC AUTOMATED: CPT | Performed by: FAMILY MEDICINE

## 2021-03-10 PROCEDURE — 80053 COMPREHEN METABOLIC PANEL: CPT | Performed by: FAMILY MEDICINE

## 2021-03-10 PROCEDURE — 80061 LIPID PANEL: CPT | Performed by: FAMILY MEDICINE

## 2021-03-10 RX ORDER — FLUOXETINE 40 MG/1
40 CAPSULE ORAL DAILY
Qty: 30 CAPSULE | Refills: 1 | Status: SHIPPED | OUTPATIENT
Start: 2021-03-10 | End: 2021-06-02

## 2021-03-10 ASSESSMENT — ANXIETY QUESTIONNAIRES
5. BEING SO RESTLESS THAT IT IS HARD TO SIT STILL: MORE THAN HALF THE DAYS
7. FEELING AFRAID AS IF SOMETHING AWFUL MIGHT HAPPEN: SEVERAL DAYS
GAD7 TOTAL SCORE: 18
6. BECOMING EASILY ANNOYED OR IRRITABLE: NEARLY EVERY DAY
3. WORRYING TOO MUCH ABOUT DIFFERENT THINGS: NEARLY EVERY DAY
1. FEELING NERVOUS, ANXIOUS, OR ON EDGE: NEARLY EVERY DAY
IF YOU CHECKED OFF ANY PROBLEMS ON THIS QUESTIONNAIRE, HOW DIFFICULT HAVE THESE PROBLEMS MADE IT FOR YOU TO DO YOUR WORK, TAKE CARE OF THINGS AT HOME, OR GET ALONG WITH OTHER PEOPLE: SOMEWHAT DIFFICULT
2. NOT BEING ABLE TO STOP OR CONTROL WORRYING: NEARLY EVERY DAY

## 2021-03-10 ASSESSMENT — MIFFLIN-ST. JEOR: SCORE: 1403.19

## 2021-03-10 ASSESSMENT — PATIENT HEALTH QUESTIONNAIRE - PHQ9
5. POOR APPETITE OR OVEREATING: NEARLY EVERY DAY
SUM OF ALL RESPONSES TO PHQ QUESTIONS 1-9: 10

## 2021-03-10 NOTE — RESULT ENCOUNTER NOTE
Your blood sugar is listed as elevated but this is normal since you were not fasting for the testing.  Your kidney and liver testing are normal.   Your cholesterol is under good control.  Triglyceride level is normal even though you were not fasting.  Blood cell counts are normal.    Please call or MyChart message me if you have any questions.    PSK
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
## 2021-03-10 NOTE — PATIENT INSTRUCTIONS
Labs today    I will make recommendations for next step in the workup and treatment for the heavy periods when results return.      After you are 2 weeks past the last COVID vaccine, I would recommend tetanus vaccine and shingles vaccine.    Mammogram recommended.   You can call [phone] to schedule this at the [employer] building in [place]     Increase the fluoxetine to 40 mg daily.  Update me in 4-6 weeks with response - if symptoms not controlled follow up visit recommended.          Preventive Health Recommendations  Female Ages 50 - 64    Yearly exam: See your health care provider every year in order to  o Review health changes.   o Discuss preventive care.    o Review your medicines if your doctor has prescribed any.      Get a Pap test every three years (unless you have an abnormal result and your provider advises testing more often).    If you get Pap tests with HPV test, you only need to test every 5 years, unless you have an abnormal result.     You do not need a Pap test if your uterus was removed (hysterectomy) and you have not had cancer.    You should be tested each year for STDs (sexually transmitted diseases) if you're at risk.     Have a mammogram every 1 to 2 years.    Have a colonoscopy at age 50, or have a yearly FIT test (stool test). These exams screen for colon cancer.      Have a cholesterol test every 5 years, or more often if advised.    Have a diabetes test (fasting glucose) every three years. If you are at risk for diabetes, you should have this test more often.     If you are at risk for osteoporosis (brittle bone disease), think about having a bone density scan (DEXA).    Shots: Get a flu shot each year. Get a tetanus shot every 10 years.    Nutrition:     Eat at least 5 servings of fruits and vegetables each day.    Eat whole-grain bread, whole-wheat pasta and brown rice instead of white grains and rice.    Get adequate Calcium and Vitamin D.     Lifestyle    Exercise at least  150 minutes a week (30 minutes a day, 5 days a week). This will help you control your weight and prevent disease.    Limit alcohol to one drink per day.    No smoking.     Wear sunscreen to prevent skin cancer.     See your dentist every six months for an exam and cleaning.    See your eye doctor every 1 to 2 years.

## 2021-03-11 ASSESSMENT — ANXIETY QUESTIONNAIRES: GAD7 TOTAL SCORE: 18

## 2021-04-02 ENCOUNTER — MYC REFILL (OUTPATIENT)
Dept: FAMILY MEDICINE | Facility: CLINIC | Age: 56
End: 2021-04-02

## 2021-04-02 DIAGNOSIS — F98.8 ATTENTION DEFICIT DISORDER (ADD) WITHOUT HYPERACTIVITY: ICD-10-CM

## 2021-04-02 NOTE — TELEPHONE ENCOUNTER
Routing refill request to provider for review/approval because:  Drug not on the [facility] refill protocol     Libertad POLANCON, RN

## 2021-04-04 RX ORDER — DEXTROAMPHETAMINE SACCHARATE, AMPHETAMINE ASPARTATE, DEXTROAMPHETAMINE SULFATE AND AMPHETAMINE SULFATE 2.5; 2.5; 2.5; 2.5 MG/1; MG/1; MG/1; MG/1
10 TABLET ORAL DAILY
Qty: 30 TABLET | Refills: 0 | Status: SHIPPED | OUTPATIENT
Start: 2021-04-04 | End: 2021-04-27

## 2021-04-04 RX ORDER — DEXTROAMPHETAMINE SACCHARATE, AMPHETAMINE ASPARTATE MONOHYDRATE, DEXTROAMPHETAMINE SULFATE AND AMPHETAMINE SULFATE 3.75; 3.75; 3.75; 3.75 MG/1; MG/1; MG/1; MG/1
15 CAPSULE, EXTENDED RELEASE ORAL DAILY
Qty: 30 CAPSULE | Refills: 0 | Status: SHIPPED | OUTPATIENT
Start: 2021-04-04 | End: 2021-04-27

## 2021-04-27 ENCOUNTER — MYC REFILL (OUTPATIENT)
Dept: FAMILY MEDICINE | Facility: CLINIC | Age: 56
End: 2021-04-27

## 2021-04-27 DIAGNOSIS — F98.8 ATTENTION DEFICIT DISORDER (ADD) WITHOUT HYPERACTIVITY: ICD-10-CM

## 2021-04-28 RX ORDER — DEXTROAMPHETAMINE SACCHARATE, AMPHETAMINE ASPARTATE, DEXTROAMPHETAMINE SULFATE AND AMPHETAMINE SULFATE 2.5; 2.5; 2.5; 2.5 MG/1; MG/1; MG/1; MG/1
10 TABLET ORAL DAILY
Qty: 30 TABLET | Refills: 0 | Status: SHIPPED | OUTPATIENT
Start: 2021-04-28 | End: 2021-06-01

## 2021-04-28 RX ORDER — DEXTROAMPHETAMINE SACCHARATE, AMPHETAMINE ASPARTATE MONOHYDRATE, DEXTROAMPHETAMINE SULFATE AND AMPHETAMINE SULFATE 3.75; 3.75; 3.75; 3.75 MG/1; MG/1; MG/1; MG/1
15 CAPSULE, EXTENDED RELEASE ORAL DAILY
Qty: 30 CAPSULE | Refills: 0 | Status: SHIPPED | OUTPATIENT
Start: 2021-04-28 | End: 2021-06-01

## 2021-04-28 NOTE — TELEPHONE ENCOUNTER
Routing refill request to provider for review/approval because:  Drug not on the [facility] refill protocol     Augusta Bynum RN  [facility]

## 2021-05-08 ENCOUNTER — HEALTH MAINTENANCE LETTER (OUTPATIENT)
Age: 56
End: 2021-05-08

## 2021-05-27 ENCOUNTER — TELEPHONE (OUTPATIENT)
Dept: FAMILY MEDICINE | Facility: CLINIC | Age: 56
End: 2021-05-27

## 2021-05-27 NOTE — TELEPHONE ENCOUNTER
Patient Quality Outreach Summary      Summary:    Patient is due/failing the following:   Breast Cancer Screening - Mammogram    Type of outreach:    Sent [employer] message.    Questions for provider review:    None                                                                                                                    Sandra Dumont

## 2021-05-31 DIAGNOSIS — F43.22 ADJUSTMENT DISORDER WITH ANXIETY: ICD-10-CM

## 2021-05-31 DIAGNOSIS — L30.9 ECZEMA OF BOTH HANDS: ICD-10-CM

## 2021-06-01 ENCOUNTER — MYC REFILL (OUTPATIENT)
Dept: FAMILY MEDICINE | Facility: CLINIC | Age: 56
End: 2021-06-01

## 2021-06-01 DIAGNOSIS — L30.9 ECZEMA OF BOTH HANDS: ICD-10-CM

## 2021-06-01 DIAGNOSIS — F98.8 ATTENTION DEFICIT DISORDER (ADD) WITHOUT HYPERACTIVITY: ICD-10-CM

## 2021-06-01 RX ORDER — CLOBETASOL PROPIONATE 0.5 MG/G
OINTMENT TOPICAL 2 TIMES DAILY
Qty: 30 G | Refills: 0 | Status: CANCELLED | OUTPATIENT
Start: 2021-06-01

## 2021-06-02 RX ORDER — FLUOXETINE 40 MG/1
CAPSULE ORAL
Qty: 90 CAPSULE | Refills: 0 | Status: SHIPPED | OUTPATIENT
Start: 2021-06-02 | End: 2021-10-11

## 2021-06-02 RX ORDER — DEXTROAMPHETAMINE SACCHARATE, AMPHETAMINE ASPARTATE MONOHYDRATE, DEXTROAMPHETAMINE SULFATE AND AMPHETAMINE SULFATE 3.75; 3.75; 3.75; 3.75 MG/1; MG/1; MG/1; MG/1
15 CAPSULE, EXTENDED RELEASE ORAL DAILY
Qty: 30 CAPSULE | Refills: 0 | Status: SHIPPED | OUTPATIENT
Start: 2021-06-02 | End: 2021-07-01

## 2021-06-02 RX ORDER — DEXTROAMPHETAMINE SACCHARATE, AMPHETAMINE ASPARTATE, DEXTROAMPHETAMINE SULFATE AND AMPHETAMINE SULFATE 2.5; 2.5; 2.5; 2.5 MG/1; MG/1; MG/1; MG/1
10 TABLET ORAL DAILY
Qty: 30 TABLET | Refills: 0 | Status: SHIPPED | OUTPATIENT
Start: 2021-06-02 | End: 2021-07-01

## 2021-06-02 RX ORDER — CLOBETASOL PROPIONATE 0.5 MG/G
OINTMENT TOPICAL
Qty: 30 G | Refills: 0 | Status: SHIPPED | OUTPATIENT
Start: 2021-06-02 | End: 2021-11-16

## 2021-06-02 NOTE — TELEPHONE ENCOUNTER
adderall refill requests  Per med list both last prescribed on 4/28/21 for 30 days  Last OV: 3/10/21 Dr. Armstrong  Routing refill request to provider for review/approval because:  Drug not on the [facility] refill protocol

## 2021-06-02 NOTE — TELEPHONE ENCOUNTER
Routing refill request to provider for review/approval because:  Drug not on the [facility] refill protocol   Pt had dose adjustment at last ov for fluoxetine.  Rylee Hernandez BSN, RN

## 2021-06-03 ENCOUNTER — TRANSFERRED RECORDS (OUTPATIENT)
Dept: HEALTH INFORMATION MANAGEMENT | Facility: CLINIC | Age: 56
End: 2021-06-03

## 2021-06-14 NOTE — TELEPHONE ENCOUNTER
Impression: Refractive amblyopia, left eye: H53.022. Plan: Patient educated to reason for long standing decreased vision. Will prescribe corrective lenses to help improve the patient's visual potential. Ed pt that full time wear of spectacles necessary for best outcome. Will monitor for improvement at future appointment. Order in place for lab testing.  Refill sent x 1 month with instructions to follow up for lab.  ANGK

## 2021-06-14 NOTE — TELEPHONE ENCOUNTER
Rx mailed to [facility].      Gina STEVENSON, Patient Care      Finasteride Pregnancy And Lactation Text: This medication is absolutely contraindicated during pregnancy. It is unknown if it is excreted in breast milk.

## 2021-07-01 ENCOUNTER — MYC REFILL (OUTPATIENT)
Dept: FAMILY MEDICINE | Facility: CLINIC | Age: 56
End: 2021-07-01

## 2021-07-01 ENCOUNTER — E-VISIT (OUTPATIENT)
Dept: FAMILY MEDICINE | Facility: CLINIC | Age: 56
End: 2021-07-01
Payer: COMMERCIAL

## 2021-07-01 DIAGNOSIS — Z20.2 EXPOSURE TO CHLAMYDIA: Primary | ICD-10-CM

## 2021-07-01 DIAGNOSIS — F43.22 ADJUSTMENT DISORDER WITH ANXIETY: ICD-10-CM

## 2021-07-01 DIAGNOSIS — N89.8 VAGINAL DISCHARGE: ICD-10-CM

## 2021-07-01 DIAGNOSIS — F98.8 ATTENTION DEFICIT DISORDER (ADD) WITHOUT HYPERACTIVITY: ICD-10-CM

## 2021-07-01 PROCEDURE — 99421 OL DIG E/M SVC 5-10 MIN: CPT | Performed by: FAMILY MEDICINE

## 2021-07-01 RX ORDER — AZITHROMYCIN 500 MG/1
1000 TABLET, FILM COATED ORAL DAILY
Qty: 2 TABLET | Refills: 0 | Status: SHIPPED | OUTPATIENT
Start: 2021-07-01 | End: 2021-07-02

## 2021-07-01 RX ORDER — FLUOXETINE 40 MG/1
CAPSULE ORAL
Qty: 90 CAPSULE | Refills: 0 | OUTPATIENT
Start: 2021-07-01

## 2021-07-01 RX ORDER — DEXTROAMPHETAMINE SACCHARATE, AMPHETAMINE ASPARTATE, DEXTROAMPHETAMINE SULFATE AND AMPHETAMINE SULFATE 2.5; 2.5; 2.5; 2.5 MG/1; MG/1; MG/1; MG/1
10 TABLET ORAL DAILY
Qty: 30 TABLET | Refills: 0 | Status: SHIPPED | OUTPATIENT
Start: 2021-07-01 | End: 2021-08-04

## 2021-07-01 RX ORDER — DEXTROAMPHETAMINE SACCHARATE, AMPHETAMINE ASPARTATE MONOHYDRATE, DEXTROAMPHETAMINE SULFATE AND AMPHETAMINE SULFATE 3.75; 3.75; 3.75; 3.75 MG/1; MG/1; MG/1; MG/1
15 CAPSULE, EXTENDED RELEASE ORAL DAILY
Qty: 30 CAPSULE | Refills: 0 | Status: SHIPPED | OUTPATIENT
Start: 2021-07-01 | End: 2021-08-04

## 2021-07-01 NOTE — TELEPHONE ENCOUNTER
Routing refill request to provider for review/approval because:  Drug not on the [facility] refill protocol   Rylee Hernandez BSN, RN

## 2021-07-07 ENCOUNTER — MYC REFILL (OUTPATIENT)
Dept: FAMILY MEDICINE | Facility: CLINIC | Age: 56
End: 2021-07-07

## 2021-07-07 ENCOUNTER — E-VISIT (OUTPATIENT)
Dept: FAMILY MEDICINE | Facility: CLINIC | Age: 56
End: 2021-07-07
Payer: COMMERCIAL

## 2021-07-07 DIAGNOSIS — F43.22 ADJUSTMENT DISORDER WITH ANXIETY: ICD-10-CM

## 2021-07-07 DIAGNOSIS — Z20.2 EXPOSURE TO SEXUALLY TRANSMITTED DISEASE (STD): ICD-10-CM

## 2021-07-07 DIAGNOSIS — A59.01 VAGINAL TRICHOMONIASIS: ICD-10-CM

## 2021-07-07 DIAGNOSIS — Z20.2 EXPOSURE TO TRICHOMONAS: Primary | ICD-10-CM

## 2021-07-07 DIAGNOSIS — N89.8 VAGINAL ODOR: ICD-10-CM

## 2021-07-07 PROCEDURE — 99421 OL DIG E/M SVC 5-10 MIN: CPT | Performed by: FAMILY MEDICINE

## 2021-07-07 RX ORDER — METRONIDAZOLE 500 MG/1
500 TABLET ORAL 2 TIMES DAILY
Qty: 14 TABLET | Refills: 0 | Status: SHIPPED | OUTPATIENT
Start: 2021-07-07 | End: 2021-07-14

## 2021-07-07 RX ORDER — FLUOXETINE 40 MG/1
CAPSULE ORAL
Qty: 90 CAPSULE | Refills: 0 | Status: CANCELLED | OUTPATIENT
Start: 2021-07-07

## 2021-07-07 NOTE — PATIENT INSTRUCTIONS
Trichomonas Vaginal Infection (Trichomoniasis)     Trichomonas vaginal infection is often called  trich.  It is caused by a parasite that is passed during sex. This makes trich a sexually transmitted infection (STI). Both men and women can get trich, but it is more common in women.   Most people who have trich don t have any symptoms at first. If symptoms do occur, they may take weeks or months to develop.   Symptoms in women can include:    Thin discharge from the vagina that may smell bad and be clear, white, gray, green, or yellow in color    Itching, burning, redness, or soreness in or around the vagina    Pain in the lower belly    Frequent urination or pain and burning during urination    Pain during sex  Symptoms in men are not very common. Men may have trich and pass it to women during sex without knowing they were ever infected.   Trich is most often treated with antibiotics. Without treatment, trich can increase the risk of more serious health problems such as:     Pelvic inflammatory disease (PID)     delivery (giving birth to a baby early if you re pregnant)    HIV and certain other STIs  Home care    Take the antibiotics you re prescribed exactly as directed. Finish  all of the medicine, even if your symptoms go away.    Don't drink alcohol until you re done with your treatment.    Tell any partners you have sex with that you have trich. They will need to be tested for trich and possibly treated as well.    Don't have sex until 7 to 10 days after you and any partners you have sex with are confirmed to have been treated.    Prevention  The only way to prevent getting trich or any other STI is to not have sex. If you choose to have sex, then take steps to lower your health risks:     Use condoms when having sex.    Limit the number of partners you have sex with.    Get tested regularly for STIs. Ask any partner you have sex with to do the same.    Don t have sex with anyone who has symptoms  that may be due to an STI.  Follow-up care  Follow up with your healthcare provider, or as advised. Testing will likely be done to ensure that the infection has cleared.   When to get medical advice  Call your healthcare provider right away if:    You have a fever of 100.4 F (38 C) or higher, or as directed by your provider.    Your symptoms get worse, or they don t go away even after completing your treatment.    You have new pain in the lower belly or pelvic region.    You have side effects that bother you or a reaction to the medicine you re taking.    You or any partners you have sex with have new symptoms, such as a rash, joint pain, or sores.  [employer] last reviewed this educational content on 6/1/2020 2000-2021 [employer]. All rights reserved. This information is not intended as a substitute for professional medical care. Always follow your healthcare professional's instructions.

## 2021-08-04 ENCOUNTER — MYC REFILL (OUTPATIENT)
Dept: FAMILY MEDICINE | Facility: CLINIC | Age: 56
End: 2021-08-04

## 2021-08-04 DIAGNOSIS — F98.8 ATTENTION DEFICIT DISORDER (ADD) WITHOUT HYPERACTIVITY: ICD-10-CM

## 2021-08-05 RX ORDER — DEXTROAMPHETAMINE SACCHARATE, AMPHETAMINE ASPARTATE, DEXTROAMPHETAMINE SULFATE AND AMPHETAMINE SULFATE 2.5; 2.5; 2.5; 2.5 MG/1; MG/1; MG/1; MG/1
10 TABLET ORAL DAILY
Qty: 30 TABLET | Refills: 0 | Status: SHIPPED | OUTPATIENT
Start: 2021-08-05 | End: 2021-09-01

## 2021-08-05 RX ORDER — DEXTROAMPHETAMINE SACCHARATE, AMPHETAMINE ASPARTATE MONOHYDRATE, DEXTROAMPHETAMINE SULFATE AND AMPHETAMINE SULFATE 3.75; 3.75; 3.75; 3.75 MG/1; MG/1; MG/1; MG/1
15 CAPSULE, EXTENDED RELEASE ORAL DAILY
Qty: 30 CAPSULE | Refills: 0 | Status: SHIPPED | OUTPATIENT
Start: 2021-08-05 | End: 2021-09-01

## 2021-09-01 ENCOUNTER — MYC REFILL (OUTPATIENT)
Dept: FAMILY MEDICINE | Facility: CLINIC | Age: 56
End: 2021-09-01

## 2021-09-01 DIAGNOSIS — F98.8 ATTENTION DEFICIT DISORDER (ADD) WITHOUT HYPERACTIVITY: ICD-10-CM

## 2021-09-02 RX ORDER — DEXTROAMPHETAMINE SACCHARATE, AMPHETAMINE ASPARTATE, DEXTROAMPHETAMINE SULFATE AND AMPHETAMINE SULFATE 2.5; 2.5; 2.5; 2.5 MG/1; MG/1; MG/1; MG/1
10 TABLET ORAL DAILY
Qty: 30 TABLET | Refills: 0 | Status: SHIPPED | OUTPATIENT
Start: 2021-09-02 | End: 2021-10-11

## 2021-09-02 RX ORDER — DEXTROAMPHETAMINE SACCHARATE, AMPHETAMINE ASPARTATE MONOHYDRATE, DEXTROAMPHETAMINE SULFATE AND AMPHETAMINE SULFATE 3.75; 3.75; 3.75; 3.75 MG/1; MG/1; MG/1; MG/1
15 CAPSULE, EXTENDED RELEASE ORAL DAILY
Qty: 30 CAPSULE | Refills: 0 | Status: SHIPPED | OUTPATIENT
Start: 2021-09-02 | End: 2021-10-11

## 2021-10-11 ENCOUNTER — MYC REFILL (OUTPATIENT)
Dept: FAMILY MEDICINE | Facility: CLINIC | Age: 56
End: 2021-10-11

## 2021-10-11 DIAGNOSIS — F43.22 ADJUSTMENT DISORDER WITH ANXIETY: ICD-10-CM

## 2021-10-11 DIAGNOSIS — F98.8 ATTENTION DEFICIT DISORDER (ADD) WITHOUT HYPERACTIVITY: ICD-10-CM

## 2021-10-13 RX ORDER — DEXTROAMPHETAMINE SACCHARATE, AMPHETAMINE ASPARTATE, DEXTROAMPHETAMINE SULFATE AND AMPHETAMINE SULFATE 2.5; 2.5; 2.5; 2.5 MG/1; MG/1; MG/1; MG/1
10 TABLET ORAL DAILY
Qty: 15 TABLET | Refills: 0 | Status: SHIPPED | OUTPATIENT
Start: 2021-10-13 | End: 2021-12-10

## 2021-10-13 RX ORDER — FLUOXETINE 40 MG/1
40 CAPSULE ORAL DAILY
Qty: 30 CAPSULE | Refills: 0 | Status: SHIPPED | OUTPATIENT
Start: 2021-10-13 | End: 2021-11-10

## 2021-10-13 RX ORDER — DEXTROAMPHETAMINE SACCHARATE, AMPHETAMINE ASPARTATE MONOHYDRATE, DEXTROAMPHETAMINE SULFATE AND AMPHETAMINE SULFATE 3.75; 3.75; 3.75; 3.75 MG/1; MG/1; MG/1; MG/1
15 CAPSULE, EXTENDED RELEASE ORAL DAILY
Qty: 15 CAPSULE | Refills: 0 | Status: SHIPPED | OUTPATIENT
Start: 2021-10-13 | End: 2021-12-10

## 2021-10-13 NOTE — TELEPHONE ENCOUNTER
Did have an appointment scheduled but no showed this last month.  2-week supply is given for her Adderall to get through to when an appointment can be completed.    Refill fluoxetine x 1 to be updated at an upcoming appointment    PHQ 1/22/2019 4/1/2020 3/10/2021   PHQ-9 Total Score 2 5 10   Q9: Thoughts of better off dead/self-harm past 2 weeks Not at all Not at all Not at all     GALE-7 SCORE 1/22/2019 4/1/2020 3/10/2021   Total Score - - -   Total Score 7 9 18

## 2021-10-23 ENCOUNTER — HEALTH MAINTENANCE LETTER (OUTPATIENT)
Age: 56
End: 2021-10-23

## 2021-11-09 DIAGNOSIS — F43.22 ADJUSTMENT DISORDER WITH ANXIETY: ICD-10-CM

## 2021-11-10 RX ORDER — FLUOXETINE 40 MG/1
CAPSULE ORAL
Qty: 30 CAPSULE | Refills: 0 | Status: SHIPPED | OUTPATIENT
Start: 2021-11-10 | End: 2021-12-13

## 2021-11-16 ENCOUNTER — MYC REFILL (OUTPATIENT)
Dept: FAMILY MEDICINE | Facility: CLINIC | Age: 56
End: 2021-11-16
Payer: COMMERCIAL

## 2021-11-16 DIAGNOSIS — L30.9 ECZEMA OF BOTH HANDS: ICD-10-CM

## 2021-11-16 DIAGNOSIS — F98.8 ATTENTION DEFICIT DISORDER (ADD) WITHOUT HYPERACTIVITY: ICD-10-CM

## 2021-11-16 RX ORDER — CLOBETASOL PROPIONATE 0.5 MG/G
OINTMENT TOPICAL
Qty: 30 G | Refills: 0 | Status: SHIPPED | OUTPATIENT
Start: 2021-11-16 | End: 2022-02-02

## 2021-11-16 RX ORDER — DEXTROAMPHETAMINE SACCHARATE, AMPHETAMINE ASPARTATE, DEXTROAMPHETAMINE SULFATE AND AMPHETAMINE SULFATE 2.5; 2.5; 2.5; 2.5 MG/1; MG/1; MG/1; MG/1
10 TABLET ORAL DAILY
Qty: 15 TABLET | Refills: 0 | Status: CANCELLED | OUTPATIENT
Start: 2021-11-16

## 2021-11-16 RX ORDER — DEXTROAMPHETAMINE SACCHARATE, AMPHETAMINE ASPARTATE MONOHYDRATE, DEXTROAMPHETAMINE SULFATE AND AMPHETAMINE SULFATE 3.75; 3.75; 3.75; 3.75 MG/1; MG/1; MG/1; MG/1
15 CAPSULE, EXTENDED RELEASE ORAL DAILY
Qty: 15 CAPSULE | Refills: 0 | Status: CANCELLED | OUTPATIENT
Start: 2021-11-16

## 2021-11-16 NOTE — TELEPHONE ENCOUNTER
Is overdue for cliic visit for these meds. Looks like cancelled and then no showed a few visits in Sept  Needs to schedule visit for these meds  Refill declined for the adderall until seen. Virtual visit is fine.

## 2021-11-19 NOTE — TELEPHONE ENCOUNTER
Left message for patient to return call, if she does, per Dr. Weaver, she is overdue for a clinic visit in order to receive the prescription she was denied

## 2021-11-23 NOTE — PROCEDURE: BOTOX (U OR CC)
Follow-up with your family doctor in 1 to 2 days to discuss further treatment options.  You should also follow-up regarding the renal cyst that we found on your CT scan today.  Return to the emergency department if you have a sudden loss of strength in one leg, inability to control your bowel or bladder.   Dilution (U/0.1 Cc): 10

## 2021-12-10 ENCOUNTER — MYC REFILL (OUTPATIENT)
Dept: FAMILY MEDICINE | Facility: CLINIC | Age: 56
End: 2021-12-10
Payer: COMMERCIAL

## 2021-12-10 DIAGNOSIS — F98.8 ATTENTION DEFICIT DISORDER (ADD) WITHOUT HYPERACTIVITY: ICD-10-CM

## 2021-12-13 RX ORDER — DEXTROAMPHETAMINE SACCHARATE, AMPHETAMINE ASPARTATE MONOHYDRATE, DEXTROAMPHETAMINE SULFATE AND AMPHETAMINE SULFATE 3.75; 3.75; 3.75; 3.75 MG/1; MG/1; MG/1; MG/1
15 CAPSULE, EXTENDED RELEASE ORAL DAILY
Qty: 30 CAPSULE | Refills: 0 | Status: SHIPPED | OUTPATIENT
Start: 2021-12-13 | End: 2022-02-02

## 2021-12-13 RX ORDER — DEXTROAMPHETAMINE SACCHARATE, AMPHETAMINE ASPARTATE, DEXTROAMPHETAMINE SULFATE AND AMPHETAMINE SULFATE 2.5; 2.5; 2.5; 2.5 MG/1; MG/1; MG/1; MG/1
10 TABLET ORAL DAILY
Qty: 30 TABLET | Refills: 0 | Status: SHIPPED | OUTPATIENT
Start: 2021-12-13 | End: 2022-02-02

## 2021-12-31 NOTE — TELEPHONE ENCOUNTER
"Requested Prescriptions   Pending Prescriptions Disp Refills     levothyroxine (SYNTHROID/LEVOTHROID) 125 MCG tablet [Pharmacy Med Name: LEVOTHYROXINE 0.125MG (125MCG) TAB] 30 tablet 0     Sig: TAKE 1 TABLET(125 MCG) BY MOUTH DAILY    Thyroid Protocol Failed    11/24/2018  3:28 PM       Failed - Normal TSH on file in past 12 months    Recent Labs   Lab Test  10/10/17   0745   TSH  1.51             Passed - Patient is 12 years or older       Passed - Recent (12 mo) or future (30 days) visit within the authorizing provider's specialty    Patient had office visit in the last 12 months or has a visit in the next 30 days with authorizing provider or within the authorizing provider's specialty.  See \"Patient Info\" tab in inbasket, or \"Choose Columns\" in Meds & Orders section of the refill encounter.             Passed - No active pregnancy on record    If patient is pregnant or has had a positive pregnancy test, please check TSH.         Passed - No positive pregnancy test in past 12 months    If patient is pregnant or has had a positive pregnancy test, please check TSH.          levothyroxine (SYNTHROID/LEVOTHROID) 125 MCG tablet  Last Written Prescription Date:  10/26/18  Last Fill Quantity: 30,  # refills: 0   Last office visit: 6/21/2018 with prescribing provider:  Dr. Weaver   Future Office Visit:      " Med: Tri-lo-sprintec  LOV with Teresa Mast, NP: 06/09/21  LOV Ortho-Tri-Cyclen. Called pt's mother Vero. She will look into this and give our clinic a call back on Monday. Will wait for her call back before proceeding with refills.

## 2022-02-02 ENCOUNTER — MYC REFILL (OUTPATIENT)
Dept: FAMILY MEDICINE | Facility: CLINIC | Age: 57
End: 2022-02-02
Payer: COMMERCIAL

## 2022-02-02 DIAGNOSIS — L30.9 ECZEMA OF BOTH HANDS: ICD-10-CM

## 2022-02-02 DIAGNOSIS — F98.8 ATTENTION DEFICIT DISORDER (ADD) WITHOUT HYPERACTIVITY: ICD-10-CM

## 2022-02-02 DIAGNOSIS — F43.22 ADJUSTMENT DISORDER WITH ANXIETY: ICD-10-CM

## 2022-02-02 RX ORDER — FLUOXETINE 40 MG/1
CAPSULE ORAL
Qty: 90 CAPSULE | Refills: 0 | OUTPATIENT
Start: 2022-02-02

## 2022-02-02 NOTE — TELEPHONE ENCOUNTER
Routing refill request to provider for review/approval because:  Drug not on the [facility] refill protocol   Appointments in Next Year      Feb 14, 2022  4:00 PM  (Arrive by 3:40 PM)  ED/Hospital Follow Up with Esther Armstrong MD  [facility] ([facility] ) [phone]          Kristy Givens RN [facility]

## 2022-02-02 NOTE — TELEPHONE ENCOUNTER
Routing refill request to provider for review/approval because:  Drug not on the [facility] refill protocol.    Requested Prescriptions   Pending Prescriptions Disp Refills    clobetasol (TEMOVATE) 0.05 % external ointment 30 g 0     Sig: APPLY EXTERNALLY TO THE AFFECTED AREA TWICE DAILY        There is no refill protocol information for this order         Appointments in Next Year      Feb 14, 2022  4:00 PM  (Arrive by 3:40 PM)  ED/Hospital Follow Up with Esther Armstrong MD  [facility] ([facility] ) [phone]          Brie Maria RN, BSN  [facility]

## 2022-02-03 RX ORDER — DEXTROAMPHETAMINE SACCHARATE, AMPHETAMINE ASPARTATE, DEXTROAMPHETAMINE SULFATE AND AMPHETAMINE SULFATE 2.5; 2.5; 2.5; 2.5 MG/1; MG/1; MG/1; MG/1
10 TABLET ORAL DAILY
Qty: 30 TABLET | Refills: 0 | Status: SHIPPED | OUTPATIENT
Start: 2022-02-03 | End: 2022-03-14

## 2022-02-03 RX ORDER — DEXTROAMPHETAMINE SACCHARATE, AMPHETAMINE ASPARTATE MONOHYDRATE, DEXTROAMPHETAMINE SULFATE AND AMPHETAMINE SULFATE 3.75; 3.75; 3.75; 3.75 MG/1; MG/1; MG/1; MG/1
15 CAPSULE, EXTENDED RELEASE ORAL DAILY
Qty: 30 CAPSULE | Refills: 0 | Status: SHIPPED | OUTPATIENT
Start: 2022-02-03 | End: 2022-03-14

## 2022-02-03 RX ORDER — CLOBETASOL PROPIONATE 0.5 MG/G
OINTMENT TOPICAL
Qty: 30 G | Refills: 0 | Status: SHIPPED | OUTPATIENT
Start: 2022-02-03 | End: 2022-07-01

## 2022-03-14 ENCOUNTER — MYC REFILL (OUTPATIENT)
Dept: FAMILY MEDICINE | Facility: CLINIC | Age: 57
End: 2022-03-14
Payer: COMMERCIAL

## 2022-03-14 DIAGNOSIS — F98.8 ATTENTION DEFICIT DISORDER (ADD) WITHOUT HYPERACTIVITY: ICD-10-CM

## 2022-03-14 DIAGNOSIS — F43.22 ADJUSTMENT DISORDER WITH ANXIETY: ICD-10-CM

## 2022-03-14 RX ORDER — FLUOXETINE 40 MG/1
CAPSULE ORAL
Qty: 90 CAPSULE | Refills: 0 | Status: CANCELLED | OUTPATIENT
Start: 2022-03-14

## 2022-03-15 DIAGNOSIS — E03.4 HYPOTHYROIDISM DUE TO ACQUIRED ATROPHY OF THYROID: ICD-10-CM

## 2022-03-15 DIAGNOSIS — F43.22 ADJUSTMENT DISORDER WITH ANXIETY: ICD-10-CM

## 2022-03-16 DIAGNOSIS — F43.22 ADJUSTMENT DISORDER WITH ANXIETY: ICD-10-CM

## 2022-03-16 RX ORDER — FLUOXETINE 40 MG/1
CAPSULE ORAL
Qty: 90 CAPSULE | Refills: 0 | Status: SHIPPED | OUTPATIENT
Start: 2022-03-16 | End: 2022-06-29

## 2022-03-16 RX ORDER — LEVOTHYROXINE SODIUM 125 UG/1
125 TABLET ORAL DAILY
Qty: 30 TABLET | Refills: 0 | Status: SHIPPED | OUTPATIENT
Start: 2022-03-16 | End: 2022-04-18

## 2022-03-16 RX ORDER — DEXTROAMPHETAMINE SACCHARATE, AMPHETAMINE ASPARTATE, DEXTROAMPHETAMINE SULFATE AND AMPHETAMINE SULFATE 2.5; 2.5; 2.5; 2.5 MG/1; MG/1; MG/1; MG/1
10 TABLET ORAL DAILY
Qty: 30 TABLET | Refills: 0 | Status: SHIPPED | OUTPATIENT
Start: 2022-03-16 | End: 2022-07-01

## 2022-03-16 RX ORDER — DEXTROAMPHETAMINE SACCHARATE, AMPHETAMINE ASPARTATE MONOHYDRATE, DEXTROAMPHETAMINE SULFATE AND AMPHETAMINE SULFATE 3.75; 3.75; 3.75; 3.75 MG/1; MG/1; MG/1; MG/1
15 CAPSULE, EXTENDED RELEASE ORAL DAILY
Qty: 30 CAPSULE | Refills: 0 | Status: SHIPPED | OUTPATIENT
Start: 2022-03-16 | End: 2022-07-01

## 2022-03-16 NOTE — TELEPHONE ENCOUNTER
Refill sent.  Follow up needed for additional refills - message on script.   Has appointment scheduled.    PSK

## 2022-03-16 NOTE — TELEPHONE ENCOUNTER
"Requested Prescriptions   Pending Prescriptions Disp Refills    levothyroxine (SYNTHROID/LEVOTHROID) 125 MCG tablet [Pharmacy Med Name: LEVOTHYROXINE 0.125MG (125MCG) TAB] 90 tablet 3     Sig: TAKE 1 TABLET BY MOUTH DAILY        Thyroid Protocol Failed - 3/15/2022  3:35 AM        Failed - Normal TSH on file in past 12 months     Recent Labs   Lab Test 01/26/21  0702   TSH 2.56                Passed - Patient is 12 years or older        Passed - Recent (12 mo) or future (30 days) visit within the authorizing provider's specialty     Patient has had an office visit with the authorizing provider or a provider within the authorizing providers department within the previous 12 mos or has a future within next 30 days. See \"Patient Info\" tab in inbasket, or \"Choose Columns\" in Meds & Orders section of the refill encounter.              Passed - Medication is active on med list        Passed - No active pregnancy on record     If patient is pregnant or has had a positive pregnancy test, please check TSH.          Passed - No positive pregnancy test in past 12 months     If patient is pregnant or has had a positive pregnancy test, please check TSH.            Refused Prescriptions Disp Refills    FLUoxetine (PROZAC) 20 MG capsule [Pharmacy Med Name: FLUOXETINE 20MG CAPSULES] 90 capsule 3     Sig: TAKE 1 CAPSULE(20 MG) BY MOUTH DAILY        SSRIs Protocol Passed - 3/15/2022  3:35 AM        Passed - Recent (12 mo) or future (30 days) visit within the authorizing provider's specialty     Patient has had an office visit with the authorizing provider or a provider within the authorizing providers department within the previous 12 mos or has a future within next 30 days. See \"Patient Info\" tab in inbasket, or \"Choose Columns\" in Meds & Orders section of the refill encounter.              Passed - Medication is active on med list        Passed - Patient is age 18 or older        Passed - No active pregnancy on record        Passed " - No positive pregnancy test in last 12 months

## 2022-03-16 NOTE — TELEPHONE ENCOUNTER
"Pt has appointment scheduled.    Requested Prescriptions   Pending Prescriptions Disp Refills    FLUoxetine (PROZAC) 40 MG capsule 90 capsule 0        SSRIs Protocol Passed - 3/14/2022 12:37 PM        Passed - Recent (12 mo) or future (30 days) visit within the authorizing provider's specialty     Patient has had an office visit with the authorizing provider or a provider within the authorizing providers department within the previous 12 mos or has a future within next 30 days. See \"Patient Info\" tab in inbasket, or \"Choose Columns\" in Meds & Orders section of the refill encounter.              Passed - Medication is active on med list        Passed - Patient is age 18 or older        Passed - No active pregnancy on record        Passed - No positive pregnancy test in last 12 months           amphetamine-dextroamphetamine (ADDERALL) 10 MG tablet 30 tablet 0     Sig: Take 1 tablet (10 mg) by mouth daily By 3 pm if needed for evening activites.  +++ appointment needed for additional refills +++        There is no refill protocol information for this order        amphetamine-dextroamphetamine (ADDERALL XR) 15 MG 24 hr capsule 30 capsule 0     Sig: Take 1 capsule (15 mg) by mouth daily +++ appointment needed for additional refills +++        There is no refill protocol information for this order           "

## 2022-04-16 DIAGNOSIS — E03.4 HYPOTHYROIDISM DUE TO ACQUIRED ATROPHY OF THYROID: ICD-10-CM

## 2022-04-18 RX ORDER — LEVOTHYROXINE SODIUM 125 UG/1
125 TABLET ORAL DAILY
Qty: 15 TABLET | Refills: 0 | Status: SHIPPED | OUTPATIENT
Start: 2022-04-18 | End: 2022-07-01

## 2022-04-18 NOTE — TELEPHONE ENCOUNTER
Attempted to contact patient to schedule an appointment. No answer, no voicemail available.    Kristy Givens RN [facility]

## 2022-04-18 NOTE — TELEPHONE ENCOUNTER
Routing refill request to provider for review/approval because:  Gabbie given x1 and patient did not follow up, please advise

## 2022-05-04 ENCOUNTER — MYC REFILL (OUTPATIENT)
Dept: FAMILY MEDICINE | Facility: CLINIC | Age: 57
End: 2022-05-04

## 2022-05-04 DIAGNOSIS — L30.9 ECZEMA OF BOTH HANDS: ICD-10-CM

## 2022-05-04 DIAGNOSIS — F98.8 ATTENTION DEFICIT DISORDER (ADD) WITHOUT HYPERACTIVITY: ICD-10-CM

## 2022-05-04 DIAGNOSIS — E03.4 HYPOTHYROIDISM DUE TO ACQUIRED ATROPHY OF THYROID: ICD-10-CM

## 2022-05-04 NOTE — TELEPHONE ENCOUNTER
"Requested Prescriptions   Pending Prescriptions Disp Refills    clobetasol (TEMOVATE) 0.05 % external ointment 30 g 0     Sig: APPLY EXTERNALLY TO THE AFFECTED AREA TWICE DAILY        There is no refill protocol information for this order        amphetamine-dextroamphetamine (ADDERALL) 10 MG tablet 30 tablet 0     Sig: Take 1 tablet (10 mg) by mouth daily By 3 pm if needed for evening activites.  +++ appointment needed for additional refills +++        There is no refill protocol information for this order        amphetamine-dextroamphetamine (ADDERALL XR) 15 MG 24 hr capsule 30 capsule 0     Sig: Take 1 capsule (15 mg) by mouth daily +++ appointment needed for additional refills +++        There is no refill protocol information for this order        levothyroxine (SYNTHROID/LEVOTHROID) 125 MCG tablet 15 tablet 0     Sig: Take 1 tablet (125 mcg) by mouth daily +++ appointment needed for additional refills +++        Thyroid Protocol Failed - 5/4/2022  1:57 PM        Failed - Normal TSH on file in past 12 months     Recent Labs   Lab Test 01/26/21  0702   TSH 2.56                Passed - Patient is 12 years or older        Passed - Recent (12 mo) or future (30 days) visit within the authorizing provider's specialty     Patient has had an office visit with the authorizing provider or a provider within the authorizing providers department within the previous 12 mos or has a future within next 30 days. See \"Patient Info\" tab in inbasket, or \"Choose Columns\" in Meds & Orders section of the refill encounter.              Passed - Medication is active on med list        Passed - No active pregnancy on record     If patient is pregnant or has had a positive pregnancy test, please check TSH.          Passed - No positive pregnancy test in past 12 months     If patient is pregnant or has had a positive pregnancy test, please check TSH.                "

## 2022-05-16 NOTE — TELEPHONE ENCOUNTER
Patient needs to be seen by Esther Armstrong MD  (provider or resource)  Is there a time frame in which the patient should be seen Yes: within month  What does the patient need to be seen regarding in person visit, labs.  Can do virtual visit with previsit labs if patient desires.    Does patient need to come fasting No  Phone: [phone] (home)  When workflow completed Route to provider if refill needed to get to appointment OR previsit lab orders needed.    Clinic: [facility] at [phone]    'Hi, this is (your name) I am calling from (provider's name) office. After reviewing your chart, (Provider's name) has indicated that you need an appointment to review (reason for visit). May I assist you in making this appointment? (Please contact us via [employer] or by phone at (Clinic name and Phone number) to schedule this appointment at your earliest convenience)'    Was first outreach attempted?No  If yes, the Second attempt due on:

## 2022-05-17 NOTE — TELEPHONE ENCOUNTER
Attempted to contact patient to set up an appointment. No answer and no voicemail available. Also sent patient a Entrechart message to call for an appointment.      Kristy Givens RN [facility]

## 2022-05-18 RX ORDER — LEVOTHYROXINE SODIUM 125 UG/1
125 TABLET ORAL DAILY
Qty: 15 TABLET | Refills: 0 | Status: CANCELLED | OUTPATIENT
Start: 2022-05-18

## 2022-05-18 RX ORDER — DEXTROAMPHETAMINE SACCHARATE, AMPHETAMINE ASPARTATE, DEXTROAMPHETAMINE SULFATE AND AMPHETAMINE SULFATE 2.5; 2.5; 2.5; 2.5 MG/1; MG/1; MG/1; MG/1
10 TABLET ORAL DAILY
Qty: 30 TABLET | Refills: 0 | Status: CANCELLED | OUTPATIENT
Start: 2022-05-18

## 2022-05-18 RX ORDER — CLOBETASOL PROPIONATE 0.5 MG/G
OINTMENT TOPICAL
Qty: 30 G | Refills: 0 | Status: CANCELLED | OUTPATIENT
Start: 2022-05-18

## 2022-05-18 RX ORDER — DEXTROAMPHETAMINE SACCHARATE, AMPHETAMINE ASPARTATE MONOHYDRATE, DEXTROAMPHETAMINE SULFATE AND AMPHETAMINE SULFATE 3.75; 3.75; 3.75; 3.75 MG/1; MG/1; MG/1; MG/1
15 CAPSULE, EXTENDED RELEASE ORAL DAILY
Qty: 30 CAPSULE | Refills: 0 | Status: CANCELLED | OUTPATIENT
Start: 2022-05-18

## 2022-06-04 ENCOUNTER — HEALTH MAINTENANCE LETTER (OUTPATIENT)
Age: 57
End: 2022-06-04

## 2022-06-12 DIAGNOSIS — E03.4 HYPOTHYROIDISM DUE TO ACQUIRED ATROPHY OF THYROID: ICD-10-CM

## 2022-06-13 NOTE — TELEPHONE ENCOUNTER
Routing refill request to provider for review/approval because:  Gabbie given x1 and patient did not follow up, please advise    Brie Maria RN, BSN  [facility]

## 2022-06-14 RX ORDER — LEVOTHYROXINE SODIUM 125 UG/1
TABLET ORAL
Qty: 15 TABLET | Refills: 0 | OUTPATIENT
Start: 2022-06-14

## 2022-06-14 NOTE — TELEPHONE ENCOUNTER
Patient has not been seen since jan 2021.  Has scheduled and cancelled or no showed appointment  (6 times since August 2021) - will need follow up appointment scheduled for refill.  MAX

## 2022-07-01 ENCOUNTER — MYC REFILL (OUTPATIENT)
Dept: FAMILY MEDICINE | Facility: CLINIC | Age: 57
End: 2022-07-01

## 2022-07-01 DIAGNOSIS — F43.22 ADJUSTMENT DISORDER WITH ANXIETY: ICD-10-CM

## 2022-07-01 DIAGNOSIS — L30.9 ECZEMA OF BOTH HANDS: ICD-10-CM

## 2022-07-01 DIAGNOSIS — F98.8 ATTENTION DEFICIT DISORDER (ADD) WITHOUT HYPERACTIVITY: ICD-10-CM

## 2022-07-01 DIAGNOSIS — E03.4 HYPOTHYROIDISM DUE TO ACQUIRED ATROPHY OF THYROID: ICD-10-CM

## 2022-07-01 RX ORDER — FLUOXETINE 40 MG/1
CAPSULE ORAL
Qty: 90 CAPSULE | Refills: 0 | OUTPATIENT
Start: 2022-07-01

## 2022-07-01 ASSESSMENT — ANXIETY QUESTIONNAIRES
2. NOT BEING ABLE TO STOP OR CONTROL WORRYING: MORE THAN HALF THE DAYS
GAD7 TOTAL SCORE: 12
1. FEELING NERVOUS, ANXIOUS, OR ON EDGE: MORE THAN HALF THE DAYS
4. TROUBLE RELAXING: MORE THAN HALF THE DAYS
7. FEELING AFRAID AS IF SOMETHING AWFUL MIGHT HAPPEN: NOT AT ALL
5. BEING SO RESTLESS THAT IT IS HARD TO SIT STILL: MORE THAN HALF THE DAYS
6. BECOMING EASILY ANNOYED OR IRRITABLE: MORE THAN HALF THE DAYS
3. WORRYING TOO MUCH ABOUT DIFFERENT THINGS: MORE THAN HALF THE DAYS

## 2022-07-01 ASSESSMENT — PATIENT HEALTH QUESTIONNAIRE - PHQ9: SUM OF ALL RESPONSES TO PHQ QUESTIONS 1-9: 9

## 2022-07-02 RX ORDER — LEVOTHYROXINE SODIUM 125 UG/1
125 TABLET ORAL DAILY
Qty: 30 TABLET | Refills: 0 | Status: SHIPPED | OUTPATIENT
Start: 2022-07-02 | End: 2022-08-02

## 2022-07-02 RX ORDER — DEXTROAMPHETAMINE SACCHARATE, AMPHETAMINE ASPARTATE, DEXTROAMPHETAMINE SULFATE AND AMPHETAMINE SULFATE 2.5; 2.5; 2.5; 2.5 MG/1; MG/1; MG/1; MG/1
10 TABLET ORAL DAILY
Qty: 30 TABLET | Refills: 0 | Status: SHIPPED | OUTPATIENT
Start: 2022-07-02 | End: 2022-08-17

## 2022-07-02 RX ORDER — DEXTROAMPHETAMINE SACCHARATE, AMPHETAMINE ASPARTATE MONOHYDRATE, DEXTROAMPHETAMINE SULFATE AND AMPHETAMINE SULFATE 3.75; 3.75; 3.75; 3.75 MG/1; MG/1; MG/1; MG/1
15 CAPSULE, EXTENDED RELEASE ORAL DAILY
Qty: 30 CAPSULE | Refills: 0 | Status: SHIPPED | OUTPATIENT
Start: 2022-07-02 | End: 2022-07-20 | Stop reason: DRUGHIGH

## 2022-07-02 RX ORDER — CLOBETASOL PROPIONATE 0.5 MG/G
OINTMENT TOPICAL
Qty: 30 G | Refills: 0 | Status: SHIPPED | OUTPATIENT
Start: 2022-07-02 | End: 2022-07-20

## 2022-07-02 NOTE — TELEPHONE ENCOUNTER
Has appointment scheduled for follow up.  Multiple failed and canceled appointment.  No additional refills if appointment not completed next week.    ANGK

## 2022-07-07 ASSESSMENT — PATIENT HEALTH QUESTIONNAIRE - PHQ9
SUM OF ALL RESPONSES TO PHQ QUESTIONS 1-9: 9
10. IF YOU CHECKED OFF ANY PROBLEMS, HOW DIFFICULT HAVE THESE PROBLEMS MADE IT FOR YOU TO DO YOUR WORK, TAKE CARE OF THINGS AT HOME, OR GET ALONG WITH OTHER PEOPLE: SOMEWHAT DIFFICULT

## 2022-07-20 ENCOUNTER — TELEPHONE (OUTPATIENT)
Dept: FAMILY MEDICINE | Facility: CLINIC | Age: 57
End: 2022-07-20

## 2022-07-20 ENCOUNTER — VIRTUAL VISIT (OUTPATIENT)
Dept: FAMILY MEDICINE | Facility: CLINIC | Age: 57
End: 2022-07-20
Payer: COMMERCIAL

## 2022-07-20 DIAGNOSIS — E78.5 HYPERLIPIDEMIA LDL GOAL <130: ICD-10-CM

## 2022-07-20 DIAGNOSIS — L30.9 ECZEMA OF BOTH HANDS: ICD-10-CM

## 2022-07-20 DIAGNOSIS — E03.4 HYPOTHYROIDISM DUE TO ACQUIRED ATROPHY OF THYROID: ICD-10-CM

## 2022-07-20 DIAGNOSIS — Z13.1 SCREENING FOR DIABETES MELLITUS: ICD-10-CM

## 2022-07-20 DIAGNOSIS — F43.22 ADJUSTMENT DISORDER WITH ANXIETY: ICD-10-CM

## 2022-07-20 DIAGNOSIS — Z11.59 ENCOUNTER FOR HEPATITIS C SCREENING TEST FOR LOW RISK PATIENT: ICD-10-CM

## 2022-07-20 DIAGNOSIS — F90.2 ATTENTION DEFICIT HYPERACTIVITY DISORDER (ADHD), COMBINED TYPE: Primary | ICD-10-CM

## 2022-07-20 DIAGNOSIS — Z12.31 VISIT FOR SCREENING MAMMOGRAM: ICD-10-CM

## 2022-07-20 PROCEDURE — 99215 OFFICE O/P EST HI 40 MIN: CPT | Mod: 95 | Performed by: FAMILY MEDICINE

## 2022-07-20 RX ORDER — CLOBETASOL PROPIONATE 0.5 MG/G
OINTMENT TOPICAL
Qty: 30 G | Refills: 0 | Status: SHIPPED | OUTPATIENT
Start: 2022-07-20 | End: 2022-08-28

## 2022-07-20 RX ORDER — FLUOXETINE 40 MG/1
40 CAPSULE ORAL DAILY
Qty: 90 CAPSULE | Refills: 1 | Status: SHIPPED | OUTPATIENT
Start: 2022-07-20 | End: 2023-04-07

## 2022-07-20 RX ORDER — DEXTROAMPHETAMINE SACCHARATE, AMPHETAMINE ASPARTATE MONOHYDRATE, DEXTROAMPHETAMINE SULFATE AND AMPHETAMINE SULFATE 5; 5; 5; 5 MG/1; MG/1; MG/1; MG/1
20 CAPSULE, EXTENDED RELEASE ORAL DAILY
Qty: 30 CAPSULE | Refills: 0 | Status: SHIPPED | OUTPATIENT
Start: 2022-07-20 | End: 2022-08-17

## 2022-07-20 ASSESSMENT — ANXIETY QUESTIONNAIRES
5. BEING SO RESTLESS THAT IT IS HARD TO SIT STILL: MORE THAN HALF THE DAYS
1. FEELING NERVOUS, ANXIOUS, OR ON EDGE: MORE THAN HALF THE DAYS
6. BECOMING EASILY ANNOYED OR IRRITABLE: MORE THAN HALF THE DAYS
7. FEELING AFRAID AS IF SOMETHING AWFUL MIGHT HAPPEN: NOT AT ALL
GAD7 TOTAL SCORE: 12
GAD7 TOTAL SCORE: 12
2. NOT BEING ABLE TO STOP OR CONTROL WORRYING: MORE THAN HALF THE DAYS
3. WORRYING TOO MUCH ABOUT DIFFERENT THINGS: MORE THAN HALF THE DAYS
7. FEELING AFRAID AS IF SOMETHING AWFUL MIGHT HAPPEN: NOT AT ALL
GAD7 TOTAL SCORE: 12
4. TROUBLE RELAXING: MORE THAN HALF THE DAYS

## 2022-07-20 NOTE — TELEPHONE ENCOUNTER
Attempted to reach pt to set up ppt per PCP request below.    Please call patient re:  lab appointment 1 week   Schedule her for annual in the next couple of months too.     No answer LM for pt to call back to help get this scheduled or she can schedule via [employer]. If pt returns call please help schedule yesenia per pcp request.  Magalis Swanson CMA

## 2022-07-20 NOTE — PROGRESS NOTES
ANJELICA is a 56 year old who is being evaluated via a billable video visit.      How would you like to obtain your AVS? MyChart  If the video visit is dropped, the invitation should be resent by: Text to cell phone: x  [phone]   Will anyone else be joining your video visit? No        Assessment & Plan     Attention deficit hyperactivity disorder (ADHD), combined type  Unable to get the 15 mg pill.  Symptoms not totally controlled on that dosage.  Has taken 20 mg in past as well.  Trial 20 mg pill in AM with short acting to continue in PM if needed based on activities.   - amphetamine-dextroamphetamine (ADDERALL XR) 20 MG 24 hr capsule; Take 1 capsule (20 mg) by mouth daily  - Comprehensive metabolic panel (BMP + Alb, Alk Phos, ALT, AST, Total. Bili, TP); Future    Adjustment disorder with anxiety  Continue current fluoxetine with regular use.  Follow up assessment of symptoms in 6-8 weeks.  - FLUoxetine (PROZAC) 40 MG capsule; Take 1 capsule (40 mg) by mouth daily  - Comprehensive metabolic panel (BMP + Alb, Alk Phos, ALT, AST, Total. Bili, TP); Future  - CBC with platelets; Future    Hyperlipidemia LDL goal <130  Return to clinic for labs.   - Comprehensive metabolic panel (BMP + Alb, Alk Phos, ALT, AST, Total. Bili, TP); Future  - Lipid panel reflex to direct LDL Non-fasting; Future    Hypothyroidism due to acquired atrophy of thyroid  Lab needed,  Refills can be updated when results available.  - TSH WITH FREE T4 REFLEX; Future    Eczema of both hands  As needed use of Clobetasol.      Screening for diabetes mellitus  Return to clinic for lab.     Encounter for hepatitis C screening test for low risk patient  Lab planned.   - Hepatitis C Screen Reflex to HCV RNA Quant and Genotype; Future    Visit for screening mammogram  Recommended follow up prior to school starting.   - MA SCREENING DIGITAL BILAT - Future  (s+30); Future    Ordering of each unique test  Prescription drug management  42 minutes spent on the  date of the encounter doing chart review, review of outside records, patient visit and documentation        Patient Instructions   Increase Adderall XR to 20 mg daily.  I am sending new script to the pharmacy.  If Jayro has difficulty getting that dosage, notify me.  Continue the short acting adderall in afternoons.    Continue fluoxetine 40 mg daily.    Return to clinic for labs.        Mammogram recommended.   You can call [phone] to schedule this at the [facility] building in [place]         Return in about 1 week (around 7/27/2022) for Lab Work.  Return for annual exam in the next few months..    Esther Armstrong MD  [facility]    Subjective   ANJELICA is a 56 year old presenting for the following health issues:  Refill Request      History of Present Illness       Mental Health Follow-up:  Patient presents to follow-up on Depression & Anxiety.  The patient is not having other symptoms associated with depression.  Patient's anxiety since last visit has been:  Worse  The patient is not having other symptoms associated with anxiety.    Patient is feeling anxious or having panic attacks.  Patient has no concerns about alcohol or drug use.    Reason for visit:  Medication refill Gary consumes 0 sweetened beverage(s) daily.She exercises with enough effort to increase her heart rate 9 or less minutes per day.  She exercises with enough effort to increase her heart rate 3 or less days per week.   She is not taking prescribed medications regularly due to other.    Today's PHQ-9         PHQ-9 Total Score: 9    PHQ-9 Q9 Thoughts of better off dead/self-harm past 2 weeks :   Not at all    How difficult have these problems made it for you to do your work, take care of things at home, or get along with other people: Somewhat difficult  Today's GALE-7 Score: 12       Depression and Anxiety Follow-Up  Was not using medication regularly - back on now and symptoms are improving.    How are you  doing with your depression since your last visit? Worsened     How are you doing with your anxiety since your last visit?  Worsened     Are you having other symptoms that might be associated with depression or anxiety? No    Have you had a significant life event? OTHER: divorce     Do you have any concerns with your use of alcohol or other drugs? No  Situational but also better.  ? side effects of not controlling the attention issues.    Was off for a few weeks but back on again now.  Resumed regularly beginning of July.  Thinks improved.  Social History     Tobacco Use     Smoking status: Never Smoker     Smokeless tobacco: Never Used   Substance Use Topics     Alcohol use: Yes     Comment: INFREQUENTLY     Drug use: No     PHQ 3/10/2021 7/1/2022 7/1/2022   PHQ-9 Total Score 10 9 9   Q9: Thoughts of better off dead/self-harm past 2 weeks Not at all Not at all Not at all     GALE-7 SCORE 7/1/2022 7/1/2022 7/20/2022   Total Score - - -   Total Score - 12 (moderate anxiety) 12 (moderate anxiety)   Total Score 12 12 12         Suicide Assessment Five-step Evaluation and Treatment (SAFE-T)    ADHD - having difficulty getting medication from the pharmacy due to supply of the XR medication. Taking XR at 630 AM and taking short acting 2-3 PM.  If having XR - would not always use the short acting.  Length of need:  12 hours.  Restless at times.  Sleep: no issues.        Palms thickened skin - left worse, itchy, cracking, scaling.  Has clobetasol.      Hypothyroidism Follow-up      Since last visit, patient describes the following symptoms: Weight stable, no hair loss, no skin changes, no constipation, no loose stools      Review of Systems   Constitutional, HEENT, cardiovascular, pulmonary, GI, , musculoskeletal, neuro, skin, endocrine and psych systems are negative, except as otherwise noted.      Objective           Vitals:  No vitals were obtained today due to virtual visit.    Physical Exam   GENERAL: Healthy, alert  and no distress  EYES: Eyes grossly normal to inspection.  No discharge or erythema, or obvious scleral/conjunctival abnormalities.  RESP: No audible wheeze, cough, or visible cyanosis.  No visible retractions or increased work of breathing.    SKIN: Visible skin clear. No significant rash, abnormal pigmentation or lesions.  Palms with thickened area, no erythema or skin breakdown.    NEURO: Cranial nerves grossly intact.  Mentation and speech appropriate for age.  PSYCH: mentation appears normal, affect flat, anxious, judgement and insight intact and appearance well groomed                Video-Visit Details    Video Start Time: 8:40 AM    Type of service:  Video Visit    Video End Time:9:04 AM    Originating Location (pt. Location): Home    Distant Location (provider location):  [facility]     Platform used for Video Visit: RalfLipperhey    .  ..

## 2022-07-20 NOTE — PATIENT INSTRUCTIONS
Increase Adderall XR to 20 mg daily.  I am sending new script to the pharmacy.  If Jayro has difficulty getting that dosage, notify me.  Continue the short acting adderall in afternoons.    Continue fluoxetine 40 mg daily.    Return to clinic for labs.        Mammogram recommended.   You can call [phone] to schedule [facility] at [facility] in [place]

## 2022-08-01 DIAGNOSIS — E03.4 HYPOTHYROIDISM DUE TO ACQUIRED ATROPHY OF THYROID: ICD-10-CM

## 2022-08-02 RX ORDER — LEVOTHYROXINE SODIUM 125 UG/1
TABLET ORAL
Qty: 30 TABLET | Refills: 0 | Status: SHIPPED | OUTPATIENT
Start: 2022-08-02 | End: 2022-09-26

## 2022-08-17 ENCOUNTER — MYC REFILL (OUTPATIENT)
Dept: FAMILY MEDICINE | Facility: CLINIC | Age: 57
End: 2022-08-17

## 2022-08-17 DIAGNOSIS — F90.2 ATTENTION DEFICIT HYPERACTIVITY DISORDER (ADHD), COMBINED TYPE: ICD-10-CM

## 2022-08-17 DIAGNOSIS — F98.8 ATTENTION DEFICIT DISORDER (ADD) WITHOUT HYPERACTIVITY: ICD-10-CM

## 2022-08-18 RX ORDER — DEXTROAMPHETAMINE SACCHARATE, AMPHETAMINE ASPARTATE, DEXTROAMPHETAMINE SULFATE AND AMPHETAMINE SULFATE 2.5; 2.5; 2.5; 2.5 MG/1; MG/1; MG/1; MG/1
10 TABLET ORAL DAILY
Qty: 30 TABLET | Refills: 0 | Status: SHIPPED | OUTPATIENT
Start: 2022-08-18 | End: 2022-09-26

## 2022-08-18 RX ORDER — DEXTROAMPHETAMINE SACCHARATE, AMPHETAMINE ASPARTATE MONOHYDRATE, DEXTROAMPHETAMINE SULFATE AND AMPHETAMINE SULFATE 5; 5; 5; 5 MG/1; MG/1; MG/1; MG/1
20 CAPSULE, EXTENDED RELEASE ORAL DAILY
Qty: 30 CAPSULE | Refills: 0 | Status: SHIPPED | OUTPATIENT
Start: 2022-08-18 | End: 2022-09-26

## 2022-08-28 ENCOUNTER — MYC REFILL (OUTPATIENT)
Dept: FAMILY MEDICINE | Facility: CLINIC | Age: 57
End: 2022-08-28

## 2022-08-28 DIAGNOSIS — L30.9 ECZEMA OF BOTH HANDS: ICD-10-CM

## 2022-08-29 RX ORDER — CLOBETASOL PROPIONATE 0.5 MG/G
OINTMENT TOPICAL
Qty: 30 G | Refills: 0 | Status: SHIPPED | OUTPATIENT
Start: 2022-08-29 | End: 2023-01-17

## 2022-09-05 DIAGNOSIS — E03.4 HYPOTHYROIDISM DUE TO ACQUIRED ATROPHY OF THYROID: ICD-10-CM

## 2022-09-23 NOTE — TELEPHONE ENCOUNTER
Mail scripts to pharmacy.  MAX    
Rx mailed to pharmacy.      Gina STEVENSON, Patient Care     
Spoke to pt and conveyed message below. He verbalized understanding.   
None

## 2022-09-26 ENCOUNTER — MYC REFILL (OUTPATIENT)
Dept: FAMILY MEDICINE | Facility: CLINIC | Age: 57
End: 2022-09-26

## 2022-09-26 DIAGNOSIS — F98.8 ATTENTION DEFICIT DISORDER (ADD) WITHOUT HYPERACTIVITY: ICD-10-CM

## 2022-09-26 DIAGNOSIS — F90.2 ATTENTION DEFICIT HYPERACTIVITY DISORDER (ADHD), COMBINED TYPE: ICD-10-CM

## 2022-09-26 DIAGNOSIS — E03.4 HYPOTHYROIDISM DUE TO ACQUIRED ATROPHY OF THYROID: ICD-10-CM

## 2022-10-01 RX ORDER — DEXTROAMPHETAMINE SACCHARATE, AMPHETAMINE ASPARTATE, DEXTROAMPHETAMINE SULFATE AND AMPHETAMINE SULFATE 2.5; 2.5; 2.5; 2.5 MG/1; MG/1; MG/1; MG/1
10 TABLET ORAL DAILY
Qty: 30 TABLET | Refills: 0 | Status: SHIPPED | OUTPATIENT
Start: 2022-10-01 | End: 2022-10-28

## 2022-10-01 RX ORDER — LEVOTHYROXINE SODIUM 125 UG/1
125 TABLET ORAL DAILY
Qty: 15 TABLET | Refills: 0 | Status: SHIPPED | OUTPATIENT
Start: 2022-10-01 | End: 2022-10-18

## 2022-10-01 RX ORDER — DEXTROAMPHETAMINE SACCHARATE, AMPHETAMINE ASPARTATE MONOHYDRATE, DEXTROAMPHETAMINE SULFATE AND AMPHETAMINE SULFATE 5; 5; 5; 5 MG/1; MG/1; MG/1; MG/1
20 CAPSULE, EXTENDED RELEASE ORAL DAILY
Qty: 30 CAPSULE | Refills: 0 | Status: SHIPPED | OUTPATIENT
Start: 2022-10-01 | End: 2022-10-28

## 2022-10-10 ENCOUNTER — HEALTH MAINTENANCE LETTER (OUTPATIENT)
Age: 57
End: 2022-10-10

## 2022-10-15 DIAGNOSIS — E03.4 HYPOTHYROIDISM DUE TO ACQUIRED ATROPHY OF THYROID: ICD-10-CM

## 2022-10-18 RX ORDER — LEVOTHYROXINE SODIUM 125 UG/1
TABLET ORAL
Qty: 7 TABLET | Refills: 0 | Status: SHIPPED | OUTPATIENT
Start: 2022-10-18 | End: 2023-03-29

## 2022-10-28 ENCOUNTER — MYC REFILL (OUTPATIENT)
Dept: FAMILY MEDICINE | Facility: CLINIC | Age: 57
End: 2022-10-28

## 2022-10-28 DIAGNOSIS — F90.2 ATTENTION DEFICIT HYPERACTIVITY DISORDER (ADHD), COMBINED TYPE: ICD-10-CM

## 2022-10-28 DIAGNOSIS — F98.8 ATTENTION DEFICIT DISORDER (ADD) WITHOUT HYPERACTIVITY: ICD-10-CM

## 2022-10-31 RX ORDER — DEXTROAMPHETAMINE SACCHARATE, AMPHETAMINE ASPARTATE MONOHYDRATE, DEXTROAMPHETAMINE SULFATE AND AMPHETAMINE SULFATE 5; 5; 5; 5 MG/1; MG/1; MG/1; MG/1
20 CAPSULE, EXTENDED RELEASE ORAL DAILY
Qty: 30 CAPSULE | Refills: 0 | Status: SHIPPED | OUTPATIENT
Start: 2022-10-31 | End: 2022-11-28

## 2022-10-31 RX ORDER — DEXTROAMPHETAMINE SACCHARATE, AMPHETAMINE ASPARTATE, DEXTROAMPHETAMINE SULFATE AND AMPHETAMINE SULFATE 2.5; 2.5; 2.5; 2.5 MG/1; MG/1; MG/1; MG/1
10 TABLET ORAL DAILY
Qty: 30 TABLET | Refills: 0 | Status: SHIPPED | OUTPATIENT
Start: 2022-10-31 | End: 2022-11-28

## 2022-11-01 RX ORDER — LEVOTHYROXINE SODIUM 125 UG/1
TABLET ORAL
Qty: 30 TABLET | Refills: 0 | OUTPATIENT
Start: 2022-11-01

## 2022-11-28 ENCOUNTER — MYC REFILL (OUTPATIENT)
Dept: FAMILY MEDICINE | Facility: CLINIC | Age: 57
End: 2022-11-28

## 2022-11-28 DIAGNOSIS — F98.8 ATTENTION DEFICIT DISORDER (ADD) WITHOUT HYPERACTIVITY: ICD-10-CM

## 2022-11-28 DIAGNOSIS — F90.2 ATTENTION DEFICIT HYPERACTIVITY DISORDER (ADHD), COMBINED TYPE: ICD-10-CM

## 2022-11-29 RX ORDER — DEXTROAMPHETAMINE SACCHARATE, AMPHETAMINE ASPARTATE MONOHYDRATE, DEXTROAMPHETAMINE SULFATE AND AMPHETAMINE SULFATE 5; 5; 5; 5 MG/1; MG/1; MG/1; MG/1
20 CAPSULE, EXTENDED RELEASE ORAL DAILY
Qty: 30 CAPSULE | Refills: 0 | Status: SHIPPED | OUTPATIENT
Start: 2022-11-29 | End: 2023-01-17

## 2022-11-29 RX ORDER — DEXTROAMPHETAMINE SACCHARATE, AMPHETAMINE ASPARTATE, DEXTROAMPHETAMINE SULFATE AND AMPHETAMINE SULFATE 2.5; 2.5; 2.5; 2.5 MG/1; MG/1; MG/1; MG/1
10 TABLET ORAL DAILY
Qty: 30 TABLET | Refills: 0 | Status: SHIPPED | OUTPATIENT
Start: 2022-11-29 | End: 2023-01-17

## 2022-12-27 NOTE — TELEPHONE ENCOUNTER
Requested Prescriptions   Pending Prescriptions Disp Refills     amphetamine-dextroamphetamine (ADDERALL XR) 15 MG 24 hr capsule\      Last Written Prescription Date:  10/04/19  Last Fill Quantity: 30 capsule,   # refills: 0  Last Office Visit: Dalia DOWLING  Future Office visit:       Routing refill request to provider for review/approval because:  Drug not on the G, P or [facility] refill protocol or controlled substance   30 capsule 0     Sig: Take 1 capsule (15 mg) by mouth daily       There is no refill protocol information for this order           Yes

## 2023-01-17 ENCOUNTER — MYC REFILL (OUTPATIENT)
Dept: FAMILY MEDICINE | Facility: CLINIC | Age: 58
End: 2023-01-17
Payer: COMMERCIAL

## 2023-01-17 DIAGNOSIS — F98.8 ATTENTION DEFICIT DISORDER (ADD) WITHOUT HYPERACTIVITY: ICD-10-CM

## 2023-01-17 DIAGNOSIS — L30.9 ECZEMA OF BOTH HANDS: ICD-10-CM

## 2023-01-17 DIAGNOSIS — F90.2 ATTENTION DEFICIT HYPERACTIVITY DISORDER (ADHD), COMBINED TYPE: ICD-10-CM

## 2023-01-18 RX ORDER — CLOBETASOL PROPIONATE 0.5 MG/G
OINTMENT TOPICAL
Qty: 30 G | Refills: 0 | Status: SHIPPED | OUTPATIENT
Start: 2023-01-18

## 2023-01-18 RX ORDER — DEXTROAMPHETAMINE SACCHARATE, AMPHETAMINE ASPARTATE, DEXTROAMPHETAMINE SULFATE AND AMPHETAMINE SULFATE 2.5; 2.5; 2.5; 2.5 MG/1; MG/1; MG/1; MG/1
10 TABLET ORAL DAILY
Qty: 30 TABLET | Refills: 0 | Status: SHIPPED | OUTPATIENT
Start: 2023-01-18 | End: 2023-03-16

## 2023-01-18 RX ORDER — DEXTROAMPHETAMINE SACCHARATE, AMPHETAMINE ASPARTATE MONOHYDRATE, DEXTROAMPHETAMINE SULFATE AND AMPHETAMINE SULFATE 5; 5; 5; 5 MG/1; MG/1; MG/1; MG/1
20 CAPSULE, EXTENDED RELEASE ORAL DAILY
Qty: 30 CAPSULE | Refills: 0 | Status: SHIPPED | OUTPATIENT
Start: 2023-01-18 | End: 2023-03-16

## 2023-02-15 ENCOUNTER — MYC REFILL (OUTPATIENT)
Dept: FAMILY MEDICINE | Facility: CLINIC | Age: 58
End: 2023-02-15
Payer: COMMERCIAL

## 2023-02-15 DIAGNOSIS — F90.2 ATTENTION DEFICIT HYPERACTIVITY DISORDER (ADHD), COMBINED TYPE: ICD-10-CM

## 2023-02-15 DIAGNOSIS — F98.8 ATTENTION DEFICIT DISORDER (ADD) WITHOUT HYPERACTIVITY: ICD-10-CM

## 2023-02-16 RX ORDER — DEXTROAMPHETAMINE SACCHARATE, AMPHETAMINE ASPARTATE MONOHYDRATE, DEXTROAMPHETAMINE SULFATE AND AMPHETAMINE SULFATE 5; 5; 5; 5 MG/1; MG/1; MG/1; MG/1
20 CAPSULE, EXTENDED RELEASE ORAL DAILY
Qty: 30 CAPSULE | Refills: 0 | OUTPATIENT
Start: 2023-02-16

## 2023-02-16 RX ORDER — DEXTROAMPHETAMINE SACCHARATE, AMPHETAMINE ASPARTATE, DEXTROAMPHETAMINE SULFATE AND AMPHETAMINE SULFATE 2.5; 2.5; 2.5; 2.5 MG/1; MG/1; MG/1; MG/1
10 TABLET ORAL DAILY
Qty: 30 TABLET | Refills: 0 | OUTPATIENT
Start: 2023-02-16

## 2023-02-16 NOTE — TELEPHONE ENCOUNTER
Refill denied.  Appointment needed - last seen July 2022.    Virtual appointment is ok but needs lab appointment as well.        MAX

## 2023-03-16 ENCOUNTER — MYC REFILL (OUTPATIENT)
Dept: FAMILY MEDICINE | Facility: CLINIC | Age: 58
End: 2023-03-16
Payer: COMMERCIAL

## 2023-03-16 DIAGNOSIS — F90.2 ATTENTION DEFICIT HYPERACTIVITY DISORDER (ADHD), COMBINED TYPE: ICD-10-CM

## 2023-03-16 DIAGNOSIS — F98.8 ATTENTION DEFICIT DISORDER (ADD) WITHOUT HYPERACTIVITY: ICD-10-CM

## 2023-03-17 RX ORDER — DEXTROAMPHETAMINE SACCHARATE, AMPHETAMINE ASPARTATE, DEXTROAMPHETAMINE SULFATE AND AMPHETAMINE SULFATE 2.5; 2.5; 2.5; 2.5 MG/1; MG/1; MG/1; MG/1
10 TABLET ORAL DAILY
Qty: 30 TABLET | Refills: 0 | Status: SHIPPED | OUTPATIENT
Start: 2023-03-17 | End: 2023-04-17

## 2023-03-17 RX ORDER — DEXTROAMPHETAMINE SACCHARATE, AMPHETAMINE ASPARTATE MONOHYDRATE, DEXTROAMPHETAMINE SULFATE AND AMPHETAMINE SULFATE 5; 5; 5; 5 MG/1; MG/1; MG/1; MG/1
20 CAPSULE, EXTENDED RELEASE ORAL DAILY
Qty: 30 CAPSULE | Refills: 0 | Status: SHIPPED | OUTPATIENT
Start: 2023-03-17 | End: 2023-04-17

## 2023-03-29 ENCOUNTER — MYC REFILL (OUTPATIENT)
Dept: FAMILY MEDICINE | Facility: CLINIC | Age: 58
End: 2023-03-29

## 2023-03-29 ENCOUNTER — LAB (OUTPATIENT)
Dept: LAB | Facility: CLINIC | Age: 58
End: 2023-03-29
Payer: COMMERCIAL

## 2023-03-29 ENCOUNTER — MYC MEDICAL ADVICE (OUTPATIENT)
Dept: FAMILY MEDICINE | Facility: CLINIC | Age: 58
End: 2023-03-29

## 2023-03-29 DIAGNOSIS — Z11.59 ENCOUNTER FOR HEPATITIS C SCREENING TEST FOR LOW RISK PATIENT: ICD-10-CM

## 2023-03-29 DIAGNOSIS — E78.5 HYPERLIPIDEMIA LDL GOAL <130: ICD-10-CM

## 2023-03-29 DIAGNOSIS — E03.4 HYPOTHYROIDISM DUE TO ACQUIRED ATROPHY OF THYROID: ICD-10-CM

## 2023-03-29 DIAGNOSIS — F90.2 ATTENTION DEFICIT HYPERACTIVITY DISORDER (ADHD), COMBINED TYPE: ICD-10-CM

## 2023-03-29 DIAGNOSIS — F43.22 ADJUSTMENT DISORDER WITH ANXIETY: ICD-10-CM

## 2023-03-29 LAB
ALBUMIN SERPL-MCNC: 3.8 G/DL (ref 3.4–5)
ALP SERPL-CCNC: 75 U/L (ref 40–150)
ALT SERPL W P-5'-P-CCNC: 24 U/L (ref 0–50)
ANION GAP SERPL CALCULATED.3IONS-SCNC: 4 MMOL/L (ref 3–14)
AST SERPL W P-5'-P-CCNC: 18 U/L (ref 0–45)
BILIRUB SERPL-MCNC: 1 MG/DL (ref 0.2–1.3)
BUN SERPL-MCNC: 13 MG/DL (ref 7–30)
CALCIUM SERPL-MCNC: 9 MG/DL (ref 8.5–10.1)
CHLORIDE BLD-SCNC: 108 MMOL/L (ref 94–109)
CHOLEST SERPL-MCNC: 209 MG/DL
CO2 SERPL-SCNC: 29 MMOL/L (ref 20–32)
CREAT SERPL-MCNC: 0.66 MG/DL (ref 0.52–1.04)
ERYTHROCYTE [DISTWIDTH] IN BLOOD BY AUTOMATED COUNT: 11.9 % (ref 10–15)
FASTING STATUS PATIENT QL REPORTED: YES
GFR SERPL CREATININE-BSD FRML MDRD: >90 ML/MIN/1.73M2
GLUCOSE BLD-MCNC: 96 MG/DL (ref 70–99)
HCT VFR BLD AUTO: 40.6 % (ref 35–47)
HCV AB SERPL QL IA: NONREACTIVE
HDLC SERPL-MCNC: 91 MG/DL
HGB BLD-MCNC: 13.8 G/DL (ref 11.7–15.7)
LDLC SERPL CALC-MCNC: 105 MG/DL
MCH RBC QN AUTO: 31.9 PG (ref 26.5–33)
MCHC RBC AUTO-ENTMCNC: 34 G/DL (ref 31.5–36.5)
MCV RBC AUTO: 94 FL (ref 78–100)
NONHDLC SERPL-MCNC: 118 MG/DL
PLATELET # BLD AUTO: 219 10E3/UL (ref 150–450)
POTASSIUM BLD-SCNC: 4.2 MMOL/L (ref 3.4–5.3)
PROT SERPL-MCNC: 7 G/DL (ref 6.8–8.8)
RBC # BLD AUTO: 4.32 10E6/UL (ref 3.8–5.2)
SODIUM SERPL-SCNC: 141 MMOL/L (ref 133–144)
T4 FREE SERPL-MCNC: 0.87 NG/DL (ref 0.76–1.46)
TRIGL SERPL-MCNC: 67 MG/DL
TSH SERPL DL<=0.005 MIU/L-ACNC: 4.42 MU/L (ref 0.4–4)
WBC # BLD AUTO: 5.2 10E3/UL (ref 4–11)

## 2023-03-29 PROCEDURE — 86803 HEPATITIS C AB TEST: CPT

## 2023-03-29 PROCEDURE — 84439 ASSAY OF FREE THYROXINE: CPT

## 2023-03-29 PROCEDURE — 36415 COLL VENOUS BLD VENIPUNCTURE: CPT

## 2023-03-29 PROCEDURE — 85027 COMPLETE CBC AUTOMATED: CPT

## 2023-03-29 PROCEDURE — 80061 LIPID PANEL: CPT

## 2023-03-29 PROCEDURE — 80053 COMPREHEN METABOLIC PANEL: CPT

## 2023-03-29 PROCEDURE — 84443 ASSAY THYROID STIM HORMONE: CPT

## 2023-03-29 RX ORDER — LEVOTHYROXINE SODIUM 125 UG/1
125 TABLET ORAL DAILY
Qty: 90 TABLET | Refills: 3 | Status: SHIPPED | OUTPATIENT
Start: 2023-03-29 | End: 2023-07-10

## 2023-03-29 NOTE — TELEPHONE ENCOUNTER
Labs completed.  Refill sent.  Borderline elevated TSH - recheck in 6 months will be recommended unless she has missed recent doses.     MAX

## 2023-04-02 NOTE — RESULT ENCOUNTER NOTE
Your thyroid testing shows normal active thyroid hormone.  A refill of medication was sent in for you on the 29th.   Your cholesterol is similar to previous.  Over all risk for heart disease remains low.  Healthy diet and exercise is recommended to decrease risk for heart disease in the future.  Hepatitis C screening is negative.  Your blood sugar, liver testing and kidney function are all normal.  Your blood cell counts are normal.  Please call or MyChart message me if you have any questions.      MAX

## 2023-05-16 ENCOUNTER — MYC REFILL (OUTPATIENT)
Dept: FAMILY MEDICINE | Facility: CLINIC | Age: 58
End: 2023-05-16
Payer: COMMERCIAL

## 2023-05-16 DIAGNOSIS — F90.2 ATTENTION DEFICIT HYPERACTIVITY DISORDER (ADHD), COMBINED TYPE: ICD-10-CM

## 2023-05-16 DIAGNOSIS — F98.8 ATTENTION DEFICIT DISORDER (ADD) WITHOUT HYPERACTIVITY: ICD-10-CM

## 2023-05-17 RX ORDER — DEXTROAMPHETAMINE SACCHARATE, AMPHETAMINE ASPARTATE, DEXTROAMPHETAMINE SULFATE AND AMPHETAMINE SULFATE 2.5; 2.5; 2.5; 2.5 MG/1; MG/1; MG/1; MG/1
10 TABLET ORAL DAILY
Qty: 30 TABLET | Refills: 0 | Status: SHIPPED | OUTPATIENT
Start: 2023-05-17 | End: 2023-09-22

## 2023-05-17 RX ORDER — DEXTROAMPHETAMINE SACCHARATE, AMPHETAMINE ASPARTATE MONOHYDRATE, DEXTROAMPHETAMINE SULFATE AND AMPHETAMINE SULFATE 5; 5; 5; 5 MG/1; MG/1; MG/1; MG/1
20 CAPSULE, EXTENDED RELEASE ORAL DAILY
Qty: 30 CAPSULE | Refills: 0 | Status: SHIPPED | OUTPATIENT
Start: 2023-05-17 | End: 2023-06-18

## 2023-05-17 NOTE — TELEPHONE ENCOUNTER
Has upcoming appointment with PCP in 2 months.   [facility]   [facility]18/23  Med refilled - further refills  Per PCP   Patient notifed via [employer]   Last visit virtual 7/20/22

## 2023-06-10 ENCOUNTER — HEALTH MAINTENANCE LETTER (OUTPATIENT)
Age: 58
End: 2023-06-10

## 2023-06-18 ENCOUNTER — MYC REFILL (OUTPATIENT)
Dept: FAMILY MEDICINE | Facility: CLINIC | Age: 58
End: 2023-06-18
Payer: COMMERCIAL

## 2023-06-18 DIAGNOSIS — F90.2 ATTENTION DEFICIT HYPERACTIVITY DISORDER (ADHD), COMBINED TYPE: ICD-10-CM

## 2023-06-19 RX ORDER — DEXTROAMPHETAMINE SACCHARATE, AMPHETAMINE ASPARTATE MONOHYDRATE, DEXTROAMPHETAMINE SULFATE AND AMPHETAMINE SULFATE 5; 5; 5; 5 MG/1; MG/1; MG/1; MG/1
20 CAPSULE, EXTENDED RELEASE ORAL DAILY
Qty: 30 CAPSULE | Refills: 0 | Status: SHIPPED | OUTPATIENT
Start: 2023-06-19 | End: 2023-09-05

## 2023-06-19 NOTE — TELEPHONE ENCOUNTER
Refill sent.  Follow up needed for additional refills - message on script.   Has appointment scheduled for July.    MAX

## 2023-07-10 ENCOUNTER — OFFICE VISIT (OUTPATIENT)
Dept: FAMILY MEDICINE | Facility: CLINIC | Age: 58
End: 2023-07-10
Payer: COMMERCIAL

## 2023-07-10 ENCOUNTER — ANCILLARY PROCEDURE (OUTPATIENT)
Dept: GENERAL RADIOLOGY | Facility: CLINIC | Age: 58
End: 2023-07-10
Attending: FAMILY MEDICINE
Payer: COMMERCIAL

## 2023-07-10 VITALS
WEIGHT: 188.9 LBS | HEART RATE: 73 BPM | BODY MASS INDEX: 30.36 KG/M2 | SYSTOLIC BLOOD PRESSURE: 139 MMHG | OXYGEN SATURATION: 100 % | DIASTOLIC BLOOD PRESSURE: 85 MMHG | HEIGHT: 66 IN | RESPIRATION RATE: 16 BRPM | TEMPERATURE: 98.6 F

## 2023-07-10 DIAGNOSIS — E03.4 HYPOTHYROIDISM DUE TO ACQUIRED ATROPHY OF THYROID: ICD-10-CM

## 2023-07-10 DIAGNOSIS — F33.1 MODERATE EPISODE OF RECURRENT MAJOR DEPRESSIVE DISORDER (H): ICD-10-CM

## 2023-07-10 DIAGNOSIS — L30.9 ECZEMA OF BOTH HANDS: ICD-10-CM

## 2023-07-10 DIAGNOSIS — F43.22 ADJUSTMENT DISORDER WITH ANXIETY: ICD-10-CM

## 2023-07-10 DIAGNOSIS — M79.644 PAIN OF FINGER OF RIGHT HAND: ICD-10-CM

## 2023-07-10 DIAGNOSIS — Z00.00 ROUTINE GENERAL MEDICAL EXAMINATION AT A HEALTH CARE FACILITY: Primary | ICD-10-CM

## 2023-07-10 DIAGNOSIS — Z12.31 ENCOUNTER FOR SCREENING MAMMOGRAM FOR BREAST CANCER: ICD-10-CM

## 2023-07-10 DIAGNOSIS — Z12.4 CERVICAL CANCER SCREENING: ICD-10-CM

## 2023-07-10 PROCEDURE — G0124 SCREEN C/V THIN LAYER BY MD: HCPCS | Performed by: PATHOLOGY

## 2023-07-10 PROCEDURE — 99213 OFFICE O/P EST LOW 20 MIN: CPT | Mod: 25 | Performed by: FAMILY MEDICINE

## 2023-07-10 PROCEDURE — 73130 X-RAY EXAM OF HAND: CPT | Mod: TC | Performed by: RADIOLOGY

## 2023-07-10 PROCEDURE — 99396 PREV VISIT EST AGE 40-64: CPT | Performed by: FAMILY MEDICINE

## 2023-07-10 PROCEDURE — G0145 SCR C/V CYTO,THINLAYER,RESCR: HCPCS | Performed by: FAMILY MEDICINE

## 2023-07-10 PROCEDURE — 87624 HPV HI-RISK TYP POOLED RSLT: CPT | Performed by: FAMILY MEDICINE

## 2023-07-10 RX ORDER — QUETIAPINE FUMARATE 25 MG/1
25 TABLET, FILM COATED ORAL AT BEDTIME
Qty: 30 TABLET | Refills: 1 | Status: SHIPPED | OUTPATIENT
Start: 2023-07-10 | End: 2023-10-09

## 2023-07-10 RX ORDER — FLUOXETINE 40 MG/1
40 CAPSULE ORAL DAILY
Qty: 90 CAPSULE | Refills: 1 | Status: SHIPPED | OUTPATIENT
Start: 2023-07-10 | End: 2023-08-14

## 2023-07-10 RX ORDER — LEVOTHYROXINE SODIUM 125 UG/1
125 TABLET ORAL DAILY
Qty: 90 TABLET | Refills: 2 | Status: SHIPPED | OUTPATIENT
Start: 2023-07-10 | End: 2024-02-26

## 2023-07-10 ASSESSMENT — ENCOUNTER SYMPTOMS
MYALGIAS: 0
DIARRHEA: 0
SHORTNESS OF BREATH: 0
JOINT SWELLING: 0
NAUSEA: 0
COUGH: 0
ARTHRALGIAS: 0
HEADACHES: 0
HEMATURIA: 0
PARESTHESIAS: 1
CHILLS: 0
SORE THROAT: 0
DYSURIA: 0
PALPITATIONS: 0
CONSTIPATION: 0
HEMATOCHEZIA: 0
DIZZINESS: 0
HEARTBURN: 0
FEVER: 0
WEAKNESS: 0
EYE PAIN: 0
BREAST MASS: 0
ABDOMINAL PAIN: 0
FREQUENCY: 0
NERVOUS/ANXIOUS: 1

## 2023-07-10 ASSESSMENT — PATIENT HEALTH QUESTIONNAIRE - PHQ9
SUM OF ALL RESPONSES TO PHQ QUESTIONS 1-9: 13
SUM OF ALL RESPONSES TO PHQ QUESTIONS 1-9: 13
5. POOR APPETITE OR OVEREATING: NEARLY EVERY DAY
10. IF YOU CHECKED OFF ANY PROBLEMS, HOW DIFFICULT HAVE THESE PROBLEMS MADE IT FOR YOU TO DO YOUR WORK, TAKE CARE OF THINGS AT HOME, OR GET ALONG WITH OTHER PEOPLE: SOMEWHAT DIFFICULT

## 2023-07-10 ASSESSMENT — ANXIETY QUESTIONNAIRES
3. WORRYING TOO MUCH ABOUT DIFFERENT THINGS: MORE THAN HALF THE DAYS
IF YOU CHECKED OFF ANY PROBLEMS ON THIS QUESTIONNAIRE, HOW DIFFICULT HAVE THESE PROBLEMS MADE IT FOR YOU TO DO YOUR WORK, TAKE CARE OF THINGS AT HOME, OR GET ALONG WITH OTHER PEOPLE: VERY DIFFICULT
1. FEELING NERVOUS, ANXIOUS, OR ON EDGE: NEARLY EVERY DAY
7. FEELING AFRAID AS IF SOMETHING AWFUL MIGHT HAPPEN: MORE THAN HALF THE DAYS
2. NOT BEING ABLE TO STOP OR CONTROL WORRYING: MORE THAN HALF THE DAYS
5. BEING SO RESTLESS THAT IT IS HARD TO SIT STILL: NEARLY EVERY DAY
GAD7 TOTAL SCORE: 18
6. BECOMING EASILY ANNOYED OR IRRITABLE: NEARLY EVERY DAY

## 2023-07-10 NOTE — PROGRESS NOTES
Answers for HPI/ROS submitted by the patient on 7/10/2023  If you checked off any problems, how difficult have these problems made it for you to do your work, take care of things at home, or get along with other people?: Somewhat difficult  PHQ9 TOTAL SCORE: 13    Answers for HPI/ROS submitted by the patient on 7/10/2023  If you checked off any problems, how difficult have these problems made it for you to do your work, take care of things at home, or get along with other people?: Somewhat difficult  PHQ9 TOTAL SCORE: 13       SUBJECTIVE:   CC: ANJELICA is an 57 year old who presents for preventive health visit.       Healthy Habits:     Getting at least 3 servings of Calcium per day:  Yes    Bi-annual eye exam:  Yes    Dental care twice a year:  NO    Sleep apnea or symptoms of sleep apnea:  None    Diet:  Regular (no restrictions)    Frequency of exercise:  1 day/week    Duration of exercise:  Less than 15 minutes    Taking medications regularly:  No    Barriers to taking medications:  Cost of medication and Problems remembering to take them    Medication side effects:  None    Additional concerns today:  Yes      Riding bike, walking  - tolerated well.         Depression and Anxiety Follow-Up    How are you doing with your depression since your last visit? Worsened     How are you doing with your anxiety since your last visit?  Worsened     Are you having other symptoms that might be associated with depression or anxiety? No    Have you had a significant life event? Relationship Concerns     Do you have any concerns with your use of alcohol or other drugs? Yes:  Previously consuming daily alcohol but has stopped that over the last 2 weeks.  Feels imbalance, picking nails, compulsive, over-focused at times.  Consumed.    Social History     Tobacco Use     Smoking status: Never     Smokeless tobacco: Never   Substance Use Topics     Alcohol use: Yes     Comment: INFREQUENTLY     Drug use: No         3/10/2021     8:12  AM 7/1/2022     9:32 AM 7/10/2023     9:55 AM   PHQ   PHQ-9 Total Score 10 9    9 13   Q9: Thoughts of better off dead/self-harm past 2 weeks Not at all Not at all    Not at all More than half the days   F/U: Thoughts of suicide or self-harm   Yes   F/U: Self harm-plan   No   F/U: Self-harm action   No   F/U: Safety concerns   No         3/10/2021     8:12 AM 7/1/2022     9:35 AM 7/20/2022     7:55 AM   GALE-7 SCORE   Total Score  12 (moderate anxiety) 12 (moderate anxiety)   Total Score 18 12    12 12               Follow Up Actions Taken  Crisis resource information provided in the After Visit Summary  Mental Health Referral placed     Discussed the following ways the patient can remain in a safe environment:  remove alcohol and be around others  Suicide Assessment Five-step Evaluation and Treatment (SAFE-T)    Hypothyroidism Follow-up      Since last visit, patient describes the following symptoms: Weight stable, no hair loss, no skin changes, no constipation, no loose stools    ADHD:  adderall expensive.  XR works better but more expensive.  Off for month of July.  Using 10 mg short acting       Social History     Tobacco Use     Smoking status: Never     Smokeless tobacco: Never   Substance Use Topics     Alcohol use: Yes     Comment: INFREQUENTLY             7/10/2023     9:54 AM   Alcohol Use   Prescreen: >3 drinks/day or >7 drinks/week? Yes   AUDIT SCORE  9     Reviewed orders with patient.  Reviewed health maintenance and updated orders accordingly - Yes  BP Readings from Last 3 Encounters:   07/10/23 139/85   03/10/21 132/84   08/19/19 131/83    Wt Readings from Last 3 Encounters:   07/10/23 85.7 kg (188 lb 14.4 oz)   03/10/21 77.7 kg (171 lb 6.4 oz)   08/19/19 89.3 kg (196 lb 12.8 oz)                    Breast Cancer Screening:  Any new diagnosis of family breast, ovarian, or bowel cancer? No    FHS-7:       7/10/2023     9:56 AM   Breast CA Risk Assessment (FHS-7)   Did any of your first-degree relatives  have breast or ovarian cancer? No   Did any of your relatives have bilateral breast cancer? Unknown   Did any man in your family have breast cancer? No   Did any woman in your family have breast and ovarian cancer? No   Did any woman in your family have breast cancer before age 50 y? No   Do you have 2 or more relatives with breast and/or ovarian cancer? Yes   Do you have 2 or more relatives with breast and/or bowel cancer? No       Mammogram Screening: Recommended mammography every 1-2 years with patient discussion and risk factor consideration  Pertinent mammograms are reviewed under the imaging tab.    History of abnormal Pap smear: NO - age 30-65 PAP every 5 years with negative HPV co-testing recommended      Latest Ref Rng & Units 1/22/2019     8:16 AM 1/22/2019     8:08 AM 6/2/2016    12:00 AM   PAP / HPV   PAP (Historical)   ASC-US  NIL    HPV 16 DNA NEG^Negative Negative      HPV 18 DNA NEG^Negative Negative      Other HR HPV NEG^Negative Negative        Reviewed and updated as needed this visit by clinical staff   Tobacco  Allergies  Meds   Med Hx  Surg Hx  Fam Hx          Reviewed and updated as needed this visit by Provider   Tobacco  Allergies  Meds   Med Hx  Surg Hx  Fam Hx         Past Medical History:   Diagnosis Date     Abnormal Pap smear of cervix 01/22/2019    see problem list     Attention deficit disorder with hyperactivity(314.01)      Major depressive disorder, recurrent episode, unspecified      Morbid obesity (H)      Nontoxic uninodular goiter      Other malaise and fatigue      Other malaise and fatigue      Plantar fascial fibromatosis      Thyroid nodule       Past Surgical History:   Procedure Laterality Date     BIOPSY  1989    benign nodule on my thyroid     [facility]OLOG[facility]      stab avulsion removal of symptomatic varicose veins     COLONOSCOPY N/A 11/12/2018    Procedure: COMBINED COLONOSCOPY, SINGLE OR MULTIPLE BIOPSY/POLYPECTOMY BY BIOPSY;  Surgeon: Brooke  "Darryn Olivas MD;  Location: [facility] OR     COLONOSCOPY WITH CO2 INSUFFLATION N/A 2018    Procedure: COLONOSCOPY WITH CO2 INSUFFLATION;  Surgeon: Darryn Cox MD;  Location: [facility] OR     VASCULAR SURGERY      stripped vein in leg     OB History    Para Term  AB Living   1 1 0 0 0 1   SAB IAB Ectopic Multiple Live Births   0 0 0 0 1      # Outcome Date GA Lbr Fawad/2nd Weight Sex Delivery Anes PTL Lv   1 Para     M    PAT       Review of Systems   Constitutional: Negative for chills and fever.   HENT: Negative for congestion, ear pain, hearing loss and sore throat.    Eyes: Negative for pain and visual disturbance.   Respiratory: Negative for cough and shortness of breath.    Cardiovascular: Negative for chest pain, palpitations and peripheral edema.   Gastrointestinal: Negative for abdominal pain, constipation, diarrhea, heartburn, hematochezia and nausea.   Breasts:  Negative for tenderness, breast mass and discharge.   Genitourinary: Negative for dysuria, frequency, genital sores, hematuria, pelvic pain, urgency, vaginal bleeding and vaginal discharge.   Musculoskeletal: Negative for arthralgias, joint swelling and myalgias.   Skin: Negative for rash.   Neurological: Positive for paresthesias. Negative for dizziness, weakness and headaches.   Psychiatric/Behavioral: Positive for mood changes. The patient is nervous/anxious.      Alcohol use every day during COVID - stress triggering.    Hand eczema - not controlled with temovate - costly prescription.    Mood symptoms not controlled with current treatment.  Feels like she her mood is unstable       OBJECTIVE:   /85 (BP Location: Right arm, Patient Position: Sitting, Cuff Size: Adult Large)   Pulse 73   Temp 98.6  F (37  C) (Oral)   Resp 16   Ht 1.67 m (5' 5.75\")   Wt 85.7 kg (188 lb 14.4 oz)   SpO2 100%   BMI 30.72 kg/m    Physical Exam  GENERAL APPEARANCE: alert, no distress and obese  EYES: Eyes grossly normal to " inspection, PERRL and conjunctivae and sclerae normal  HENT: ear canals and TM's normal, nose and mouth without ulcers or lesions, oropharynx clear and oral mucous membranes moist  NECK: no adenopathy, no asymmetry, masses, or scars and thyroid normal to palpation  RESP: lungs clear to auscultation - no rales, rhonchi or wheezes  BREAST: normal without masses, tenderness or nipple discharge and no palpable axillary masses or adenopathy  CV: regular rate and rhythm, normal S1 S2, no S3 or S4, no murmur, click or rub, no peripheral edema and peripheral pulses strong  ABDOMEN: soft, nontender, no hepatosplenomegaly, no masses and bowel sounds normal   (female): normal female external genitalia, normal urethral meatus, vaginal mucosal atrophy noted, normal cervix, adnexae, and uterus without masses or abnormal discharge  MS: no musculoskeletal defects are noted and gait is age appropriate without ataxia.  Significant joint enlargement DIP joints of the second fingers bilaterally.  Tenderness to palpation.  SKIN: Cracking eczematous type changes of the palms bilaterally.  No significant scaling noted.  No erythema noted.  NEURO: Normal strength and tone, sensory exam grossly normal, mentation intact and speech normal  PSYCH: mentation appears normal, well groomed, judgement and insight intact, affect flat and anxious    Diagnostic Test Results:  Labs reviewed in Epic    Results for orders placed or performed in visit on 07/10/23   XR Hand Bilateral G/E 3 Views     Status: None    Narrative    XR BILATERAL HAND THREE OR MORE VIEWS   7/10/2023 11:31 AM     HISTORY:  Pain of finger of right hand    Comparison: None.      Impression    IMPRESSION:    Right: Severe osteoarthrosis of the first CMC joint. Severe erosive  osteoarthrosis of the DIP joint of the index finger. Mild  osteoarthrosis of several of the other interphalangeal joints.    Left: Severe osteoarthrosis of the first CMC joint. Severe erosive  osteoarthrosis  of the DIP joint of the index finger. Mild  osteoarthrosis of several of the other interphalangeal joints.    DINA BLOCK MD         SYSTEM ID:  [id number]       ASSESSMENT/PLAN:   (Z00.00) Routine general medical examination at a health care facility  (primary encounter diagnosis)  Comment: Reviewed labs performed in March.  Plan: Screening and preventive care discussed.  Mammogram recommended.  Immunizations declined.    (E03.4) Hypothyroidism due to acquired atrophy of thyroid  Comment: Euthyroid on recent testing normal free T4.  She reports she has been off the medicine for a while which likely contributes to the elevated TSH.  Plan: levothyroxine (SYNTHROID/LEVOTHROID) 125 MCG         tablet        Continues on the current dose of levothyroxine.  We will reassess her thyroid function in the spring.    (F43.22) Adjustment disorder with anxiety/(F33.1) Moderate episode of recurrent major depressive disorder (H)  Comment:.  There is some situational components with her divorce during COVID.  Is not coping well.  Has recently discontinued alcohol use.  Plan: FLUoxetine (PROZAC) 40 MG capsule, Adult Mental        Health  Referral, Adult Mental Health          Referral, QUEtiapine (SEROQUEL) 25 MG        tablet        Referral for therapy evaluation as well as collaborative care psychiatry evaluation for recommendations regarding her medication.  Currently she will continue on fluoxetine at 40 mg daily and begin a low-dose of quetiapine at bedtime for mood stabilization.  She will follow-up if worsening symptoms prior to these appointments.  Follow-up in 4 to 6 weeks after starting the quetiapine unless she is seeing psychiatry by that time.    (M79.644) Pain of finger of right hand  Comment: Hand pain primarily in the second digit DIP.  Plan: XR Hand Bilateral G/E 3 Views,       destructive changes at the DIP joints bilaterally noted.  Will review results with patient since they have  returned and discussed evaluation either with hand specialist or additional lab testing for inflammatory work-up and rheumatology evaluation.    (Z12.31) Encounter for screening mammogram for breast cancer  Comment: Mammogram due  Plan: MA Screen Bilateral w/Carrillo            (Z12.4) Cervical cancer screening  Comment: Screening today  Plan: Pap Screen with HPV - recommended age 30 - 65         years, HPV Hold (Lab Only)            (L30.9) Eczema of both hands  Comment: Significant eczematous changes in the hands.  Question pitting in the nails.  No other psoriatic lesions on the skin noted.  Plan: Adult Dermatology Referral        Evaluation with dermatology for additional evaluation and treatment as recommended.  Avoiding chemical or significant water exposure to the hands recommended.  Continue use of Temovate until Derm evaluation.      Patient has been advised of split billing requirements and indicates understanding: Yes      COUNSELING:  Reviewed preventive health counseling, as reflected in patient instructions       Regular exercise       Healthy diet/nutrition       Vision screening       Immunizations    Declined: Covid-19 and Zoster due to Concerns about side effects/safety               Osteoporosis prevention/bone health        She reports that she has never smoked. She has never used smokeless tobacco.      Esther Armstrong MD  [facility]      Patient Instructions   PAP today.  Mammogram ordered.   You can call [phone] to schedule this at the [facility] building in [place]     Xray today - trial of diclofenac gel (Voltaren) to hands up to 4 times a day for pain and swelling.      Continue fluoxetine.  Add Seroquel.  Follow up in 4-6 weeks unless into Psychiatrist before that time.  Referral therapy and psychiatrist.     Referral to dermatology for the hand eczema symptoms.    Wear gloves when cleaning or submerging hands in water.                 This chart was documented  by provider using a voice activated software called Dragon in addition to manual typing. There may be vocabulary errors or other grammatical errors due to this.

## 2023-07-10 NOTE — PATIENT INSTRUCTIONS
PAP today.  Mammogram ordered.   You can call [phone] to schedule this at the [facility] building in [place]     Xray today - trial of diclofenac gel (Voltaren) to hands up to 4 times a day for pain and swelling.      Continue fluoxetine.  Add Seroquel.  Follow up in 4-6 weeks unless into Psychiatrist before that time.  Referral therapy and psychiatrist.     Referral to dermatology for the hand eczema symptoms.    Wear gloves when cleaning or submerging hands in water.           Preventive Health Recommendations  Female Ages 50 - 64    Yearly exam: See your health care provider every year in order to  Review health changes.   Discuss preventive care.    Review your medicines if your doctor has prescribed any.    Get a Pap test every three years (unless you have an abnormal result and your provider advises testing more often).  If you get Pap tests with HPV test, you only need to test every 5 years, unless you have an abnormal result.   You do not need a Pap test if your uterus was removed (hysterectomy) and you have not had cancer.  You should be tested each year for STDs (sexually transmitted diseases) if you're at risk.   Have a mammogram every 1 to 2 years.  Have a colonoscopy at age 50, or have a yearly FIT test (stool test). These exams screen for colon cancer.    Have a cholesterol test every 5 years, or more often if advised.  Have a diabetes test (fasting glucose) every three years. If you are at risk for diabetes, you should have this test more often.   If you are at risk for osteoporosis (brittle bone disease), think about having a bone density scan (DEXA).    Shots: Get a flu shot each year. Get a tetanus shot every 10 years.    Nutrition:   Eat at least 5 servings of fruits and vegetables each day.  Eat whole-grain bread, whole-wheat pasta and brown rice instead of white grains and rice.  Get adequate Calcium and Vitamin D.     Lifestyle  Exercise at least 150 minutes a week (30 minutes a day, 5  days a week). This will help you control your weight and prevent disease.  Limit alcohol to one drink per day.  No smoking.   Wear sunscreen to prevent skin cancer.   See your dentist every six months for an exam and cleaning.  See your eye doctor every 1 to 2 years.

## 2023-07-11 ASSESSMENT — COLUMBIA-SUICIDE SEVERITY RATING SCALE - C-SSRS
2. IN THE PAST MONTH, HAVE YOU ACTUALLY HAD ANY THOUGHTS OF KILLING YOURSELF?: NO
6. HAVE YOU EVER DONE ANYTHING, STARTED TO DO ANYTHING, OR PREPARED TO DO ANYTHING TO END YOUR LIFE?: NO
1. WITHIN THE PAST MONTH, HAVE YOU WISHED YOU WERE DEAD OR WISHED YOU COULD GO TO SLEEP AND NOT WAKE UP?: YES

## 2023-07-11 ASSESSMENT — ANXIETY QUESTIONNAIRES: GAD7 TOTAL SCORE: 18

## 2023-07-12 ENCOUNTER — MYC MEDICAL ADVICE (OUTPATIENT)
Dept: FAMILY MEDICINE | Facility: CLINIC | Age: 58
End: 2023-07-12
Payer: COMMERCIAL

## 2023-07-12 DIAGNOSIS — M15.1 DEGENERATIVE ARTHRITIS OF DISTAL INTERPHALANGEAL JOINT OF INDEX FINGER OF RIGHT HAND: ICD-10-CM

## 2023-07-12 DIAGNOSIS — M15.4 EROSIVE (OSTEO)ARTHRITIS: Primary | ICD-10-CM

## 2023-07-13 LAB
BKR LAB AP GYN ADEQUACY: ABNORMAL
BKR LAB AP GYN INTERPRETATION: ABNORMAL
BKR LAB AP HPV REFLEX: ABNORMAL
BKR LAB AP PREVIOUS ABNORMAL: ABNORMAL
PATH REPORT.COMMENTS IMP SPEC: ABNORMAL
PATH REPORT.COMMENTS IMP SPEC: ABNORMAL
PATH REPORT.RELEVANT HX SPEC: ABNORMAL

## 2023-07-14 LAB
HUMAN PAPILLOMA VIRUS 16 DNA: NEGATIVE
HUMAN PAPILLOMA VIRUS 18 DNA: NEGATIVE
HUMAN PAPILLOMA VIRUS FINAL DIAGNOSIS: ABNORMAL
HUMAN PAPILLOMA VIRUS OTHER HR: POSITIVE

## 2023-07-17 ENCOUNTER — LAB (OUTPATIENT)
Dept: LAB | Facility: CLINIC | Age: 58
End: 2023-07-17
Payer: COMMERCIAL

## 2023-07-17 ENCOUNTER — PATIENT OUTREACH (OUTPATIENT)
Dept: FAMILY MEDICINE | Facility: CLINIC | Age: 58
End: 2023-07-17
Payer: COMMERCIAL

## 2023-07-17 DIAGNOSIS — M15.4 EROSIVE (OSTEO)ARTHRITIS: ICD-10-CM

## 2023-07-17 LAB
CRP SERPL-MCNC: <3 MG/L
ERYTHROCYTE [SEDIMENTATION RATE] IN BLOOD BY WESTERGREN METHOD: 8 MM/HR (ref 0–30)

## 2023-07-17 PROCEDURE — 85652 RBC SED RATE AUTOMATED: CPT

## 2023-07-17 PROCEDURE — 86431 RHEUMATOID FACTOR QUANT: CPT

## 2023-07-17 PROCEDURE — 86140 C-REACTIVE PROTEIN: CPT

## 2023-07-17 PROCEDURE — 36415 COLL VENOUS BLD VENIPUNCTURE: CPT

## 2023-07-18 LAB — RHEUMATOID FACT SER NEPH-ACNC: <6 IU/ML

## 2023-07-19 NOTE — RESULT ENCOUNTER NOTE
Testing for inflammation is negative/normal.  Proceeding with plan for hand orthopedic evaluation is recommended.   Please call or MyChart message me if you have any questions.      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  Call member services at your health plan with any benefit or coverage questions.  [facility]  will call you to coordinate your care as prescribed by your provider. A representative will call you within 2 business days to help you schedule your appointment, or you may contact the  Representative at: [phone].

## 2023-08-11 ENCOUNTER — VIRTUAL VISIT (OUTPATIENT)
Dept: PSYCHIATRY | Facility: CLINIC | Age: 58
End: 2023-08-11
Payer: COMMERCIAL

## 2023-08-11 DIAGNOSIS — F39 MOOD DISORDER (H): Primary | ICD-10-CM

## 2023-08-11 PROCEDURE — 99205 OFFICE O/P NEW HI 60 MIN: CPT | Mod: VID | Performed by: PSYCHIATRY & NEUROLOGY

## 2023-08-11 RX ORDER — LAMOTRIGINE 25 MG/1
TABLET ORAL
Qty: 42 TABLET | Refills: 0 | Status: SHIPPED | OUTPATIENT
Start: 2023-08-11 | End: 2024-02-09 | Stop reason: DRUGHIGH

## 2023-08-11 RX ORDER — LAMOTRIGINE 100 MG/1
100 TABLET ORAL DAILY
Qty: 30 TABLET | Refills: 0 | Status: SHIPPED | OUTPATIENT
Start: 2023-09-01 | End: 2023-10-02

## 2023-08-11 ASSESSMENT — ANXIETY QUESTIONNAIRES
5. BEING SO RESTLESS THAT IT IS HARD TO SIT STILL: NEARLY EVERY DAY
2. NOT BEING ABLE TO STOP OR CONTROL WORRYING: NEARLY EVERY DAY
1. FEELING NERVOUS, ANXIOUS, OR ON EDGE: NEARLY EVERY DAY
GAD7 TOTAL SCORE: 21
IF YOU CHECKED OFF ANY PROBLEMS ON THIS QUESTIONNAIRE, HOW DIFFICULT HAVE THESE PROBLEMS MADE IT FOR YOU TO DO YOUR WORK, TAKE CARE OF THINGS AT HOME, OR GET ALONG WITH OTHER PEOPLE: EXTREMELY DIFFICULT
GAD7 TOTAL SCORE: 21
3. WORRYING TOO MUCH ABOUT DIFFERENT THINGS: NEARLY EVERY DAY
6. BECOMING EASILY ANNOYED OR IRRITABLE: NEARLY EVERY DAY
4. TROUBLE RELAXING: NEARLY EVERY DAY
7. FEELING AFRAID AS IF SOMETHING AWFUL MIGHT HAPPEN: NEARLY EVERY DAY

## 2023-08-11 ASSESSMENT — PAIN SCALES - GENERAL: PAINLEVEL: NO PAIN (0)

## 2023-08-11 ASSESSMENT — PATIENT HEALTH QUESTIONNAIRE - PHQ9
10. IF YOU CHECKED OFF ANY PROBLEMS, HOW DIFFICULT HAVE THESE PROBLEMS MADE IT FOR YOU TO DO YOUR WORK, TAKE CARE OF THINGS AT HOME, OR GET ALONG WITH OTHER PEOPLE: VERY DIFFICULT
SUM OF ALL RESPONSES TO PHQ QUESTIONS 1-9: 16
SUM OF ALL RESPONSES TO PHQ QUESTIONS 1-9: 16

## 2023-08-11 NOTE — PROGRESS NOTES
"Telemedicine Visit: The patient's condition can be safely assessed and treated via synchronous audio and visual telemedicine encounter.      Reason for Telemedicine Visit: Patient has requested telehealth visit    Originating Site (Patient Location): Patient's home    Distant Location (provider location):  Off-site    Consent:  The patient/guardian has verbally consented to: the potential risks and benefits of telemedicine (video visit) versus in person care; bill my insurance or make self-payment for services provided; and responsibility for payment of non-covered services.     Mode of Communication:  Video Conference via "[employer]    As the provider I attest to compliance with applicable laws and regulations related to telemedicine.                                              Outpatient Psychiatric Evaluation- Standard  Adult    Name:  Brittany Rooney  : 1965    Source of Referral:  Primary Care Provider: Esther Armstrong MD   Current Psychotherapist: None    My Clinical Question Is: mood swings, uncontrolled symptoms wtih current medication.       Identifying Data:  Patient is a 57 year old,   White Not  or  who presents for initial visit with me.  Patient is currently employed full time. Patient attended the phone/video session alone. Patient prefers to be called: \"Veronica\"    Chief Complaint:  Consult       HPI:  Brittany Rooney is a 57 year old with past history including depression, anxiety, ADHD who presents today for psychiatric assessment.     Patient presents today to address some mood regulation struggles.  Patient reports she has \"always had struggles regulating emotionally.\"  Patient historically has diagnoses including depression, anxiety, and ADHD.  Patient is currently taking fluoxetine, Adderall, and quetiapine.  The COVID-19 pandemic has been a difficult time.  Her son has moved back home from college and she also  her  during this time.  " "Finances have been a big stressor.  She currently does not feel like she has been able to manage anything well.  Everything the last 2-3 months \"has fallen apart.\"  In an attempt to tackle her mental health symptoms she discontinued alcohol use as of July 5.  She felt like she had been drinking more than she would prefer to help manage her symptoms.  She also took time off from her Adderall in July.  She was diagnosed 15-18 years ago with ADHD.  Focus continues to be a big struggle, along with inappropriate hyperfocus at times. Pt has wondered about possibility of borderline personality disorder. More hopeless lately. Passive thoughts of death/suicide with no plan or intent to act. Cannabis use but denies cannabis causing any problems.     Psych Meds at Intake:  Adderall XR 20 mg daily   fluoxetine 40 mg daily - \"I don't know if it does anything for me\" increased to 40 mg a year ago, no side effects  Quetiapine 25 mg at bedtime  - on 2 weeks, not sure if feel different     Past Psych Meds:  Sertraline - bad on stomach  Wellbutrin     Past diagnoses include: depression, anxiety, ADHD  Current medications include: has a current medication list which includes the following prescription(s): amphetamine-dextroamphetamine, amphetamine-dextroamphetamine, vitamin d3, clobetasol, fluoxetine, ibuprofen, levothyroxine, mometasone, and quetiapine.   Medication side effects: Denies  Current stressors include: Symptoms and see HPI above  Coping mechanisms and supports include: Family, Hobbies, and Friends    Psychiatric Review of Symptoms:  Depression: Change in sleep, Lack of interest, Excessive or inappropriate guilt, Change in energy level, Difficulties concentrating, Psychomotor slowing or agitation, Suicidal ideation, Feelings of hopelessness, Feelings of helplessness, Low self-worth, Ruminations, Irritability, Feeling sad, down, or depressed, and Withdrawn  Susan:  No Symptoms  Psychosis: No Symptoms  Anxiety: Excessive " worry, Nervousness, Sleep disturbance, Psychomotor agitation, Ruminations, Poor concentration, and Irritability  Panic:  Not discussed  Post Traumatic Stress Disorder:  No Symptoms   Eating Disorder: No Symptoms  ADD / ADHD:  Inattentive and hyperfocus  Conduct Disorder: No symptoms  Autism Spectrum Disorder: No symptoms  Obsessive Compulsive Disorder: No Symptoms    Sleep: sleeping well    All other ROS negative.     PHQ-9 scores:       3/10/2021     8:12 AM 7/1/2022     9:32 AM 7/10/2023     9:55 AM   PHQ-9 SCORE   PHQ-9 Total Score MyChart  9 (Mild depression) 13 (Moderate depression)   PHQ-9 Total Score 10 9    9 13       GALE-7 scores:        7/1/2022     9:35 AM 7/20/2022     7:55 AM 7/10/2023     9:56 AM   GALE-7 SCORE   Total Score 12 (moderate anxiety) 12 (moderate anxiety)    Total Score 12    12 12 18       Medical Review of Systems:  10 systems (general, cardiovascular, respiratory, eyes, ENT, endocrine, GI, , M/S, neurological) were reviewed. Most pertinent finding(s) is/are: denies fever, cough, persistent headaches, shortness of breath, chest pain, severe GI symptoms, trouble urinating, severe pain.  The remaining systems are all unremarkable.    A 12-item WHODAS 2.0 assessment was not completed.    Psychiatric History:   Hospitalizations: Yes (one day in ER): 2021 [facility] after suicide attempt (cutting requiring sutures while intoxicated -ETOH)  History of Commitment? No   Past Treatment: counseling and medication(s) from physician / PCP  Suicide Attempts: Yes 2021 cutting while intoxicated    Current Suicide Risk: Suicide Assessment Completed Today.  Self-injurious Behavior:  cutting while intoxicated 2021 (also kicked leg through double-pane window causing damage requiring sutures  Electroconvulsive Therapy (ECT) or Transcranial Magnetic Stimulation (TMS): No   GeneSight Genetic Testing: No     Past medication trials include but are not limited to:   Psych Meds at Intake:  Adderall XR 20 mg  "daily   fluoxetine 40 mg daily - \"I don't know if it does anything for me\" increased to 40 mg a year ago, no side effects  Quetiapine 25 mg at bedtime  - on 2 weeks, not sure if feel different     Past Psych Meds:  Sertraline - bad on stomach  Wellbutrin     Substance Use History:  Current Use of Drugs/Alcohol: Denies alcohol (no ETOH since July 5th); current cannabis use but denies problematic use  Past Use of Drugs/Alcohol: hx of alcohol dependence  Patient reported the following problems as a result of drinking: relationship problems.   Patient has not received chemical dependency treatment in the past  Recovery Programming Involvement: None    Tobacco use: No    Continue to monitor.   Discussed effect of substance use on overall health.     Past Medical History:  Past Medical History:   Diagnosis Date    Abnormal Pap smear of cervix 01/22/2019    see problem list    Attention deficit disorder with hyperactivity(314.01)     Major depressive disorder, recurrent episode, unspecified     Morbid obesity (H)     Nontoxic uninodular goiter     Other malaise and fatigue     Other malaise and fatigue     Plantar fascial fibromatosis     Thyroid nodule       Surgery:   Past Surgical History:   Procedure Laterality Date    BIOPSY  1989    benign nodule on my thyroid    [facility]      stab avulsion removal of symptomatic varicose veins    COLONOSCOPY N/A 11/12/2018    Procedure: COMBINED COLONOSCOPY, SINGLE OR MULTIPLE BIOPSY/POLYPECTOMY BY BIOPSY;  Surgeon: Darryn Cox MD;  Location: [facility] OR    COLONOSCOPY WITH CO2 INSUFFLATION N/A 11/12/2018    Procedure: COLONOSCOPY WITH CO2 INSUFFLATION;  Surgeon: Darryn Cox MD;  Location: [facility] OR    VASCULAR SURGERY      stripped vein in leg     Food and Medicine Allergies:     Allergies   Allergen Reactions    Zoloft [Sertraline Hcl] Diarrhea and Cramps    Lobster [Crustaceans]     Penicillin V Potassium Rash     fever    Ultram [Tramadol] " Palpitations     Seizures or Head Injury: No  Diet: not discussed  Exercise: not discussed  Supplements: Reviewed per Electronic Medical Record Today    Current Medications:    Current Outpatient Medications:     amphetamine-dextroamphetamine (ADDERALL XR) 20 MG 24 hr capsule, Take 1 capsule (20 mg) by mouth daily +++ appointment needed for additional refills +++, Disp: 30 capsule, Rfl: 0    amphetamine-dextroamphetamine (ADDERALL) 10 MG tablet, Take 1 tablet (10 mg) by mouth daily By 3 pm if needed for evening activites.   +++ appointment needed for additional refills +++, Disp: 30 tablet, Rfl: 0    Cholecalciferol (VITAMIN D) 2000 UNIT tablet, Take 1 tablet by mouth 2 times daily., Disp: 100 tablet, Rfl: 6    clobetasol (TEMOVATE) 0.05 % external ointment, APPLY TOPICALLY TO THE AFFECTED AREA TWICE DAILY (Patient not taking: Reported on 7/10/2023), Disp: 30 g, Rfl: 0    FLUoxetine (PROZAC) 40 MG capsule, Take 1 capsule (40 mg) by mouth daily, Disp: 90 capsule, Rfl: 1    IBUPROFEN 200 MG OR CAPS, 2 CAPSULE EVERY 4 TO 6 HOURS AS NEEDED, Disp: , Rfl:     levothyroxine (SYNTHROID/LEVOTHROID) 125 MCG tablet, Take 1 tablet (125 mcg) by mouth daily, Disp: 90 tablet, Rfl: 2    mometasone (ELOCON) 0.1 % external cream, Apply  topically as needed., Disp: 30 g, Rfl: 0    QUEtiapine (SEROQUEL) 25 MG tablet, Take 1 tablet (25 mg) by mouth At Bedtime, Disp: 30 tablet, Rfl: 1    The [place] Prescription Monitoring Program has been reviewed and there are no concerns about diversionary activity for controlled substances at this time.    07/31/2023 06/19/2023 1 Dextroamp-Amphet Er 20 Mg Cap 30.00 30 Pa Kol [id number] Wal (1802) 0/0  [place]   07/31/2023 04/18/2023 1 Dextroamp-Amphetamin 10 Mg Tab 30.00 30 Pa Kol [id number] [facility] (1802[place]   05/17/2023 05/17/2023 1 Dextroamp-Amphetamin 10 Mg Tab 30.00 30  Maria E [id number] [facility] (1802) 0/[place]   05/17/2023 05/17/2023 1 Dextroamp-Amphet Er 20 Mg Cap 30.00 30  Maria E  [id number] Wal (1802) 0/0  [place]   04/18/2023 04/18/2023 1 Dextroamp-Amphet Er 20 Mg Cap 30.00 30 Pa Kol [id number] Wal (1802) 0/0  [place]   04/17/2023 01/18/2023 1 Dextroamp-Amphetamin 10 Mg Tab 30.00 30 Pa Victor Manuel [id number] Wal (1802) 0/0  [place]       Vital Signs:  None since this is a phone/video visit.     Labs:  Most recent laboratory results reviewed and the pertinent results include:   Lab on 07/17/2023   Component Date Value Ref Range Status    Erythrocyte Sedimentation Rate 07/17/2023 8  0 - 30 mm/hr Final    CRP Inflammation 07/17/2023 <3.00  <5.00 mg/L Final    Rheumatoid Factor 07/17/2023 <6  <12 IU/mL Final   Office Visit on 07/10/2023   Component Date Value Ref Range Status    Interpretation 07/10/2023 Low-grade squamous intraepithelial lesion (LSIL) encompassing HPV/mild dysplasia/CIN1 (A)    Final    Comment 07/10/2023    Final                    Value:This result contains rich text formatting which cannot be displayed here.    Specimen Adequacy 07/10/2023 Satisfactory for evaluation, endocervical/transformation zone component present   Final    Clinical Information 07/10/2023    Final                    Value:This result contains rich text formatting which cannot be displayed here.    Reflex Testing 07/10/2023 Yes regardless of result   Final    Previous Abnormal? 07/10/2023    Final                    Value:This result contains rich text formatting which cannot be displayed here.    Performing Labs 07/10/2023    Final                    Value:This result contains rich text formatting which cannot be displayed here.    Other HR HPV 07/10/2023 Positive (A)  Negative Final    HPV16 DNA 07/10/2023 Negative  Negative Final    HPV18 DNA 07/10/2023 Negative  Negative Final    FINAL DIAGNOSIS 07/10/2023    Final                    Value:This result contains rich text formatting which cannot be displayed here.   Lab on 03/29/2023   Component Date Value Ref Range Status    Hepatitis C Antibody  03/29/2023 Nonreactive  Nonreactive Final    TSH 03/29/2023 4.42 (H)  0.40 - 4.00 mU/L Final    Sodium 03/29/2023 141  133 - 144 mmol/L Final    Potassium 03/29/2023 4.2  3.4 - 5.3 mmol/L Final    Chloride 03/29/2023 108  94 - 109 mmol/L Final    Carbon Dioxide (CO2) 03/29/2023 29  20 - 32 mmol/L Final    Anion Gap 03/29/2023 4  3 - 14 mmol/L Final    Urea Nitrogen 03/29/2023 13  7 - 30 mg/dL Final    Creatinine 03/29/2023 0.66  0.52 - 1.04 mg/dL Final    Calcium 03/29/2023 9.0  8.5 - 10.1 mg/dL Final    Glucose 03/29/2023 96  70 - 99 mg/dL Final    Alkaline Phosphatase 03/29/2023 75  40 - 150 U/L Final    AST 03/29/2023 18  0 - 45 U/L Final    ALT 03/29/2023 24  0 - 50 U/L Final    Protein Total 03/29/2023 7.0  6.8 - 8.8 g/dL Final    Albumin 03/29/2023 3.8  3.4 - 5.0 g/dL Final    Bilirubin Total 03/29/2023 1.0  0.2 - 1.3 mg/dL Final    GFR Estimate 03/29/2023 >90  >60 mL/min/1.73m2 Final    eGFR calculated using 2021 CKD-EPI equation.    Cholesterol 03/29/2023 209 (H)  <200 mg/dL Final    Triglycerides 03/29/2023 67  <150 mg/dL Final    Direct Measure HDL 03/29/2023 91  >=50 mg/dL Final    LDL Cholesterol Calculated 03/29/2023 105 (H)  <=100 mg/dL Final    Non HDL Cholesterol 03/29/2023 118  <130 mg/dL Final    Patient Fasting > 8hrs? 03/29/2023 Yes   Final    WBC Count 03/29/2023 5.2  4.0 - 11.0 10e3/uL Final    RBC Count 03/29/2023 4.32  3.80 - 5.20 10e6/uL Final    Hemoglobin 03/29/2023 13.8  11.7 - 15.7 g/dL Final    Hematocrit 03/29/2023 40.6  35.0 - 47.0 % Final    MCV 03/29/2023 94  78 - 100 fL Final    MCH 03/29/2023 31.9  26.5 - 33.0 pg Final    MCHC 03/29/2023 34.0  31.5 - 36.5 g/dL Final    RDW 03/29/2023 11.9  10.0 - 15.0 % Final    Platelet Count 03/29/2023 219  150 - 450 10e3/uL Final    Free T4 03/29/2023 0.87  0.76 - 1.46 ng/dL Final     No EKG on file.     Family History:   Patient reported family history includes:   Family History   Problem Relation Age of Onset    Coronary Artery Disease Mother  "    Heart Disease Mother         ARRHYTHMIA    Arthritis Mother         Elbows and wrists    Cardiovascular Mother     Hyperlipidemia Mother     Anxiety Disorder Mother     Osteoporosis Mother     Hypertension Father     Heart Disease Father     Diabetes Father         diet control for high blood sugar, on medication    Cancer Father         pre cancerous skin lesion removed    Heart Disease Sister         Heart Races    Anxiety Disorder Sister     Alcohol/Drug Brother     Genitourinary Problems Brother         kidney stone issues    Valvular heart disease Brother         aortic valve with aneurysm    Anxiety Disorder Brother     Substance Abuse Brother     Diabetes Maternal Grandmother         Late Adult Onset    Cerebrovascular Disease Maternal Grandmother     Breast Cancer Maternal Grandmother     Prostate Cancer Maternal Grandfather     Diabetes Paternal Grandmother         Late Adult Onset    Hypertension Paternal Grandmother     Breast Cancer Paternal Grandmother     Arthritis Paternal Grandmother     Cardiovascular Paternal Grandmother         CHF    Thyroid Disease Paternal Grandmother     Alcohol/Drug Paternal Grandfather         Alcohol    Cancer Paternal Grandfather         Emphysema/Lung CA    Psychotic Disorder Son         High Functional Autism    Anxiety Disorder Son     Asthma No family hx of     C.A.D. No family hx of     Cancer - colorectal No family hx of     Obesity No family hx of      Mental Illness History: Bipolar in family - maternal uncle, mom \"has a lot of stuff\" brother maybe?  Also see above.  Substance Abuse History: Yes: Per EPIC Electronic Medical Record  Suicide History: Denies  Medications: Unknown     Social History:  Birth place: Grew up [place], [place]  Childhood: Yes intact home; raised by biological parents; father was a   Siblings: 3 siblings  Highest education level was college graduate.   Employment Status: Full-time  Current Living situation: Lives in own home " with son.  Feels safe at home.  Children: one son  Firearms/Weapons Access: No: Patient denies   Service: No    Legal History:  No: Patient denies any legal history    Significant Losses / Trauma / Abuse / Neglect Issues:  There are indications or report of significant loss, trauma, abuse or neglect issues related to: See any pertinent information above in HPI or social history .   Issues of possible neglect are not present.     Comprehensive Examination:  Vital Signs:  Vitals: There were no vitals taken for this visit.  General/Constitutional:  Appearance: awake, alert, adequately groomed, appeared stated age and no apparent distress  Attitude:  cooperative   Eye Contact:  good  Musculoskeletal:  Muscle Strength and Tone: no gross abnormalities by observation  Psychomotor Behavior:  no evidence of tardive dyskinesia, dystonia, or tics  Gait and Station: normal, no gross abnormalities noted by observation  Psychiatric:  Speech:  clear, coherent, regular rate, rhythm, and volume  Associations:  no loose associations  Thought Process:  logical, linear and goal oriented  Thought Content:  evidence of passive suicidal ideation, no homicidal ideation, no evidence of psychotic thought, no auditory hallucinations present and no visual hallucinations present  Mood:   up and down  Affect:  mood congruent  Insight:  good  Judgment:  intact, adequate for safety  Impulse Control:  intact  Neurological:  Oriented to:  person, place, time, and situation  Attention Span and Concentration:  normal  Language: intact  Recent and Remote Memory:  Intact to interview. Not formally assessed. No amnesia.  Fund of Knowledge: appropriate    Strengths and Opportunities:  Brittany CHRISSY Paulinomaribell identified the following strengths or resources that may help she succeed in counseling: commitment to health and well being, motivation, strong social skills, and work ethic. Things that may interfere with the patient's success include:   financial hardship.    Suicide Risk Assessment:  Today Brittany Rooney reports passive thoughts of death/passive suicidal ideation. In addition, there are notable risk factors for self-harm, including anxiety, previous history of suicide attempts, suicidal ideation, hopelessness, withdrawing, and mood change. However, risk is mitigated by commitment to family, ability to volunteer a safety plan, history of seeking help when needed, future oriented, no access to firearms or weapons, and denies suicidal intent or plan. Therefore, based on all available evidence including the factors cited above, Brittany Rooney does not appear to be at imminent risk for self-harm, does not meet criteria for a 72-hr hold, and therefore remains appropriate for ongoing outpatient level of care.  A thorough assessment of risk factors related to suicide and self-harm have been reviewed and are noted above. The patient convincingly denies acute suicidality on several occasions. Local community safety resources were provided for patient to use if needed. There was no deceit detected, and the patient presented in a manner that was believable.     DSM5  Diagnosis:  Attention-Deficit/Hyperactivity Disorder  314.01 (F90.9) Unspecified Attention -Deficit / Hyperactivity Disorder  Mood Disorder, Unspecified  Anxiety, Unspecified  R/O Borderline Personality Disorder    Medical Comorbidities Include:   Patient Active Problem List    Diagnosis Date Noted    ASCUS of cervix with negative high risk HPV 01/22/2019     Priority: Medium     2013, 2016 NIL paps  1/22/19 ASCUS pap, neg HPV. Plan: cotest in 3 years.  7/10/23 LSIL, +HR HPV (not 16/18). Plan: cotest in 1 year due 07/10/24  07/17/23 Left msg and Mychart result note sent. Pt notified via mychart            Eczema of both hands 12/22/2016     Priority: Medium    Seborrheic keratosis 11/17/2015     Priority: Medium    Vitamin D deficiency disease 02/25/2013     Priority: Medium    Contact  dermatitis 02/19/2013     Priority: Medium    Obesity 02/19/2013     Priority: Medium    Adjustment disorder with anxiety 06/11/2011     Priority: Medium    ADD (attention deficit disorder) 06/05/2009     Priority: Medium    Hyperlipidemia LDL goal <130 12/03/2008     Priority: Medium    Hypothyroidism 11/25/2008     Priority: Medium    Malaise and fatigue      Priority: Medium     Problem list name updated by automated process. Provider to review       Impression:  Brittany Rooney is a 56 yo with past psychiatric hx including depression, anxiety, and ADHD who presents today for psychiatric evaluation. Pt with long hx of mental health struggles. Struggling significantly with mood regulation lately and has questioned possible mood disorder vs borderline personality disorder (BPD). Pt with many features of BPD after discussion of criteria and review of her hx and current struggles. Pt will read more about the dx. Pt agreeable to start a trial with lamotrigine to see if helpful for mood stabilization. Also recommend DBT and resources provided today. If lamotrigine really helpful, could consider tapering off fluoxetine. Passive thoughts of death/suicide but no plan or intent to harm self. Denies current problematic drug or alcohol use. Uses cannabis but stopped using alcohol about 4-6 weeks ago.     Medication side effects and alternatives reviewed. Health promotion activities recommended and reviewed today. All questions addressed. Education and counseling completed regarding risks and benefits of medications and psychotherapy options. Recommend therapy for additional support.     Treatment Plan:  Continue fluoxetine/Prozac 40 mg daily for mood, anxiety.   Continue Adderall XR 20 mg daily for ADHD  Continue Adderall IR 10 mg daily as needed for ADHD  Discontinue quetiapine/Seroquel   Start Lamotrigine: take 25 mg daily for two weeks then increase to 50 mg daily for two weeks then increase to 100 mg until follow-up  appointment in about six weeks.    Recommend DBT - see below for resources.   Continue all other cares per primary care provider.   Continue all other medications as reviewed per electronic medical record today.   Safety plan reviewed. To the Emergency Department as needed or call after hours crisis line at [phone] or [phone]. [place] Crisis Text Line: Text [place] to 446763  or  Suicide LifeLine Chat: suicidepreventionNanoViricidesline.org/chat  Schedule an appointment with me in 6 weeks or sooner as needed.  Call [facility] at [phone] to schedule.  Follow up with primary care provider as planned or sooner if needed for acute medical concerns.  Call the psychiatric nurse line with medication questions or concerns at [phone].  MyChart may be used to communicate with your provider, but this is not intended to be used for emergencies.    Discussed Lamictal (lamotrigine) can cause serious rashes including Tolliver-Sky syndrome which may requiring hospitalization and discontinuation of treatment. If any signs of a rash occur, please see your Primary Care Provider or a dermatologist immediately.     Patient Education:  MAIN BDT RECOMMENDATION I DISCUSSED:  [facility] DBT Skills group for Professionals and Healthcare Professionals  [email or web address]-[email or web address]-[email or web address]    Others:  [place] DBT resources:  [place]://[place].gov[place]dhs/partners-and-providers/policies-procedures/adult-mental-health/dialectical-behavior-therapy/dbt-certified-providers/    [facility] DBT resources:  Levi Pedroza   [phone]   https://dbtemdr.[employer]/faqs/     [place]   [phone]   [email or web address]     [facility]   [phone]   https://www.mhs-dbt.com     [facility]   [phone]   [email or web address]     YouTube Channel:  The BorderlinerNotes    Biography I mentioned:  [facility]  By Lisa Alvarado, PhD    [facility]  Borderline: My Recovery from Borderline Personality Disorder through Dialectical Behavior Therapy, Druze, & Online Dating.    By Alicia Orellana. 2010.    I Hate You - Don t Leave Me: Understanding the Borderline Personality.   By Caterina Randall. 2010.    Borderline Personality Disorder: The Ultimate Practical Approach to Understanding, Coping, and Living with Borderline Personality Disorder.    By Anne Corona. 2014.    This is Not the End. Conversations on Borderline Personality Disorder.   Edited by Filomena Bustos. 2016. (This is a collection of experiences from individuals dealing with BPD).    Beyond Borderline: True Stories of Recovery from Borderline Personality Disorder.   By Kamari Cevallos and Tavon Voss. 2016.    Care team has reviewed attendance agreement with patient. Patient advised that two failed appointments within 6 months may lead to termination of current episode of care.     Community Resources:    National Suicide Prevention Lifeline: 343.851.6573 (TTY: 924.688.1683). Call anytime for help.  (www.suicidepreventionlifeline.org)  National [place] on Mental Illness (www.naman.org): 768-507-7947 or 460-230-5724.   Mental Health Association (www.mentalhealth.org): 079-034-5466 or 621-606-0336.  [place] Crisis Text Line: Text [place] to 638289  Suicide LifeLine Chat: suicideDigitalSciroccoline.org/chat    Administrative Billing:   Phone Call/Video Duration: 60 Minutes  Start: 3:30p  Stop: 4:25p    Chart Review: 5 minutes    Total Time: 60 minutes    Patient Status:  Patient will continue to be seen for ongoing consultation and stabilization.    Signed:   Jossy Calle DO  [facility]    Disclaimer: This note consists of symbols derived from keyboarding, dictation and/or voice recognition software. As a result, there may be errors in the script that have gone undetected. Please consider this when interpreting information found in this chart.

## 2023-08-11 NOTE — PATIENT INSTRUCTIONS
Treatment Plan:  Continue fluoxetine/Prozac 40 mg daily for mood, anxiety.   Continue Adderall XR 20 mg daily for ADHD  Continue Adderall IR 10 mg daily as needed for ADHD  Discontinue quetiapine/Seroquel   Start Lamotrigine: take 25 mg daily for two weeks then increase to 50 mg daily for two weeks then increase to 100 mg until follow-up appointment in about six weeks.    Recommend DBT - see below for resources.   Continue all other cares per primary care provider.   Continue all other medications as reviewed per electronic medical record today.   Safety plan reviewed. To the Emergency Department as needed or call after hours crisis line at [phone] or [phone]. [place] Crisis Text Line: Text [place] to 459733  or  Suicide LifeLine Chat: suicidepreGoodman Asset Protection.org/chat  Schedule an appointment with me in 6 weeks or sooner as needed.  Call [facility] at [phone] to schedule.  Follow up with primary care provider as planned or sooner if needed for acute medical concerns.  Call the psychiatric nurse line with medication questions or concerns at [phone].  MyChart may be used to communicate with your provider, but this is not intended to be used for emergencies.    Discussed Lamictal (lamotrigine) can cause serious rashes including Tolliver-Sky syndrome which may requiring hospitalization and discontinuation of treatment. If any signs of a rash occur, please see your Primary Care Provider or a dermatologist immediately.     Patient Education:  MAIN BDT RECOMMENDATION I DISCUSSED:  [facility] DBT Skills group for Professionals and Healthcare Professionals  [email or web address]://[email or web address].[email or web address]-for-professionals    Others:  [place] DBT resources:  https://[place].gov/dhs/partners-and-providers/policies-procedures/adult-mental-health/dialectical-behavior-therapy/dbt-certified-providers/    Some [facility] DBT resources:  Levi Pedroza   [phone]   https://Page Mage.[employer]/faqs/     [place] and  [place]   [phone]   https://[employer].PowerVision     Jackelyn   [facility]   [phone]   https://www.[employer] Cap[employer]Soundhawk Corporationdbt.PowerVision     Shree   [facility]   [phone]   https://dbtassociates.PowerVision     YouTube Channel:  The BorderlinerNotes    Biography I mentioned:  [facility] [place] [facility]  By Lisa Alvarado, PhD    The Buddha & The Borderline: My Recovery from Borderline Personality Disorder through Dialectical Behavior Therapy, Zoroastrian, & Online Dating.    By Alicia Orellana. 2010.    I Hate You - Don t Leave Me: Understanding the Borderline Personality.   By Caterina Randall. 2010.    Borderline Personality Disorder: The Ultimate Practical Approach to Understanding, Coping, and Living with Borderline Personality Disorder.    By Anne Corona. 2014.    This is Not the End. Conversations on Borderline Personality Disorder.   Edited by Filomena Bustos. 2016. (This is a collection of experiences from individuals dealing with BPD).    Beyond Borderline: True Stories of Recovery from Borderline Personality Disorder.   By Kamari Cevallos and Tavon Voss. 2016.    Care team has reviewed attendance agreement with patient. Patient advised that two failed appointments within 6 months may lead to termination of current episode of care.     Community Resources:    National Suicide Prevention Lifeline: 563.311.6845 (TTY: 589.523.3424). Call anytime for help.  (www.suicidepreventionlifeline.org)  National [place] on Mental Illness (www.naman.org): 961.254.8107 or 118-563-7884.   Mental Health Association (www.mentalhealth.org): 295.353.3245 or 404-480-4912.  [place] Crisis Text Line: Text [place] to 707319  Suicide LifeLine Chat: suicidepreventionConnectedHealthline.org/chat  Patient Education   Collaborative Care Psychiatry Service  What to Expect  Here's what to expect from your Collaborative Care Psychiatry Service (CCPS).   About CCPS  CCPS means 2 people work together to help you get better. You'll meet with a  "behavioral health clinician and a psychiatric doctor. A behavioral health clinician helps people with mental health problems by talking with them. A psychiatric doctor helps people by giving them medicine.  How it works  At every visit, you'll see the behavioral health clinician (BHC) first. They'll talk with you about how you're doing and teach you how to feel better.   Then you'll see the psychiatric doctor. This doctor can help you deal with troubling thoughts and feelings by giving you medicine. They'll make sure you know the plan for your care.   CCPS usually takes 3 to 6 visits. If you need more visits, we may have you start seeing a different psychiatric doctor for ongoing care.  If you have any questions or concerns, we'll be glad to talk with you.  About visits  Be open  At your visits, please talk openly about your problems. It may feel hard, but it's the best way for us to help you.  Cancelling visits  If you can't come to your visit, please call us right away at [phone]. If you don't cancel at least 24 hours (1 full day) before your visit, that's \"late cancellation.\"  Being late to visits  Being very late is the same as not showing up. You will be a \"no show\" if:  Your appointment starts with a BHC, and you're more than 15 minutes late for a 30-minute (half hour) visit. This will also cancel your appointment with the psychiatric doctor.  Your appointment is with a psychiatric doctor only, and you're more than 15 minutes late for a 30-minute (half hour) visit.  Your appointment is with a psychiatric doctor only, and you're more than 30 minutes late for a 60-minute (full hour) visit.  If you cancel late or don't show up 2 times within 6 months, we may end your care.   Getting help between visits  If you need help between visits, you can call us Monday to Friday from 8 a.m. to 4:30 p.m. at [phone].  Emergency care  Call 911 or go to the nearest emergency department if your life or someone " else's life is in danger.  Call 408 anytime to reach the national Suicide and Crisis hotline.  Medicine refills  To refill your medicine, call your pharmacy. You can also call [facility] at [phone], Monday to Friday, 8 a.m. to 4:30 p.m. It can take 1 to 3 business days to get a refill.   Forms, letters, and tests  You may have papers to fill out, like FMLA, short-term disability, and workability. We can help you with these forms at your visits, but you must have an appointment. You may need more than 1 visit for this, to be in an intensive therapy program, or both.  Before we can give you medicine for ADHD, we may refer you to get tested for it or confirm it another way.  We may not be able to give you an emotional support animal letter.  We don't do mental health checks ordered by the court.   We don't do mental health testing, but we can refer you to get tested.   Thank you for choosing us for your care.  For informational purposes only. Not to replace the advice of your health care provider. Copyright   2022 [facility]. All rights reserved. OtherInbox 118252 - 12/22.

## 2023-08-11 NOTE — NURSING NOTE
Is the patient currently in the state of [place]? YES    Visit mode:VIDEO    If the visit is dropped, the patient can be reconnected by: VIDEO VISIT: Text to cell phone: [phone]    Will anyone else be joining the visit? NO      How would you like to obtain your AVS? MyChart    Are changes needed to the allergy or medication list? NO    Reason for visit: No chief complaint on file.

## 2023-08-11 NOTE — PROGRESS NOTES
"Virtual Visit Details    Type of service:  Video Visit     Originating Location (pt. Location): {video visit patient location:800405::\"Home\"}  {PROVIDER LOCATION On-site should be selected for visits conducted from your clinic location or adjoining [facility] hospital, academic office, or other nearby [facility] building. Off-site should be selected for all other provider locations, including home:109730}  Distant Location (provider location):  {virtual location provider:920490}  Platform used for Video Visit: {Virtual Visit Platforms:314842::\"USDS\"}    "

## 2023-08-14 DIAGNOSIS — F43.22 ADJUSTMENT DISORDER WITH ANXIETY: ICD-10-CM

## 2023-08-14 RX ORDER — FLUOXETINE 40 MG/1
40 CAPSULE ORAL DAILY
Qty: 90 CAPSULE | Refills: 1 | Status: SHIPPED | OUTPATIENT
Start: 2023-08-14 | End: 2024-02-09

## 2023-08-25 NOTE — TELEPHONE ENCOUNTER
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    APPOINTMENT DATE: 09/19/2023   NOTES STATUS DETAILS   OFFICE NOTE from referring provider Internal 07/12/2023 Dr Armstrong [facility] Office [place] message   OFFICE NOTE from other specialist Internal 07/10/2023 Dr Armstrong [facility]    DISCHARGE SUMMARY from hospital N/A    DISCHARGE REPORT from the ER N/A    OPERATIVE REPORT N/A    MEDICATION LIST N/A    EMG (for Spine) N/A    IMPLANT RECORD/STICKER N/A    LABS     CBC/DIFF N/A    CULTURES N/A    INJECTIONS DONE IN RADIOLOGY N/A    MRI N/A    CT SCAN N/A    XRAYS (IMAGES & REPORTS) Internal 07/10/2023 bilateral hand   TUMOR     PATHOLOGY  Slides & report N/A

## 2023-09-05 ENCOUNTER — MYC REFILL (OUTPATIENT)
Dept: FAMILY MEDICINE | Facility: CLINIC | Age: 58
End: 2023-09-05
Payer: COMMERCIAL

## 2023-09-05 DIAGNOSIS — F90.2 ATTENTION DEFICIT HYPERACTIVITY DISORDER (ADHD), COMBINED TYPE: ICD-10-CM

## 2023-09-06 RX ORDER — DEXTROAMPHETAMINE SACCHARATE, AMPHETAMINE ASPARTATE MONOHYDRATE, DEXTROAMPHETAMINE SULFATE AND AMPHETAMINE SULFATE 5; 5; 5; 5 MG/1; MG/1; MG/1; MG/1
20 CAPSULE, EXTENDED RELEASE ORAL DAILY
Qty: 30 CAPSULE | Refills: 0 | Status: SHIPPED | OUTPATIENT
Start: 2023-09-06 | End: 2023-09-22 | Stop reason: ALTCHOICE

## 2023-09-19 ENCOUNTER — PRE VISIT (OUTPATIENT)
Dept: ORTHOPEDICS | Facility: CLINIC | Age: 58
End: 2023-09-19

## 2023-09-22 ENCOUNTER — VIRTUAL VISIT (OUTPATIENT)
Dept: PSYCHIATRY | Facility: CLINIC | Age: 58
End: 2023-09-22
Payer: COMMERCIAL

## 2023-09-22 DIAGNOSIS — F90.9 ATTENTION DEFICIT HYPERACTIVITY DISORDER (ADHD), UNSPECIFIED ADHD TYPE: Primary | ICD-10-CM

## 2023-09-22 DIAGNOSIS — F39 MOOD DISORDER (H): ICD-10-CM

## 2023-09-22 DIAGNOSIS — F98.8 ATTENTION DEFICIT DISORDER (ADD) WITHOUT HYPERACTIVITY: ICD-10-CM

## 2023-09-22 PROCEDURE — 99214 OFFICE O/P EST MOD 30 MIN: CPT | Mod: 95 | Performed by: PSYCHIATRY & NEUROLOGY

## 2023-09-22 RX ORDER — DEXTROAMPHETAMINE SACCHARATE, AMPHETAMINE ASPARTATE, DEXTROAMPHETAMINE SULFATE AND AMPHETAMINE SULFATE 2.5; 2.5; 2.5; 2.5 MG/1; MG/1; MG/1; MG/1
10 TABLET ORAL DAILY
Qty: 30 TABLET | Refills: 0 | Status: SHIPPED | OUTPATIENT
Start: 2023-09-22 | End: 2023-10-09

## 2023-09-22 RX ORDER — LISDEXAMFETAMINE DIMESYLATE 40 MG/1
40 CAPSULE ORAL EVERY MORNING
Qty: 15 CAPSULE | Refills: 0 | Status: SHIPPED | OUTPATIENT
Start: 2023-09-22 | End: 2024-01-07

## 2023-09-22 NOTE — Clinical Note
Please call this patient to get them scheduled for a follow-up visit in 4-6 weeks. Please schedule with me and the [facility]. Thanks!

## 2023-09-22 NOTE — PROGRESS NOTES
"Virtual Visit Details    Type of service:  Video Visit     Originating Location (pt. Location): {video visit patient location:158344::\"Home\"}  {PROVIDER LOCATION On-site should be selected for visits conducted from your clinic location or adjoining [facility] hospital, academic office, or other nearby [facility] building. Off-site should be selected for all other provider locations, including home:939282}  Distant Location (provider location):  {virtual location provider:731718}  Platform used for Video Visit: {Virtual Visit Platforms:920041::\"Flashstarts\"}  "

## 2023-09-22 NOTE — NURSING NOTE
Is the patient currently in the state of [place]? YES    Visit mode:VIDEO    If the visit is dropped, the patient can be reconnected by: VIDEO VISIT: Text to cell phone:   Telephone Information:   Mobile [phone]       Will anyone else be joining the visit? No  (If patient encounters technical issues they should call [phone])    How would you like to obtain your AVS? MyChart    Are changes needed to the allergy or medication list? No    Rooming Documentation: Patient will complete qnrs in mychart.    Reason for visit: REINA Dreas

## 2023-09-22 NOTE — PROGRESS NOTES
"Telemedicine Visit: The patient's condition can be safely assessed and treated via synchronous audio and visual telemedicine encounter.      Reason for Telemedicine Visit: Patient has requested telehealth visit    Originating Site (Patient Location): Patient's home    Distant Location (provider location):  Off-site    Consent:  The patient/guardian has verbally consented to: the potential risks and benefits of telemedicine (video visit) versus in person care; bill my insurance or make self-payment for services provided; and responsibility for payment of non-covered services.     Mode of Communication:  Video Conference via [employer]    As the provider I attest to compliance with applicable laws and regulations related to telemedicine.          Outpatient Psychiatric Progress Note    Name: Brittany Rooney   : 1965                    Primary Care Provider: Esther Armstrong MD   Therapist: None    PHQ-9 scores:      2022     9:32 AM 7/10/2023     9:55 AM 2023     1:49 PM   PHQ-9 SCORE   PHQ-9 Total Score MyChart 9 (Mild depression) 13 (Moderate depression) 16 (Moderately severe depression)   PHQ-9 Total Score 9    9 13 16       GALE-7 scores:      2022     7:55 AM 7/10/2023     9:56 AM 2023     1:57 PM   GALE-7 SCORE   Total Score 12 (moderate anxiety)  21 (severe anxiety)   Total Score 12 18 21       Patient Identification:  Patient is a 58 year old,   White Not  or  female  who presents for return visit with me.  Patient is currently employed full time. Patient attended the phone/video session alone. Patient prefers to be called: \"Veronica\".    Interim History:  I last saw Brittany Rooney for outpatient psychiatry Consultation on 2023. During that appointment, we:    Continue fluoxetine/Prozac 40 mg daily for mood, anxiety.   Continue Adderall XR 20 mg daily for ADHD  Continue Adderall IR 10 mg daily as needed for ADHD  Discontinue quetiapine/Seroquel " "  Start Lamotrigine: take 25 mg daily for two weeks then increase to 50 mg daily for two weeks then increase to 100 mg until follow-up appointment in about six weeks.    Recommend DBT - see below for resources.   Continue all other cares per primary care provider.     9/22: Work has been stressful and also challenging relationships.  Things overall about the same.  No acute safety concerns.  No SI.  Overall tolerating lamotrigine okay.  No rashes.  No problematic drug or alcohol use.    Past Psychiatric Med Trials:  Psych Meds at Intake:  Adderall XR 20 mg daily   fluoxetine 40 mg daily - \"I don't know if it does anything for me\" increased to 40 mg a year ago, no side effects  Quetiapine 25 mg at bedtime  - on 2 weeks, not sure if feel different      Past Psych Meds:  Sertraline - bad on stomach  Wellbutrin     Psychiatric ROS:  Brittany Rooney reports mood has been: about the same  Anxiety has been: high  Sleep has been: about the same  Susan sxs: None  Psychosis sxs: None  ADHD/ADD sxs: Worse, feeling much more scattered, difficulty trying to get mobilized to act  PTSD sxs: NA  PHQ9 and GAD7 scores were reviewed today if completed.   Medication side effects: Denies  Current stressors include: And see HPI above  Coping mechanisms and supports include: Family, Hobbies, and Friends    Current medications include:   Current Outpatient Medications   Medication Sig    amphetamine-dextroamphetamine (ADDERALL XR) 20 MG 24 hr capsule Take 1 capsule (20 mg) by mouth daily    amphetamine-dextroamphetamine (ADDERALL) 10 MG tablet Take 1 tablet (10 mg) by mouth daily By 3 pm if needed for evening activites.   +++ appointment needed for additional refills +++    Cholecalciferol (VITAMIN D) 2000 UNIT tablet Take 1 tablet by mouth 2 times daily.    clobetasol (TEMOVATE) 0.05 % external ointment APPLY TOPICALLY TO THE AFFECTED AREA TWICE DAILY (Patient not taking: Reported on 7/10/2023)    FLUoxetine (PROZAC) 40 MG capsule " TAKE 1 CAPSULE(40 MG) BY MOUTH DAILY    IBUPROFEN 200 MG OR CAPS 2 CAPSULE EVERY 4 TO 6 HOURS AS NEEDED    lamoTRIgine (LAMICTAL) 100 MG tablet Take 1 tablet (100 mg) by mouth daily    lamoTRIgine (LAMICTAL) 25 MG tablet Take 25 mg by mouth daily for 2 weeks then 50 mg daily for 2 weeks then 100 mg daily.    levothyroxine (SYNTHROID/LEVOTHROID) 125 MCG tablet Take 1 tablet (125 mcg) by mouth daily    mometasone (ELOCON) 0.1 % external cream Apply  topically as needed.    QUEtiapine (SEROQUEL) 25 MG tablet Take 1 tablet (25 mg) by mouth At Bedtime     No current facility-administered medications for this visit.       The [place] Prescription Monitoring Program has been reviewed and there are no concerns about diversionary activity for controlled substances at this time.   09/06/2023 09/06/2023 1 Dextroamp-Amphet Er 20 Mg Cap 30.00 30 Pa Kol [id number] Wal (1802) 0/0  [place]   07/31/2023 06/19/2023 1 Dextroamp-Amphet Er 20 Mg Cap 30.00 30 Pa Kol [id number] [facility] (1802[place]   07/31/2023 04/18/2023 1 Dextroamp-Amphetamin 10 Mg Tab 30.00 30 Pa Kol [id number] [facility] (1802) 0/[place]   05/17/2023 05/17/2023 1 Dextroamp-Amphet Er 20 Mg Cap 30.00 30  Maria E [id number] Wal (1802) 0/0  [place]   05/17/2023 05/17/2023 1 Dextroamp-Amphetamin 10 Mg Tab 30.00 30  Maria E [id number] [facility] (1802) 0/0  [place]       Past Medical/Surgical History:  Past Medical History:   Diagnosis Date    Abnormal Pap smear of cervix 01/22/2019    see problem list    Attention deficit disorder with hyperactivity(314.01)     Major depressive disorder, recurrent episode, unspecified     Morbid obesity (H)     Nontoxic uninodular goiter     Other malaise and fatigue     Other malaise and fatigue     Plantar fascial fibromatosis     Thyroid nodule       has a past medical history of Abnormal Pap smear of cervix (01/22/2019), Attention deficit disorder with hyperactivity(314.01), Major depressive disorder, recurrent episode, unspecified, Morbid  obesity (H), Nontoxic uninodular goiter, Other malaise and fatigue, Other malaise and fatigue, Plantar fascial fibromatosis, and Thyroid nodule.    She has no past medical history of Arthritis, Cancer (H), Cerebral infarction (H), Congestive heart failure (H), COPD (chronic obstructive pulmonary disease) (H), Depressive disorder, Diabetes (H), Heart disease, History of blood transfusion, Hypertension, or Uncomplicated asthma.    Social History:  Reviewed. No changes to social history except as noted above in HPI.    Vital Signs:   None. This is phone/video visit.     Labs:  Most recent laboratory results reviewed and no new labs.     Review of Systems:  10 systems (general, cardiovascular, respiratory, eyes, ENT, endocrine, GI, , M/S, neurological) were reviewed. Most pertinent finding(s) is/are:  denies fever, cough, persistent headaches, shortness of breath, chest pain, severe GI symptoms, trouble urinating, severe pain. The remaining systems are all unremarkable.    Mental Status Examination (limited as this is by phone/video):  Appearance: Awake, alert, appears stated age, no acute distress, well-groomed   Attitude:  cooperative, pleasant   Motor: No gross abnormalities observed via video, not formally tested   Oriented to:  person, place, time, and situation  Attention Span and Concentration:  normal  Speech:  clear, coherent, regular rate, rhythm, and volume  Language: intact  Mood:  anxious  Affect:  appropriate and in normal range and mood congruent  Associations:  no loose associations  Thought Process:  logical, linear and goal oriented  Thought Content:  no evidence of suicidal ideation or homicidal ideation, no evidence of psychotic thought, no auditory hallucinations present and no visual hallucinations present  Recent and Remote Memory:  Intact to interview. Not formally assessed. No amnesia.  Fund of Knowledge: appropriate  Insight:  good  Judgment:  intact, adequate for safety  Impulse Control:   intact    Suicide Risk Assessment:  Today Brittany Rooney reports no suicidal ideation. Based on all available evidence including the factors cited above, Brittany Rooney does not appear to be at imminent risk for self-harm, does not meet criteria for a 72-hr hold, and therefore remains appropriate for ongoing outpatient level of care.  A thorough assessment of risk factors related to suicide and self-harm have been reviewed and are noted above. The patient convincingly denies suicidality on several occasions. Local community safety resources reviewed for patient to use if needed. There was no deceit detected, and the patient presented in a manner that was believable.     DSM5 Diagnosis:  Attention-Deficit/Hyperactivity Disorder  314.01 (F90.9) Unspecified Attention -Deficit / Hyperactivity Disorder  Mood Disorder, Unspecified  Anxiety, Unspecified  R/O Borderline Personality Disorder    Medical comorbidities include:   Patient Active Problem List    Diagnosis Date Noted    ASCUS of cervix with negative high risk HPV 01/22/2019     Priority: Medium     2013, 2016 NIL paps  1/22/19 ASCUS pap, neg HPV. Plan: cotest in 3 years.  7/10/23 LSIL, +HR HPV (not 16/18). Plan: cotest in 1 year due 07/10/24  07/17/23 Left msg and Mychart result note sent. Pt notified via mychart            Eczema of both hands 12/22/2016     Priority: Medium    Seborrheic keratosis 11/17/2015     Priority: Medium    Vitamin D deficiency disease 02/25/2013     Priority: Medium    Contact dermatitis 02/19/2013     Priority: Medium    Obesity 02/19/2013     Priority: Medium    Adjustment disorder with anxiety 06/11/2011     Priority: Medium    ADD (attention deficit disorder) 06/05/2009     Priority: Medium    Hyperlipidemia LDL goal <130 12/03/2008     Priority: Medium    Hypothyroidism 11/25/2008     Priority: Medium    Malaise and fatigue      Priority: Medium     Problem list name updated by automated process. Provider to review          Psychosocial & Contextual Factors: see HPI above    Assessment:  From Intake, 8/11/2023:  Brittany Rooney is a 58 yo with past psychiatric hx including depression, anxiety, and ADHD who presents today for psychiatric evaluation. Pt with long hx of mental health struggles. Struggling significantly with mood regulation lately and has questioned possible mood disorder vs borderline personality disorder (BPD). Pt with many features of BPD after discussion of criteria and review of her hx and current struggles. Pt will read more about the dx. Pt agreeable to start a trial with lamotrigine to see if helpful for mood stabilization. Also recommend DBT and resources provided today. If lamotrigine really helpful, could consider tapering off fluoxetine. Passive thoughts of death/suicide but no plan or intent to harm self. Denies current problematic drug or alcohol use. Uses cannabis but stopped using alcohol about 4-6 weeks ago.      9/22/2023:  Discussed patient may benefit from Vyvanse trial over Adderall XR.  We will start trial with Vyvanse 40 mg daily in place of Adderall XR dose to see if more effective and better tolerated.  Could consider continued titration of lamotrigine at next visit.  Strongly recommend therapy/consideration for DBT.  Resources previously sent for DBT.  No acute safety concerns.  No SI.  No problematic drug or alcohol use.    Medication side effects and alternatives were reviewed. Health promotion activities recommended and reviewed today. All questions addressed. Education and counseling completed regarding risks and benefits of medications and psychotherapy options. Recommend therapy for additional support.     Treatment Plan:  Continue Adderall IR 10 mg IR daily as needed for ADHD  Continue fluoxetine 40 mg daily for mood and anxiety  Continue lamotrigine 100 mg daily for mood.   Continue quetiapine/Seroquel 25 mg at bedtime as needed for sleep  Discontinue Adderall XR since starting  Vyvanse  Start Vyvanse 40 mg daily for ADHD  Continue all other cares per primary care provider.   Continue all other medications as reviewed per electronic medical record today.   Safety plan reviewed. To the Emergency Department as needed or call after hours crisis line at [phone] or [phone]. [place] Crisis Text [place]. Text [place] to 066828 or Suicide LifeLine Chat: suicidepreventionNuenzline.org/chat  Consider psychotherapy as nonpharmacotherapy option. Strongly consider DBT.   Schedule an appointment with me in 6 weeks or sooner as needed. Call [facility] at [phone] to schedule.  Follow up with primary care provider as planned or for acute medical concerns.  Call the psychiatric nurse line with medication questions or concerns at [phone].  [employer] may be used to communicate with your provider, but this is not intended to be used for emergencies.    Have previously discussed risks of stimulant medication including, but not limited to, decreased appetite, risk of tics (and that they may be lasting), trouble sleeping, cardiac risks such as increased heart rate and blood pressure, and rare risk of sudden cardiac death.  Also risk of addiction/tolerance/dependence.    Administrative Billing:   Phone Call/Video Duration: 18 Minutes  Start: 1:38p  Stop: 1:56p    Patient Status:  Patient will continue to be seen for ongoing consultation and stabilization.    Signed:   Jossy Calle DO  [facility]    Disclaimer: This note consists of symbols derived from keyboarding, dictation and/or voice recognition software. As a result, there may be errors in the script that have gone undetected. Please consider this when interpreting information found in this chart.

## 2023-10-02 RX ORDER — LAMOTRIGINE 100 MG/1
100 TABLET ORAL DAILY
Qty: 90 TABLET | Refills: 0 | Status: SHIPPED | OUTPATIENT
Start: 2023-10-02 | End: 2023-12-31

## 2023-10-02 NOTE — PATIENT INSTRUCTIONS
Treatment Plan:  Continue Adderall IR 10 mg IR daily as needed for ADHD  Continue fluoxetine 40 mg daily for mood and anxiety  Continue lamotrigine 100 mg daily for mood.   Continue quetiapine/Seroquel 25 mg at bedtime as needed for sleep  Discontinue Adderall XR since starting Vyvanse  Start Vyvanse 40 mg daily for ADHD  Continue all other cares per primary care provider.   Continue all other medications as reviewed per electronic medical record today.   Safety plan reviewed. To the Emergency Department as needed or call after hours crisis line at [phone] or [phone]. [place] Crisis Text [place]. Text [place] to 484673 or Suicide LifeLine Chat: suicidepreventionBehanceline.org/chat  Consider psychotherapy as nonpharmacotherapy option. Strongly consider DBT.   Schedule an appointment with me in 6 weeks or sooner as needed. Call [facility] at [phone] to schedule.  Follow up with primary care provider as planned or for acute medical concerns.  Call the psychiatric nurse line with medication questions or concerns at [phone].  [employer] may be used to communicate with your provider, but this is not intended to be used for emergencies.    Have previously discussed risks of stimulant medication including, but not limited to, decreased appetite, risk of tics (and that they may be lasting), trouble sleeping, cardiac risks such as increased heart rate and blood pressure, and rare risk of sudden cardiac death.  Also risk of addiction/tolerance/dependence.    =

## 2023-10-09 ENCOUNTER — VIRTUAL VISIT (OUTPATIENT)
Dept: PSYCHOLOGY | Facility: CLINIC | Age: 58
End: 2023-10-09
Payer: COMMERCIAL

## 2023-10-09 ENCOUNTER — MYC MEDICAL ADVICE (OUTPATIENT)
Dept: FAMILY MEDICINE | Facility: CLINIC | Age: 58
End: 2023-10-09
Payer: COMMERCIAL

## 2023-10-09 DIAGNOSIS — F43.23 ADJUSTMENT DISORDER WITH MIXED ANXIETY AND DEPRESSED MOOD: Primary | ICD-10-CM

## 2023-10-09 DIAGNOSIS — F90.2 ATTENTION DEFICIT HYPERACTIVITY DISORDER (ADHD), COMBINED TYPE: ICD-10-CM

## 2023-10-09 DIAGNOSIS — F98.8 ATTENTION DEFICIT DISORDER (ADD) WITHOUT HYPERACTIVITY: ICD-10-CM

## 2023-10-09 DIAGNOSIS — F43.22 ADJUSTMENT DISORDER WITH ANXIETY: ICD-10-CM

## 2023-10-09 DIAGNOSIS — F33.1 MODERATE EPISODE OF RECURRENT MAJOR DEPRESSIVE DISORDER (H): ICD-10-CM

## 2023-10-09 PROCEDURE — 90834 PSYTX W PT 45 MINUTES: CPT | Mod: VID | Performed by: SOCIAL WORKER

## 2023-10-09 RX ORDER — DEXTROAMPHETAMINE SACCHARATE, AMPHETAMINE ASPARTATE MONOHYDRATE, DEXTROAMPHETAMINE SULFATE AND AMPHETAMINE SULFATE 5; 5; 5; 5 MG/1; MG/1; MG/1; MG/1
20 CAPSULE, EXTENDED RELEASE ORAL DAILY
Qty: 30 CAPSULE | Refills: 0 | Status: SHIPPED | OUTPATIENT
Start: 2023-10-09 | End: 2024-02-09 | Stop reason: ALTCHOICE

## 2023-10-09 RX ORDER — QUETIAPINE FUMARATE 25 MG/1
25 TABLET, FILM COATED ORAL AT BEDTIME
Qty: 90 TABLET | Refills: 1 | Status: SHIPPED | OUTPATIENT
Start: 2023-10-09 | End: 2023-10-09

## 2023-10-09 RX ORDER — DEXTROAMPHETAMINE SACCHARATE, AMPHETAMINE ASPARTATE, DEXTROAMPHETAMINE SULFATE AND AMPHETAMINE SULFATE 2.5; 2.5; 2.5; 2.5 MG/1; MG/1; MG/1; MG/1
10 TABLET ORAL DAILY
Qty: 30 TABLET | Refills: 0 | Status: SHIPPED | OUTPATIENT
Start: 2023-10-09 | End: 2023-12-31

## 2023-10-09 ASSESSMENT — COLUMBIA-SUICIDE SEVERITY RATING SCALE - C-SSRS
1. IN THE PAST MONTH, HAVE YOU WISHED YOU WERE DEAD OR WISHED YOU COULD GO TO SLEEP AND NOT WAKE UP?: NO
ATTEMPT LIFETIME: NO
6. HAVE YOU EVER DONE ANYTHING, STARTED TO DO ANYTHING, OR PREPARED TO DO ANYTHING TO END YOUR LIFE?: NO
2. HAVE YOU ACTUALLY HAD ANY THOUGHTS OF KILLING YOURSELF?: NO
TOTAL  NUMBER OF INTERRUPTED ATTEMPTS LIFETIME: NO
REASONS FOR IDEATION LIFETIME: MOSTLY TO END OR STOP THE PAIN (YOU COULDN'T GO ON LIVING WITH THE PAIN OR HOW YOU WERE FEELING)
TOTAL  NUMBER OF ABORTED OR SELF INTERRUPTED ATTEMPTS LIFETIME: NO
1. HAVE YOU WISHED YOU WERE DEAD OR WISHED YOU COULD GO TO SLEEP AND NOT WAKE UP?: YES

## 2023-10-09 ASSESSMENT — ANXIETY QUESTIONNAIRES
GAD7 TOTAL SCORE: 17
IF YOU CHECKED OFF ANY PROBLEMS ON THIS QUESTIONNAIRE, HOW DIFFICULT HAVE THESE PROBLEMS MADE IT FOR YOU TO DO YOUR WORK, TAKE CARE OF THINGS AT HOME, OR GET ALONG WITH OTHER PEOPLE: SOMEWHAT DIFFICULT
1. FEELING NERVOUS, ANXIOUS, OR ON EDGE: NEARLY EVERY DAY
5. BEING SO RESTLESS THAT IT IS HARD TO SIT STILL: SEVERAL DAYS
4. TROUBLE RELAXING: NEARLY EVERY DAY
6. BECOMING EASILY ANNOYED OR IRRITABLE: NEARLY EVERY DAY
3. WORRYING TOO MUCH ABOUT DIFFERENT THINGS: NEARLY EVERY DAY
2. NOT BEING ABLE TO STOP OR CONTROL WORRYING: NEARLY EVERY DAY
GAD7 TOTAL SCORE: 17
7. FEELING AFRAID AS IF SOMETHING AWFUL MIGHT HAPPEN: SEVERAL DAYS

## 2023-10-09 ASSESSMENT — PATIENT HEALTH QUESTIONNAIRE - PHQ9
10. IF YOU CHECKED OFF ANY PROBLEMS, HOW DIFFICULT HAVE THESE PROBLEMS MADE IT FOR YOU TO DO YOUR WORK, TAKE CARE OF THINGS AT HOME, OR GET ALONG WITH OTHER PEOPLE: SOMEWHAT DIFFICULT
SUM OF ALL RESPONSES TO PHQ QUESTIONS 1-9: 13
SUM OF ALL RESPONSES TO PHQ QUESTIONS 1-9: 13

## 2023-10-09 NOTE — PROGRESS NOTES
[facility]   Mental Health & Addiction Services     Progress Note - Initial Visit    Patient  Name:  Brittany Rooney Date: 10/09/23           Service Type: Individual     Visit Start Time: 10:02 AM  visit End Time: 10:47 AM    Visit #: 1    Attendees: Client attended alone    Service Modality:  Video Visit:      Provider verified identity through the following two step process.  Patient provided:  Patient photo    Telemedicine Visit: The patient's condition can be safely assessed and treated via synchronous audio and visual telemedicine encounter.      Reason for Telemedicine Visit: Patient convenience (e.g. access to timely appointments / distance to available provider)    Originating Site (Patient Location): Patient's place of employment    Distant Site (Provider Location): [facility] [place]    Consent:  The patient/guardian has verbally consented to: the potential risks and benefits of telemedicine (video visit) versus in person care; bill my insurance or make self-payment for services provided; and responsibility for payment of non-covered services.     Patient would like the video invitation sent by:  Send to e-mail at: [email or web address]    Mode of Communication:  Video Conference via [facility]    Distant Location (Provider):  On-site    As the provider I attest to compliance with applicable laws and regulations related to telemedicine.       DATA:   Interactive Complexity: No   Crisis: No     Presenting Concerns/  Current Stressors:   Today, patient and therapist started the diagnostic assessment, but were unable to complete due to time constraints. ? patient began by sharing the challenges that she has faced over the last 3 years including getting , financial stressors, and using alcohol as a coping mechanism.  Patient also shared that she can experience mood swings or erratic moods which typically are more on the downside.   Therapist assessed for risk and safety - no concerns at this time. ? patient did share that she experiences thoughts of wanting to just disappear and in the past she shared she felt like SI would be an easy answer.  Patient notes she has not experienced those thoughts since July and her son has always been a deterrent.  Patient and therapist will continue with the DA during next session.       ASSESSMENT:  Mental Status Assessment:  Appearance:   Appropriate   Eye Contact:   Good   Psychomotor Behavior: Normal   Attitude:   Cooperative  Interested  Orientation:   All  Speech   Rate / Production: Normal/ Responsive   Volume:  Normal   Mood:    Anxious   Affect:    Appropriate   Thought Content:  Clear   Thought Form:  Coherent  Logical   Insight:    Fair       Safety Issues and Plan for Safety and Risk Management:   [place] Suicide Severity Rating Scale (Lifetime/Recent)      10/9/2023    11:06 AM   [place] Suicide Severity Rating (Lifetime/Recent)   Q1 Wish to be Dead (Lifetime) Y   Wish to be Dead Description (Lifetime) Thoughts that things would be easier if she were not here   1. Wish to be Dead (Past 1 Month) N   Q2 Non-Specific Active Suicidal Thoughts (Lifetime) N   Deterrents (Lifetime) 1   Reasons for Ideation (Lifetime) 4   Actual Attempt (Lifetime) N   Has subject engaged in non-suicidal self-injurious behavior? (Lifetime) N   Interrupted Attempts (Lifetime) N   Aborted or Self-Interrupted Attempt (Lifetime) N   Preparatory Acts or Behavior (Lifetime) N   Calculated C-SSRS Risk Score (Lifetime/Recent) No Risk Indicated     Patient denies current fears or concerns for personal safety.  Patient denies current or recent suicidal ideation or behaviors.  Patient denies current or recent homicidal ideation or behaviors.  Patient denies current or recent self injurious behavior or ideation.  Patient denies other safety concerns.  Recommended that patient call 911 or go to the local ED should there be a change  in any of these risk factors.  Patient reports there are no firearms in the house.     Diagnostic Criteria:  Adjustment Disorder  A. The development of emotional or behavioral symptoms in response to an identifiable stressor(s) occurring within 3 months of the onset of the stressor(s)  B. These symptoms or behaviors are clinically significant, as evidenced by one or both of the following:       - Marked distress that is out of proportion to the severity/intensity of the stressor (with consideration for external context & culture)       - Significant impairment in social, occupational, or other important areas of functioning  C. The stress-related disturbance does not meet criteria for another disorder & is not not an exacerbation of another mental disorder  D. The symptoms do not represent normal bereavement  E. Once the stressor or its consequences have terminated, the symptoms do not persist for more than an additional 6 months       * Adjustment Disorder with Mixed Anxiety and Depressed Mood: The predominant manifestation is a combination of depression and anxiety      DSM5 Diagnoses: (Sustained by DSM5 Criteria Listed Above)  Diagnoses: Adjustment Disorders  309.28 (F43.23) With mixed anxiety and depressed mood  Psychosocial & Contextual Factors: Recent divorce, financial stress and strain, struggles with fluctuating  WHODAS 2.0 (12 item):        No data to display              Intervention:   Psychodynamic- Patient processed internal experiences   Collateral Reports Completed:  Not Applicable      PLAN: (Homework, other):  1. Provider will continue Diagnostic Assessment.  Patient was given the following to do until next session: Goals and areas of focus for the therapeutic process    2. Provider recommended the following referrals: None at this time.      3.  Suicide Risk and Safety Concerns were assessed for Brittany Rooney.    Patient meets the following risk assessment and triage:  Patient and therapist  will complete a safety plan at her next appointment      Josi Cevallos, [facility]  October 9, 2023      Answers submitted by the patient for this visit:  Patient Health Questionnaire (Submitted on 10/9/2023)  If you checked off any problems, how difficult have these problems made it for you to do your work, take care of things at home, or get along with other people?: Somewhat difficult  PHQ9 TOTAL SCORE: 13  GALE-7 (Submitted on 10/9/2023)  GALE 7 TOTAL SCORE: 17

## 2023-10-24 ENCOUNTER — VIRTUAL VISIT (OUTPATIENT)
Dept: PSYCHOLOGY | Facility: CLINIC | Age: 58
End: 2023-10-24
Payer: COMMERCIAL

## 2023-10-24 DIAGNOSIS — F41.1 GAD (GENERALIZED ANXIETY DISORDER): ICD-10-CM

## 2023-10-24 DIAGNOSIS — F33.1 MODERATE EPISODE OF RECURRENT MAJOR DEPRESSIVE DISORDER (H): Primary | ICD-10-CM

## 2023-10-24 PROCEDURE — 90791 PSYCH DIAGNOSTIC EVALUATION: CPT | Mod: 95 | Performed by: SOCIAL WORKER

## 2023-10-24 ASSESSMENT — ANXIETY QUESTIONNAIRES
6. BECOMING EASILY ANNOYED OR IRRITABLE: NEARLY EVERY DAY
2. NOT BEING ABLE TO STOP OR CONTROL WORRYING: NEARLY EVERY DAY
7. FEELING AFRAID AS IF SOMETHING AWFUL MIGHT HAPPEN: MORE THAN HALF THE DAYS
IF YOU CHECKED OFF ANY PROBLEMS ON THIS QUESTIONNAIRE, HOW DIFFICULT HAVE THESE PROBLEMS MADE IT FOR YOU TO DO YOUR WORK, TAKE CARE OF THINGS AT HOME, OR GET ALONG WITH OTHER PEOPLE: VERY DIFFICULT
3. WORRYING TOO MUCH ABOUT DIFFERENT THINGS: NEARLY EVERY DAY
1. FEELING NERVOUS, ANXIOUS, OR ON EDGE: NEARLY EVERY DAY
4. TROUBLE RELAXING: MORE THAN HALF THE DAYS
GAD7 TOTAL SCORE: 18
GAD7 TOTAL SCORE: 18
5. BEING SO RESTLESS THAT IT IS HARD TO SIT STILL: MORE THAN HALF THE DAYS

## 2023-10-24 ASSESSMENT — PATIENT HEALTH QUESTIONNAIRE - PHQ9
SUM OF ALL RESPONSES TO PHQ QUESTIONS 1-9: 14
10. IF YOU CHECKED OFF ANY PROBLEMS, HOW DIFFICULT HAVE THESE PROBLEMS MADE IT FOR YOU TO DO YOUR WORK, TAKE CARE OF THINGS AT HOME, OR GET ALONG WITH OTHER PEOPLE: VERY DIFFICULT
SUM OF ALL RESPONSES TO PHQ QUESTIONS 1-9: 14

## 2023-10-24 NOTE — PROGRESS NOTES
"[facility]      PATIENT'S NAME: Brittany Rooney  PREFERRED NAME: Veronica  PRONOUNS:   She/her    MRN: [id number]  : 1965  ADDRESS: [address] [place], [place] [address] [address]  [facility]:  [id number]  DATE OF SERVICE: 10/24/23  START TIME: 4:00 PM  END TIME: 4:55 PM  PREFERRED PHONE: [phone]  May we leave a program related message: Yes  SERVICE MODALITY:  Video Visit:      Provider verified identity through the following two step process.  Patient provided:  Patient photo and Patient address    Telemedicine Visit: The patient's condition can be safely assessed and treated via synchronous audio and visual telemedicine encounter.      Reason for Telemedicine Visit: Patient convenience (e.g. access to timely appointments / distance to available provider)    Originating Site (Patient Location): Patient's home    Distant Site (Provider Location): Provider Remote Setting- Home Office    Consent:  The patient/guardian has verbally consented to: the potential risks and benefits of telemedicine (video visit) versus in person care; bill my insurance or make self-payment for services provided; and responsibility for payment of non-covered services.     Patient would like the video invitation sent by:  Send to e-mail at: [email or web address]    Mode of Communication:  Video Conference via Amwell    Distant Location (Provider):  Off-site    As the provider I attest to compliance with applicable laws and regulations related to telemedicine.    UNIVERSAL ADULT Mental Health DIAGNOSTIC ASSESSMENT    Identifying Information:  Patient is a 58 year old,   individual.  [facility]  Patient attended the session alone.    Chief Complaint:   The reason for seeking services at this time is: \"Mental health\".  The problem(s) began 25.    Patient has attempted to resolve these concerns in the past through medication " management .    Social/Family History:  Patient reported they grew up in [place]  .  They were raised by biological parents  .  Parents were always together.  Patient reported that their childhood was on the outside positive however on the inside there were challenges.  Patient shared that her parents were wonderful, she had a great home, and that her parents are very involved in [employer].  She notes that her parents were often busy and she was cared for by others within the [employer], her dad could be emotionally unavailable, and she often felt on her own.  Patient is 1 of 4 children and has 2 brothers and a sister.  Patient described their current relationships with family of origin as at times surface level but she is working towards developing solid relationships.  Patient does note that one of her brothers is currently in halfway and she is not sure what that journey forward will look like.     The patient describes their cultural background as  .  Cultural influences and impact on patient's life structure, values, norms, and healthcare: None.  Contextual influences on patient's health include: Contextual Factors: Individual Factors history with ADHD and recent divorce and Family Factors [employer] and conservative family which patient rebel against, chemical dependency issues with her grandfather, and legal issues .    These factors will be addressed in the Preliminary Treatment plan. Patient identified their preferred language to be English. Patient reported they does not need the assistance of an  or other support involved in therapy.     Patient reported had no significant delays in developmental tasks.   Patient's highest education level was college graduate  .  Patient identified the following learning problems: attention and concentration.  Modifications will not be used to assist communication in therapy.  Patient reports they are  able to understand written  materials.    Patient reported the following relationship history, patient shared that she is often attracted to emotionally unavailable people.  She also shared regarding challenging relationship dynamics that existed for her around the birth of her son.  Patient's current relationship status is single for 3 years following the divorce between she and her .   Patient identified their sexual orientation as heterosexual.  Patient reported having 1 child(marek). Patient identified siblings as part of their support system.  Patient identified the quality of these relationships as good,  .      Patient's current living/housing situation involves staying in own home/apartment.  The immediate members of family and household include Brittany  Estelalukeshannon, Emily,Self  and they report that housing is stable.    Patient is currently employed fulltime.  Patient reports their finances are obtained through employment. Patient does identify finances as a current stressor.      Patient reported that they have not been involved with the legal system.     Patient does not report being under probation/ parole/ jurisdiction. They are not under any current court jurisdiction. .  However, patient does report that she is currently experiencing a lot of chronic issues which include 1 garnishment.    Patient's Strengths and Limitations:  Patient identified the following strengths or resources that will help them succeed in treatment: commitment to health and well being, family support, insight, intelligence, motivation, and work ethic. Things that may interfere with the patient's success in treatment include: financial hardship.     Assessments:  The following assessments were completed by patient for this visit:  PHQ9:       4/1/2020     7:22 AM 3/10/2021     8:12 AM 7/1/2022     9:32 AM 7/10/2023     9:55 AM 8/11/2023     1:49 PM 10/9/2023     9:55 AM 10/24/2023     3:18 PM   PHQ-9 SCORE   PHQ-9 Total Score MyChart   9 (Mild depression)  13 (Moderate depression) 16 (Moderately severe depression) 13 (Moderate depression) 14 (Moderate depression)   PHQ-9 Total Score 5 10 9    9 13 16 13 14     GAD7:       3/10/2021     8:12 AM 7/1/2022     9:35 AM 7/20/2022     7:55 AM 7/10/2023     9:56 AM 8/11/2023     1:57 PM 10/9/2023     9:55 AM 10/24/2023     3:19 PM   GALE-7 SCORE   Total Score  12 (moderate anxiety) 12 (moderate anxiety)  21 (severe anxiety) 17 (severe anxiety) 18 (severe anxiety)   Total Score 18 12    12 12 18 21 17 18     CAGE-AID:       8/11/2023     1:59 PM   CAGE-AID Total Score   Total Score 2   Total Score MyChart 2 (A total score of 2 or greater is considered clinically significant)     PROMIS 10-Global Health (only subscores and total score):       8/11/2023     1:59 PM 10/9/2023     9:56 AM 10/24/2023     3:19 PM   PROMIS-10 Scores Only   Global Mental Health Score 6 5 7   Global Physical Health Score 15 15 16   PROMIS TOTAL - SUBSCORES 21 20 23     [place] Suicide Severity Rating Scale (Lifetime/Recent)      10/9/2023    11:06 AM   [place] Suicide Severity Rating (Lifetime/Recent)   Q1 Wish to be Dead (Lifetime) Y   Wish to be Dead Description (Lifetime) Thoughts that things would be easier if she were not here   1. Wish to be Dead (Past 1 Month) N   Q2 Non-Specific Active Suicidal Thoughts (Lifetime) N   Deterrents (Lifetime) 1   Reasons for Ideation (Lifetime) 4   Actual Attempt (Lifetime) N   Has subject engaged in non-suicidal self-injurious behavior? (Lifetime) N   Interrupted Attempts (Lifetime) N   Aborted or Self-Interrupted Attempt (Lifetime) N   Preparatory Acts or Behavior (Lifetime) N   Calculated C-SSRS Risk Score (Lifetime/Recent) No Risk Indicated       Personal and Family Medical History:  Patient does report a family history of mental health concerns.  Patient reports family history includes Alcohol/Drug in her brother and paternal grandfather; Anxiety Disorder in her brother, mother, sister, and son; Arthritis in  her mother and paternal grandmother; Breast Cancer in her maternal grandmother and paternal grandmother; Cancer in her father and paternal grandfather; Cardiovascular in her mother and paternal grandmother; Cerebrovascular Disease in her maternal grandmother; Coronary Artery Disease in her mother; Diabetes in her father, maternal grandmother, and paternal grandmother; Genitourinary Problems in her brother; Heart Disease in her father, mother, and sister; Hyperlipidemia in her mother; Hypertension in her father and paternal grandmother; Osteoporosis in her mother; Prostate Cancer in her maternal grandfather; Psychotic Disorder in her son; Substance Abuse in her brother; Thyroid Disease in her paternal grandmother; Valvular heart disease in her brother..     Patient does report Mental Health Diagnosis and/or Treatment.  Patient Patient reported the following previous diagnoses which include(s): ADHD.  Patient reported symptoms began during childhood but she was not diagnosed until her late 20s.   Patient has received mental health services in the past:  Psychiatry .  Psychiatric Hospitalizations: None.  Patient denies a history of civil commitment.  Patient is not receiving other mental health services.  These include none.       Patient has had a physical exam to rule out medical causes for current symptoms.  Date of last physical exam was within the past year. Client was encouraged to follow up with PCP if symptoms were to develop. The patient has a [place] Primary Care Provider, who is named Esther Armstrong.  Patient reports no current medical concerns.  Patient denies any issues with pain..   There are not significant appetite / nutritional concerns / weight changes.   Patient does not report a history of head injury / trauma / cognitive impairment.      Patient reports current meds as:   No outpatient medications have been marked as taking for the 10/24/23 encounter (Virtual Visit) with Josi Cevallos  [facility].       Medication Adherence:  Patient reports taking.  taking prescribed medications as prescribed.    Patient Allergies:    Allergies   Allergen Reactions    Zoloft [Sertraline Hcl] Diarrhea and Cramps    Lobster [Crustaceans]     Penicillin V Potassium Rash     fever    Ultram [Tramadol] Palpitations       Medical History:    Past Medical History:   Diagnosis Date    Abnormal Pap smear of cervix 01/22/2019    see problem list    Attention deficit disorder with hyperactivity(314.01)     Major depressive disorder, recurrent episode, unspecified     Morbid obesity (H)     Nontoxic uninodular goiter     Other malaise and fatigue     Other malaise and fatigue     Plantar fascial fibromatosis     Thyroid nodule          Current Mental Status Exam:   Appearance:  Appropriate    Eye Contact:  Good   Psychomotor:  Normal       Gait / station:  no problem  Attitude / Demeanor: Cooperative  Interested  Speech      Rate / Production: Normal/ Responsive      Volume:  Normal  volume      Language:  intact and no problems  Mood:   Depressed   Affect:   Appropriate    Thought Content: Clear   Thought Process: Coherent  Logical       Associations: No loosening of associations  Insight:   Good   Judgment:  Intact   Orientation:  All  Attention/concentration: Good    Substance Use:  Patient did report a family history of substance use concerns; see medical history section for details.  Patient has not received chemical dependency treatment in the past.  Patient has not ever been to detox.      Patient is not currently receiving any chemical dependency treatment.           Substance History of use Age of first use Date of last use     Pattern and duration of use (include amounts and frequency)   Alcohol used in the past   19 07/05/23 Patient shared that she went from a social drinker to progressively increasing towards 4-5 drinks per night.  She shared that this was her way to cope and shut everything off.  After 2  years of increased drinking patient quit and has noticed a decrease in her anxiety.   Cannabis   currently use 19 08/11/23 Sporadic use     Amphetamines   never used     REPORTS SUBSTANCE USE: N/A   Cocaine/crack    never used       REPORTS SUBSTANCE USE: N/A   Hallucinogens never used         REPORTS SUBSTANCE USE: N/A   Inhalants never used         REPORTS SUBSTANCE USE: N/A   Heroin never used         REPORTS SUBSTANCE USE: N/A   Other Opiates used in the past 32 02/25/22 Not currently using   Benzodiazepine   never used     REPORTS SUBSTANCE USE: N/A   Barbiturates never used     REPORTS SUBSTANCE USE: N/A   Over the counter meds used in the past Chukd 03/08/23 As prescribed   Caffeine currently use Chikd   REPORTS SUBSTANCE USE: N/A   Nicotine  currently use 19 08/11/23 Daily use   Other substances not listed above:  Identify:  never used     REPORTS SUBSTANCE USE: N/A     Patient reported the following problems as a result of their substance use: relationship problems.    Substance Use: driving under the influence    Based on the positive CAGE score and clinical interview there   patient identified that there was an issue with her alcohol consumption and has not used in 3-1/2 months .    Significant Losses / Trauma / Abuse / Neglect Issues:   Patient did not  serve in the .  There are indications or report of significant loss, trauma, abuse or neglect issues related to: divorce / relational changes her divorce 3 years ago and her brother going to [facility] .  Concerns for possible neglect are not present.     Safety Assessment:   Patient denies current homicidal ideation and behaviors.  Patient denies current self-injurious ideation and behaviors.    Patient denied risk behaviors associated with substance use.  Patient reported high risk sexual behaviors  reported impulsive/compulsive spending behaviors reported substance use associated with mental health symptoms.  Patient reports the following current  concerns for their personal safety: None.  Patient reports there are not firearms in the house.       There are no firearms in the home..    History of Safety Concerns:  Patient denied a history of homicidal ideation.     Patient denied a history of personal safety concerns.    Patient denied a history of assaultive behaviors.    Patient denied a history of sexual assault behaviors.     Patient reported a history unsafe motor vehicle operation associated with substance use.  Patient reported a history of high risk sexual behaviors  reported a history of impulsive/compulsive spending behaviors associated with mental health symptoms.  Patient reports the following protective factors: dedication to family or friends    Risk Plan:  See Recommendations for Safety and Risk Management Plan    Review of Symptoms per patient report:   Depression: Change in sleep, Lack of interest, Excessive or inappropriate guilt, Change in energy level, Difficulties concentrating, Suicidal ideation, Feelings of hopelessness, Feelings of helplessness, Low self-worth, Ruminations, Irritability, Feeling sad, down, or depressed, Withdrawn, Frequent crying, and Self-injurious behavior  Susan:  Irritability and hyperfocus  Psychosis: Troubled by seeing shadows  and faded since being sober  Anxiety: Excessive worry, Nervousness, Sleep disturbance, Ruminations, Poor concentration, Irritability, and nail picking  Panic:  Palpitations, Tingling, and Sense of impending doom  Post Traumatic Stress Disorder:  Experienced traumatic event being chastised during her childhood, circumstances surrounding the relationship with her son's biological father, her divorce, her brother going to long term, Reexperiencing of trauma, Avoids traumatic stimuli, Increased arousal, Impaired functioning, and Dissociation   Eating Disorder: No Symptoms  ADD / ADHD:  Inattentive, Difficulties listening, Poor task completion, Distractibility, Interrupts, Intrudes, Impulsive,  Restlessness/fidgety, Hyperverbal, and Hyperactive  Conduct Disorder: No symptoms  Autism Spectrum Disorder: No symptoms  Obsessive Compulsive Disorder: No Symptoms    Patient reports the following compulsive behaviors and treatment history:  N/A .      Diagnostic Criteria:   Generalized Anxiety Disorder  A. Excessive anxiety and worry about a number of events or activities (such as work or school performance).   B. The person finds it difficult to control the worry.   - Being easily fatigued.    - Difficulty concentrating or mind going blank.    - Irritability.    - Sleep disturbance (difficulty falling or staying asleep, or restless unsatisfying sleep).   D. The focus of the anxiety and worry is not confined to features of an Axis I disorder.  E. The anxiety, worry, or physical symptoms cause clinically significant distress or impairment in social, occupational, or other important areas of functioning.   F. The disturbance is not due to the direct physiological effects of a substance (e.g., a drug of abuse, a medication) or a general medical condition (e.g., hyperthyroidism) and does not occur exclusively during a Mood Disorder, a Psychotic Disorder, or a Pervasive Developmental Disorder.    - The aformentioned symptoms began 3 year(s) ago and occurs 7 days per week and is experienced as varies depending on the circumstances. Major Depressive Disorder   - Depressed mood. Note: In children and adolescents, can be irritable mood.     - Diminished interest or pleasure in all, or almost all, activities.    - Increased sleep.    - Fatigue or loss of energy.    - Feelings of worthlessness or inappropriate and excessive guilt.    - Diminished ability to think or concentrate, or indecisiveness.    - Recurrent thoughts of death (not just fear of dying), recurrent suicidal ideation without a specific plan, or a suicide attempt or a specific plan for committing suicide.   B) The symptoms cause clinically significant distress  or impairment in social, occupational, or other important areas of functioning  C) The episode is not attributable to the physiological effects of a substance or to another medical condition  D) The occurence of major depressive episode is not better explained by other thought / psychotic disorders  E) There has never been a manic episode or hypomanic episode    Functional Status:  Patient reports the following functional impairments:  relationship(s) and self-care.     Nonprogrammatic care:  Patient is requesting basic services to address current mental health concerns.    Clinical Summary:  1. Reason for assessment: Individual therapeutic supports.  2. Psychosocial, Cultural and Contextual Factors: Recent divorce, financial stressors, challenging family dynamics.  3. Principal DSM5 Diagnoses  (Sustained by DSM5 Criteria Listed Above):   296.32 (F33.1) Major Depressive Disorder, Recurrent Episode, Moderate _  300.02 (F41.1) Generalized Anxiety Disorder.  4. Other Diagnoses that is relevant to services:   Attention-Deficit/Hyperactivity Disorder  314.01 (F90.2) Combined presentation.  Patient [facility] her early 20s [facility]  5. Provisional Diagnosis: N/A  6. Prognosis: Relieve Acute Symptoms.  7. Likely consequences of symptoms if not treated: Patient would likely see an increase in both anxiety and depressive symptomology which would have an impact on her relationships as well as everyday functioning.  8. Client strengths include:  caring, committed to sobriety, educated, empathetic, employed, insightful, intelligent, motivated, open to learning, responsible parent, support of family, friends and providers, supportive, wants to learn, and work history .     Recommendations:     1. Plan for Safety and Risk Management:   Safety and Risk: Recommended that patient call 911 or go to the local ED should there be a change in any of these risk factors..          Report to child / adult protection  services was NA.     2. Patient's identified mental health concerns with a cultural influence will be addressed by ongoing conversations during individual appointments .     3. Initial Treatment will focus on:    Depressed Mood - recognizing and understanding her experience of symptomology as well as developing and utilizing coping skills and strategies  Anxiety - recognizing symptoms and triggers and developing and utilizing coping skills and strategies .     4. Resources/Service Plan:    services are not indicated.   Modifications to assist communication are not indicated.   Additional disability accommodations are not indicated.      5. Collaboration:   Collaboration / coordination of treatment will be initiated with the following  support professionals:  NA .      6.  Referrals:   The following referral(s) will be initiated:  NA . Next Scheduled Appointment: November 10, 2023.      A Release of Information has been obtained for the following:  N/A .     Clinical Substantiation/medical necessity for the above recommendations: Patient would benefit from individual therapeutic supports to process through life transitions and understands how she is impacted by anxiety and depression.    7. GOLDY:    GOLDY:  Discussed the general effects of drugs and alcohol on health and well-being. Provider gave patient printed information about the  effects of chemical use on their health and well being. Recommendations: Patient should remain free of chemicals that she is found at this is beneficial to her mental health.  Should she resume the use of alcohol, or other substances, she should seek out an assessment and follow all recommendations..     8. Records:   These were reviewed at time of assessment.   Information in this assessment was obtained from the medical record and  provided by patient who is a fair historian.    Patient will have open access to their mental health medical record.    9.   Interactive  Complexity: No    10. Safety Plan:   Due to past SI patient and therapist will explore and develop a safety plan    Provider Name/ Credentials: Josi Cevallos [facility]  October 24, 2023        Answers submitted by the patient for this visit:  Patient Health Questionnaire (Submitted on 10/24/2023)  If you checked off any problems, how difficult have these problems made it for you to do your work, take care of things at home, or get along with other people?: Very difficult  PHQ9 TOTAL SCORE: 14  GALE-7 (Submitted on 10/24/2023)  GALE 7 TOTAL SCORE: 18

## 2023-10-26 NOTE — TELEPHONE ENCOUNTER
Requested Prescriptions   Pending Prescriptions Disp Refills     amphetamine-dextroamphetamine (ADDERALL) 10 MG tablet 30 tablet 0     Sig: Take 1 tablet (10 mg) by mouth daily By 3 pm if needed for evening activites       There is no refill protocol information for this order        amphetamine-dextroamphetamine (ADDERALL XR) 15 MG 24 hr capsule 30 capsule 0     Sig: Take 1 capsule (15 mg) by mouth daily       There is no refill protocol information for this order        Routing refill request to provider for review/approval because:  Drug not on [place] [facility] [place]     Colby Guido RN, BSN, PHN           F: s/p 1L NS in ED   E: Replete as necessary K>4 Mg>2  N: Full diet   DVT Prophylaxis: scds   GI prophylaxis: None   CODE STATUS: FULL  DISPO: [facility] F: s/p 1L NS in ED   E: Replete as necessary K>4 Mg>2  N: Full diet   DVT Prophylaxis: scds   GI prophylaxis: None   CODE STATUS: FULL  DISPO: [facility]

## 2023-10-28 ENCOUNTER — HEALTH MAINTENANCE LETTER (OUTPATIENT)
Age: 58
End: 2023-10-28

## 2023-12-07 ENCOUNTER — DOCUMENTATION ONLY (OUTPATIENT)
Dept: PSYCHOLOGY | Facility: CLINIC | Age: 58
End: 2023-12-07
Payer: COMMERCIAL

## 2023-12-07 NOTE — PROGRESS NOTES
Patient had an appointment scheduled at 3:30 PM today by video.  This writer logged into the video appointment and waited until 3:45 PM as well as reached out to patient twice by phone and was unable to connect with her.  A voice message was left reminding patient of the number for scheduling if she chooses to reschedule this appointment.

## 2023-12-19 NOTE — PROCEDURE: JUVEDERM VOLUMA XC INJECTION
- Creatinine 2.3 on presentation ; baseline 1.0  - suspect secondary to dehydration with severe alcohol use/dependence and inadequate water/hydration  - Nephrology consulted, urine spun and multiple muddy brown casts indicative of ATN  - Continue IV diuresis (currently on 160mg BID) per nephrology recommendations  - strict I/Os  - avoid nephrotoxic agents as appropriate  - continue to monitor  - Renal US without hydronephrosis  - Cr 2.1 today; improving   Use Map Statement For Sites (Optional): Yes

## 2023-12-31 ENCOUNTER — MYC REFILL (OUTPATIENT)
Dept: PSYCHIATRY | Facility: CLINIC | Age: 58
End: 2023-12-31
Payer: COMMERCIAL

## 2023-12-31 ENCOUNTER — MYC REFILL (OUTPATIENT)
Dept: FAMILY MEDICINE | Facility: CLINIC | Age: 58
End: 2023-12-31
Payer: COMMERCIAL

## 2023-12-31 DIAGNOSIS — F90.2 ATTENTION DEFICIT HYPERACTIVITY DISORDER (ADHD), COMBINED TYPE: ICD-10-CM

## 2023-12-31 DIAGNOSIS — F90.9 ATTENTION DEFICIT HYPERACTIVITY DISORDER (ADHD), UNSPECIFIED ADHD TYPE: ICD-10-CM

## 2023-12-31 DIAGNOSIS — F98.8 ATTENTION DEFICIT DISORDER (ADD) WITHOUT HYPERACTIVITY: ICD-10-CM

## 2023-12-31 DIAGNOSIS — F39 MOOD DISORDER (H): ICD-10-CM

## 2023-12-31 RX ORDER — DEXTROAMPHETAMINE SACCHARATE, AMPHETAMINE ASPARTATE MONOHYDRATE, DEXTROAMPHETAMINE SULFATE AND AMPHETAMINE SULFATE 5; 5; 5; 5 MG/1; MG/1; MG/1; MG/1
20 CAPSULE, EXTENDED RELEASE ORAL DAILY
Qty: 30 CAPSULE | Refills: 0 | Status: CANCELLED | OUTPATIENT
Start: 2023-12-31

## 2024-01-02 RX ORDER — LAMOTRIGINE 100 MG/1
100 TABLET ORAL DAILY
Qty: 15 TABLET | Refills: 0 | Status: SHIPPED | OUTPATIENT
Start: 2024-01-02 | End: 2024-01-31

## 2024-01-02 NOTE — TELEPHONE ENCOUNTER
Patient was reminded via "[employer]" to make a follow up appointment for future refills of Vyvanse on 11/9/23 and 11/29. Pt has not made a follow up appointment.  RN attempted to call the patient and LVM to return a call to the clinic. Sent a "[employer]" message to patient letting her know she needed to make a follow up appointment. Will route to [facility] to assist patient in scheduling an appointment ASAP.     Date of Last Office Visit: 9/22/23  Date of Next Office Visit: none scheduled  No shows since last visit: 0  Cancellations since last visit: 0    Medication requested: lamoTRIgine (LAMICTAL) 100 MG tablet  Date last ordered: 10/2/23 Qty: 90 Refills: 0     Lapse in medication adherence greater than 5 days?: no  If yes, call patient and gather details: na  Medication refill request verified as identical to current order?: yes  Result of Last DAM, VPA, Li+ Level, CBC, or Carbamazepine Level (at or since last visit): N/A    Last visit treatment plan:     Treatment Plan:  Continue Adderall IR 10 mg IR daily as needed for ADHD  Continue fluoxetine 40 mg daily for mood and anxiety  Continue lamotrigine 100 mg daily for mood.   Continue quetiapine/Seroquel 25 mg at bedtime as needed for sleep  Discontinue Adderall XR since starting Vyvanse  Start Vyvanse 40 mg daily for ADHD  Continue all other cares per primary care provider.   Continue all other medications as reviewed per electronic medical record today.   Safety plan reviewed. To the Emergency Department as needed or call after hours crisis line at [phone] or [phone]. [place] Crisis Text Line. Text [place] to 763366 or Suicide LifeLine Chat: suicidepreventionlifeline.org/chat  Consider psychotherapy as nonpharmacotherapy option. Strongly consider DBT.   Schedule an appointment with me in 6 weeks or sooner as needed. Call [facility] at [phone] to schedule.  Follow up with primary care provider as planned or for acute medical concerns.  Call the  psychiatric nurse line with medication questions or concerns at [phone].  MyChart may be used to communicate with your provider, but this is not intended to be used for emergencies    []Medication refilled per  Medication Refill in Ambulatory Care  policy.  [x]Medication unable to be refilled by RN due to criteria not met as indicated below:    []Eligibility - not seen in the last year   [x]Supervision - no future appointment   []Compliance - no shows, cancellations or lapse in therapy   []Verification - order discrepancy   []Controlled medication   [x]Medication not included in policy   []90-day supply request   []Other

## 2024-01-07 RX ORDER — LISDEXAMFETAMINE DIMESYLATE 40 MG/1
40 CAPSULE ORAL EVERY MORNING
Qty: 30 CAPSULE | Refills: 0 | Status: SHIPPED | OUTPATIENT
Start: 2024-01-07 | End: 2024-01-31

## 2024-01-07 RX ORDER — DEXTROAMPHETAMINE SACCHARATE, AMPHETAMINE ASPARTATE, DEXTROAMPHETAMINE SULFATE AND AMPHETAMINE SULFATE 2.5; 2.5; 2.5; 2.5 MG/1; MG/1; MG/1; MG/1
10 TABLET ORAL DAILY
Qty: 30 TABLET | Refills: 0 | Status: SHIPPED | OUTPATIENT
Start: 2024-01-07 | End: 2024-02-09

## 2024-01-07 NOTE — TELEPHONE ENCOUNTER
Refill request noted.  PMDP reviewed.  Within next visit due 1 month to assess response to medication.    Pain contract  n/a

## 2024-01-22 DIAGNOSIS — F39 MOOD DISORDER: ICD-10-CM

## 2024-01-22 RX ORDER — LAMOTRIGINE 100 MG/1
100 TABLET ORAL DAILY
Qty: 15 TABLET | Refills: 0 | OUTPATIENT
Start: 2024-01-22

## 2024-01-26 ENCOUNTER — E-VISIT (OUTPATIENT)
Dept: FAMILY MEDICINE | Facility: CLINIC | Age: 59
End: 2024-01-26
Payer: COMMERCIAL

## 2024-01-26 ENCOUNTER — MYC MEDICAL ADVICE (OUTPATIENT)
Dept: PSYCHIATRY | Facility: CLINIC | Age: 59
End: 2024-01-26

## 2024-01-26 DIAGNOSIS — R05.2 SUBACUTE COUGH: Primary | ICD-10-CM

## 2024-01-26 PROCEDURE — 99421 OL DIG E/M SVC 5-10 MIN: CPT | Performed by: FAMILY MEDICINE

## 2024-01-29 NOTE — TELEPHONE ENCOUNTER
JACK received faxed refill request for lamoTRIgine (LAMICTAL) 100 MG tablet     Patient needs to schedule a follow up appointment.     Josi Cartwright CMA January 29, 2024

## 2024-01-31 ENCOUNTER — MYC REFILL (OUTPATIENT)
Dept: FAMILY MEDICINE | Facility: CLINIC | Age: 59
End: 2024-01-31
Payer: COMMERCIAL

## 2024-01-31 ENCOUNTER — MYC REFILL (OUTPATIENT)
Dept: PSYCHIATRY | Facility: CLINIC | Age: 59
End: 2024-01-31
Payer: COMMERCIAL

## 2024-01-31 DIAGNOSIS — F90.9 ATTENTION DEFICIT HYPERACTIVITY DISORDER (ADHD), UNSPECIFIED ADHD TYPE: ICD-10-CM

## 2024-01-31 DIAGNOSIS — F39 MOOD DISORDER (H): ICD-10-CM

## 2024-01-31 RX ORDER — LISDEXAMFETAMINE DIMESYLATE 40 MG/1
40 CAPSULE ORAL EVERY MORNING
Qty: 30 CAPSULE | Refills: 0 | Status: SHIPPED | OUTPATIENT
Start: 2024-01-31 | End: 2024-02-09

## 2024-01-31 RX ORDER — LAMOTRIGINE 100 MG/1
100 TABLET ORAL DAILY
Qty: 30 TABLET | Refills: 0 | Status: SHIPPED | OUTPATIENT
Start: 2024-01-31 | End: 2024-02-09

## 2024-01-31 RX ORDER — BENZONATATE 100 MG/1
100 CAPSULE ORAL 3 TIMES DAILY PRN
Qty: 30 CAPSULE | Refills: 0 | Status: SHIPPED | OUTPATIENT
Start: 2024-01-31

## 2024-01-31 NOTE — TELEPHONE ENCOUNTER
Date of Last Office Visit: 9/22/2023  Date of Next Office Visit: 2/7/2024  No shows since last visit: none  Cancellations since last visit: none    Medication requested: lamotrigine (LAMICTAL) 100 MG  Date last ordered: 1/2/2024 Qty: 15 Refills: 0  Route: Take 1 tablet (100 mg) by mouth daily Need appt for refills      Review of [place] ?: No, this medication [place] not [place] on the [place]-    Lapse in medication adherence greater than 5 days?: Unsure, RN sent MyC message to investigate further  If yes, call patient and gather details: n/a   Medication refill request verified as identical to current order?: Yes  Result of Last DAM, VPA, Li+ Level, CBC, or Carbamazepine Level (at or since last visit):  Last lab work completed July 2023    Last visit treatment plan:     9/22/2023:  Discussed patient may benefit from Vyvanse trial over Adderall XR.  We will start trial with Vyvanse 40 mg daily in place of Adderall XR dose to see if more effective and better tolerated.  Could consider continued titration of lamotrigine at next visit.  Strongly recommend therapy/consideration for DBT.  Resources previously sent for DBT.  No acute safety concerns.  No SI.  No problematic drug or alcohol use.     Medication side effects and alternatives were reviewed. Health promotion activities recommended and reviewed today. All questions addressed. Education and counseling completed regarding risks and benefits of medications and psychotherapy options. Recommend therapy for additional support.      Treatment Plan:  Continue Adderall IR 10 mg IR daily as needed for ADHD  Continue fluoxetine 40 mg daily for mood and anxiety  Continue lamotrigine 100 mg daily for mood.   Continue quetiapine/Seroquel 25 mg at bedtime as needed for sleep  Discontinue Adderall XR since starting Vyvanse  Start Vyvanse 40 mg daily for ADHD  Continue all other cares per primary care provider.   Continue all other medications as reviewed per electronic medical  record today.   Safety plan reviewed. To the Emergency Department as needed or call after hours crisis line at [phone] or [phone]. [place] Crisis Text Line. Text [place] to 591444 or Suicide LifeLine Chat: suicidepreventionlifeline.org/chat  Consider psychotherapy as nonpharmacotherapy option. Strongly consider DBT.   Schedule an appointment with me in 6 weeks or sooner as needed. Call [facility] at [phone] to schedule.  Follow up with primary care provider as planned or for acute medical concerns.  Call the psychiatric nurse line with medication questions or concerns at [phone].  [employer] may be used to communicate with your provider, but this is not intended to be used for emergencies.      []Medication refilled per  Medication Refill in Ambulatory Care  policy.  [x]Medication unable to be refilled by RN due to criteria not met as indicated below:    []Eligibility - not seen in the last year   []Supervision - no future appointment   [x]Compliance - no shows, cancellations or lapse in therapy   []Verification - order discrepancy   []Controlled medication   []Medication not included in policy   []90-day supply request   []Other    15 tablets pended for review.    Mary Ramey RN on 1/31/2024 at 12:23 PM

## 2024-01-31 NOTE — TELEPHONE ENCOUNTER
Refill and appointment scheduling addressed in separate MyC refill encounter from today, 1/31/2024.     Closing this encounter.     Carolyn Cordero RN on 1/31/2024 at 3:11 PM

## 2024-01-31 NOTE — TELEPHONE ENCOUNTER
The patient responded back via MyC and indicated that she has been out of medication for 3 days.     Mary Ramey RN on 1/31/2024 at 2:43 PM

## 2024-02-01 NOTE — TELEPHONE ENCOUNTER
Refill request noted.  PMDP reviewed.  Next visit due now -has appointment with Dr. Calle next week and with me in March.      Pain contract  n/a

## 2024-02-07 NOTE — PROGRESS NOTES
[facility] Collaborative Care Psychiatry Services [facility]  2024      Behavioral Health Clinician Progress Note    Patient Name: Brittany Rooney           Service Type:  Individual      Service Location:   MyChart / Email (patient reached)     Session Start Time: 730AM  Session End Time: 8AM      Session Length: 16 - 37      Attendees: Client     Service Modality:  Video Visit:      Provider verified identity through the following two step process.  Patient provided:  Patient photo and Patient     Telemedicine Visit: The patient's condition can be safely assessed and treated via synchronous audio and visual telemedicine encounter.      Reason for Telemedicine Visit: Services only offered telehealth    Originating Site (Patient Location): Patient's home    Distant Site (Provider Location): [facility] MENTAL HEALTH & ADDI[facility]    Consent:  The patient/guardian has verbally consented to: the potential risks and benefits of telemedicine (video visit) versus in person care; bill my insurance or make self-payment for services provided; and responsibility for payment of non-covered services.     Patient would like the video invitation sent by:  My Chart    Mode of Communication:  Video Conference via [facility]    Distant Location (Provider):  On-site    As the provider I attest to compliance with applicable laws and regulations related to telemedicine.    Visit Activities (Refresh list every visit): [facility] [place] Only    Diagnostic Assessment Date: 10/24/23 NADYA Hinojosa [facility]  Treatment Plan Review Date: 24  See Flowsheets for today's PHQ-9 and GALE-7 results  Previous PHQ-9:       10/9/2023     9:55 AM 10/24/2023     3:18 PM 2024     7:28 AM   PHQ-9 SCORE   PHQ-9 Total Score Hilary 13 (Moderate depression) 14 (Moderate depression) 12 (Moderate depression)   PHQ-9 Total Score 13 14 12    12     Previous GALE-7:       10/9/2023     9:55 AM 10/24/2023     3:19 PM 2024     7:29  AM   GALE-7 SCORE   Total Score 17 (severe anxiety) 18 (severe anxiety) 17 (severe anxiety)   Total Score 17 18 17    17       YURIDIA LEVEL:      11/12/2010     9:00 AM   YURIDIA Score (Last Two)   YURIDIA Raw Score 40   Activation Score 60   YURIDIA Level 3       DATA  Extended Session (60+ minutes): No  Interactive Complexity: No  Crisis: No  [facility] Patient: No    Treatment Objective(s) Addressed in This Session:  Target Behavior(s): disease management/lifestyle changes reducing ADHD sx; stabilizing mood    Depressed Mood: Increase interest, engagement, and pleasure in doing things  Decrease frequency and intensity of feeling down, depressed, hopeless  Improve concentration, focus, and mindfulness in daily activities   Anxiety: will experience a reduction in anxiety and will develop more effective coping skills to manage anxiety symptoms    Current Stressors / Issues:   update:  Feels like Lamictal has been helpful has been out for 5 days.  Less internal agitation.  Likely less depressive sx.  Also out of Vyvanse 2 days.  Taking short term Adderall today.  Anxiety has been high.  Picking at things/restless.  Denies panic  Stresses: interpersonal distress, work-high school sports  Appetite: N/a  Sleep: Poor variable, recently increased  [facility]  SI/SIB/HI:  Intermittent hx of passive ideation, no plan/intent, one noted previous attempt ETOH/cut wrists in 2021.  GOLDY: Recent ETOH abstance after increased concerns.  THC occasionally  Side effects/compliance: as above  Interventions:  [facility] discussed ADHD interventions/time mgmt  Most important:  Get back on meds, titrate back on Lamictal?  Wonders about increasing Vyvanse? And or adding Adderall short term    Progress on Treatment Objective(s) / Homework:  New Objective established this session - CONTEMPLATION (Considering change and yet undecided); Intervened by assessing the negative and positive thinking  (ambivalence) about behavior change    Motivational Interviewing    MI Intervention: Co-Developed Goal: reducing ADHD sx, increasing mood stabilization, Expressed Empathy/Understanding, and Open-ended questions     Change Talk Expressed by the Patient: Desire to change Reasons to change    Provider Response to Change Talk: E - Evoked more info from patient about behavior change and A - Affirmed patient's thoughts, decisions, or attempts at behavior change    Also provided psychoeducation about behavioral health condition, symptoms, and treatment options    Assessments completed prior to visit:  The following assessments were completed by patient for this visit:  Bard Suicide Severity Rating Scale (Lifetime/Recent)      10/9/2023    11:06 AM 2/9/2024     7:58 AM   Bard Suicide Severity Rating (Lifetime/Recent)   Q1 Wish to be Dead (Lifetime) Y Y   Wish to be Dead Description (Lifetime) Thoughts that things would be easier if she were not here    1. Wish to be Dead (Past 1 Month) N Y   Q2 Non-Specific Active Suicidal Thoughts (Lifetime) N Y   2. Non-Specific Active Suicidal Thoughts (Past 1 Month)  N   3. Active Suicidal Ideation with any Methods (Not Plan) Without Intent to Act (Lifetime)  Y   Q3 Active Suicidal Ideation with any Methods (Not Plan) Without Intent to Act (Past 1 Month)  N   Q4 Active Suicidal Ideation with Some Intent to Act, Without Specific Plan (Lifetime)  Y   4. Active Suicidal Ideation with Some Intent to Act, Without Specific Plan (Past 1 Month)  N   Q5 Active Suicidal Ideation with Specific Plan and Intent (Lifetime)  Y   5. Active Suicidal Ideation with Specific Plan and Intent (Past 1 Month)  N   Most Severe Ideation Rating (Lifetime)  5   Most Severe Ideation Rating (Past 1 Month)  1   Frequency (Lifetime)  3   Frequency (Past 1 Month)  1   Duration (Lifetime)  3   Duration (Past 1 Month)  1   Controllability (Lifetime)  5   Controllability (Past 1 Month)  1   Deterrents (Lifetime) 1  5   Deterrents (Past 1 Month)  1   Reasons for Ideation (Lifetime) 4 5   Reasons for Ideation (Past 1 Month)  5   Actual Attempt (Lifetime) N Y   Total Number of Actual Attempts (Lifetime)  1   Actual Attempt Description (Lifetime)  ETOH and cut wrists   Actual Attempt (Past 3 Months)  N   Has subject engaged in non-suicidal self-injurious behavior? (Lifetime) N N   Interrupted Attempts (Lifetime) N N   Aborted or Self-Interrupted Attempt (Lifetime) N N   Preparatory Acts or Behavior (Lifetime) N N   Calculated C-SSRS Risk Score (Lifetime/Recent) No Risk Indicated Moderate Risk       Care Plan review completed: Yes    Medication Review:  Changes to psychiatric medications, see updated Medication List in EPIC.     Medication Compliance:  Yes    Changes in Health Issues:   None reported    Chemical Use Review:   Substance Use: Chemical use reviewed, no active concerns identified      Tobacco Use: No current tobacco use.      Assessment: Current Emotional / Mental Status (status of significant symptoms):  Risk status (Self / Other harm or suicidal ideation)  Patient has had a history of suicidal ideation: passive ideation noted previously and suicide attempts: previous noted attempted in 2021 ETOH/cut wrists  Patient denies current fears or concerns for personal safety.  Patient denies current or recent suicidal ideation or behaviors.  Patient denies current or recent homicidal ideation or behaviors.  Patient denies current or recent self injurious behavior or ideation.  Patient denies other safety concerns.  A safety and risk management plan has not been developed at this time, however patient was encouraged to call [facility] should there be a change in any of these risk factors.    Appearance:   Appropriate   Eye Contact:   Good   Psychomotor Behavior: Normal   Attitude:   Cooperative   Orientation:   All  Speech   Rate / Production: Normal    Volume:  Normal   Mood:    Anxious  Depressed     Affect:    Appropriate   Thought Content:  Clear   Thought Form:  Coherent  Logical   Insight:    Good     Diagnoses:  1. Major depressive disorder, recurrent episode, moderate degree (H)    2. Generalized anxiety disorder    3. Attention deficit hyperactivity disorder (ADHD), unspecified ADHD type        Collateral Reports Completed:  Communicated with: Dr Calle    Plan: (Homework, other):  Patient was given information about behavioral services and encouraged to schedule a follow up appointment with the clinic [facility] as needed.  She was also given information about mental health symptoms and treatment options .  CD Recommendations: No indications of CD issues.       Milagro Melgar [facility]    ______________________________________________________________________    Integrated Primary Care Behavioral Health Treatment Plan    Patient's Name: Brittany Rooney  YOB: 1965    Date of Creation: 2/9/24  Date Treatment Plan Last Reviewed/Revised: 2/9/24    DSM5 Diagnoses:   1. Major depressive disorder, recurrent episode, moderate degree (H)    2. Generalized anxiety disorder    3. Attention deficit hyperactivity disorder (ADHD), unspecified ADHD type      Psychosocial / Contextual Factors: interpersonal distress  PROMIS (reviewed every 90 days):     The following assessments were completed by patient for this visit:  PROMIS 10-Global Health (only subscores and total score):       8/11/2023     1:59 PM 10/9/2023     9:56 AM 10/24/2023     3:19 PM   PROMIS-10 Scores Only   Global Mental Health Score 6 5 7   Global Physical Health Score 15 15 16   PROMIS TOTAL - SUBSCORES 21 20 23       Referral / Collaboration:  Referral to another professional/service is not indicated at this time..    Anticipated number of session for this episode of care: 5-6  Anticipation frequency of session: Every other week  Anticipated Duration of each session: 16-37 minutes  Treatment plan will be reviewed in 90 days or when goals have  "been changed.       MeasurableTreatment Goal(s) related to diagnosis / functional impairment(s)  Goal 1: Patient will work on reducing anxiety via establishing boundaries    I will know I've met my goal when I have boundaries with family and friends.      Objective #A (Patient Action)    Patient will compile a list of boundaries that they would like to set with others. . .  Status: New - Date: 2/7/24      Intervention(s)  Therapist will provide support through CBT, MI, Acceptance and Commitment Therapy, Dialectic Behavioral Therapy and problem solving model to explore and overcome barriers.        Patient has reviewed and agreed to the above plan.      Milagro Melgar, [facility]  February 9, 2024      Integrated Behavioral Health Services    Patient's Name: Brittany Rooney  February 9, 2024    SAFETY PLAN:  Step 1: Warning signs / cues (Thoughts, images, mood, situation, behavior) that a crisis may be developing:  Thoughts: \"I don't matter\", \"People would be better off without me\", \"I can't do this anymore\", and \"I just want this to end\"  Images:  n/a  Thinking Processes: ruminations (can't stop thinking about my problems): . and intrusive thoughts (bothersome, unwanted thoughts that come out of nowhere): .  Mood: worsening depression, hopelessness, and agitation  Behaviors: using drugs, using alcohol, and not taking care of my responsibilities      Step 2: Coping strategies - Things I can do to take my mind off of my problems without contacting another person (relaxation technique, physical activity):  Distress Tolerance Strategies:  relaxation activities: crafts, change body temperature (ice pack/cold water) , and paced breathing/progressive muscle relaxation  Physical Activities: go for a walk, deep breathing, and stretching   Focus on helpful thoughts:  \"This is temporary\", \"I will get through this\", \"It always passes\", and \"Ride the wave\"  Reading, quilting, going for a walk    Step 3: People and social settings " that provide distraction:   Name: edilson Byrd Phone: in phone   My son    Step 4: Remind myself of people and things that are important to me and worth living for:  My son    Step 5: When I am in crisis, I can ask these people to help me use my safety plan:   Name: Rochelle Sauceda    My son       Step 6: Making the environment safe:   remove alcohol, remove drugs, and dispose of old medications     Step 7: Professionals or agencies I can contact during a crisis:  Suicide Prevention Lifeline: 9-202-663-PCTG (0606)  Crisis Text Line Service: Text   [place]  to 828806.  Local Crisis Services: 988    Call 911 or go to my nearest emergency department.   I helped develop this safety plan and agree to use it when needed.  I have been given a copy of this plan.      Patient signature: _______________________________________________________________  Today s date:  February 9, 2024    Adapted from Safety Plan Template 2008 Dana Palencia and Shiraz Flower is reprinted with the express permission of the authors.  No portion of the Safety Plan Template may be reproduced without the express, written permission.  You can contact the authors at bhs@[email or web address] or [email or web address].    Aftercare Plan      Things I am able to do on my own to cope or help me feel better: watching a favorite tv show or movie, listening to music I enjoy, going outside and breathing fresh air, going for a walk or exercising, taking a shower or bath, a cold or hot beverage, a healthy snack, drawing/coloring/painting, journaling, singing or dancing, deep breathing     I can try practicing square breathing when I begin to feel anxious - inhale through the nose for the count of 4 and the first line on the square. Exhale through the mouth for the count of 4 for the second line of the square. Repeat to complete the square. Repeat the square as many times as needed.    I can also use my five senses to practice mindfulness and grounding. What are five  "things I can see, four things I can hear, three things I can feel, two things I can smell, and one thing I can taste.     Things that I am able to do with others to cope or help me feel better: sometimes just talking or spending time with someone else, sharing a meal or having coffee, watching a movie or playing a game, going for a walk or exercising    I can also use community resources including mental health hotlines, [facility] crisis teams, or apps.     Things I can use or do for distraction: movies/tv, music, reading, games, drawing/coloring/painting or other art, essential oils, exercise, cleaning/organizing, puzzles, crossword puzzles, word search, Sudoku       I can also download a meditation or relaxation yesenia, like Calm, Headspace, or Insight Timer (all three offer a free version)    A great website resource is Change to Chill with active coping skills    Changes I can make to support my mental health and wellness: Attend scheduled mental health therapy and psychiatric appointments. Take my medications as prescribed. Maintain a daily schedule/routine. Abstain from all mood altering substances, including drugs, alcohol, or medications not currently prescribed to me. Implement a self-care routine.      Your [facility] has a mental health crisis team you can call 24/7: [facility] Adult, [phone]    Other things that are important when I m in crisis: to remember that the feelings I am having right now are temporary, and it won't feel like this forever, and that it is okay and important to ask for help    Community Resources  Fast Tracker  Linking people to mental health and substance use disorder resources  fasttrackermn.org     [place] Mental Health Warm Line  Peer to peer support  Monday thru Saturday, 12 pm to 10 pm  041.200.3974 or 7.203.174.5546  Text \"Support\" to 72746    National Marked Tree on Mental Illness (QUE)  963.499.5032 or 1.888.QUE.HELPS      Mental Health Apps  My3  " https://myHotelicopterpp.org/    VirtualHopeBox  https://Ascenergy/apps/virtual-hope-box/      Crisis beds are short term community residential facilities that provide 1-10 day stabilization services.  You can self refer via calling them and inquiring into current openings.  This can be an alternative to hospitalization if you are able to be safe within the community.  This is a voluntary stay.  Some options in the metro include:    [facility]  ([place]) [phone]  [facility] ( [facility]) 107.899.9917  [facility] ([place]) [phone]  [facility] ([facility]) [phone]  [facility] ([place]) [phone]  [facility] Will[facility] ([place]) [phone]  [facility] ([facility]) [phone]    If you feel like you are in need of more emergent support for safety concerns you can present to your local emergency room for a mental health evaluation.  Once you met with a mental health clinician and emergency room health care provider they will provide level of care recommendations and next steps.    Local Emergency Rooms can include:  [facility] ([facility]) in [facility] ([facility] ) in [facility] in [place].  This [facility] also supports EmPATH   EmPATH (Emergency Psychiatric Assessment, Treatment, and Healing) mental health unit. EmPATH is an innovative approach to emergency mental health care that is designed to guide people safely through a crisis while helping them build coping skills for the future. The unit is designed for acute psychiatric patients to receive assessment and evaluation in a therapeutic and least restrictive setting.  [facility]) in [facility]) in [place]

## 2024-02-09 ENCOUNTER — VIRTUAL VISIT (OUTPATIENT)
Dept: PSYCHIATRY | Facility: CLINIC | Age: 59
End: 2024-02-09
Payer: COMMERCIAL

## 2024-02-09 ENCOUNTER — VIRTUAL VISIT (OUTPATIENT)
Dept: BEHAVIORAL HEALTH | Facility: CLINIC | Age: 59
End: 2024-02-09
Payer: COMMERCIAL

## 2024-02-09 ENCOUNTER — MYC REFILL (OUTPATIENT)
Dept: PSYCHIATRY | Facility: CLINIC | Age: 59
End: 2024-02-09
Payer: COMMERCIAL

## 2024-02-09 ENCOUNTER — TELEPHONE (OUTPATIENT)
Dept: PSYCHIATRY | Facility: CLINIC | Age: 59
End: 2024-02-09
Payer: COMMERCIAL

## 2024-02-09 DIAGNOSIS — F33.1 MAJOR DEPRESSIVE DISORDER, RECURRENT EPISODE, MODERATE DEGREE (H): Primary | ICD-10-CM

## 2024-02-09 DIAGNOSIS — F39 MOOD DISORDER (H): ICD-10-CM

## 2024-02-09 DIAGNOSIS — F39 MOOD DISORDER (H): Primary | ICD-10-CM

## 2024-02-09 DIAGNOSIS — F90.9 ATTENTION DEFICIT HYPERACTIVITY DISORDER (ADHD), UNSPECIFIED ADHD TYPE: ICD-10-CM

## 2024-02-09 DIAGNOSIS — F41.1 GENERALIZED ANXIETY DISORDER: ICD-10-CM

## 2024-02-09 DIAGNOSIS — F43.22 ADJUSTMENT DISORDER WITH ANXIETY: ICD-10-CM

## 2024-02-09 DIAGNOSIS — F41.9 ANXIETY: ICD-10-CM

## 2024-02-09 PROCEDURE — 99442 PR PHYSICIAN TELEPHONE EVALUATION 11-20 MIN: CPT | Mod: 93 | Performed by: PSYCHIATRY & NEUROLOGY

## 2024-02-09 PROCEDURE — 90832 PSYTX W PT 30 MINUTES: CPT | Mod: 95 | Performed by: COUNSELOR

## 2024-02-09 RX ORDER — DEXTROAMPHETAMINE SACCHARATE, AMPHETAMINE ASPARTATE, DEXTROAMPHETAMINE SULFATE AND AMPHETAMINE SULFATE 2.5; 2.5; 2.5; 2.5 MG/1; MG/1; MG/1; MG/1
10 TABLET ORAL DAILY PRN
Qty: 30 TABLET | Refills: 0 | Status: SHIPPED | OUTPATIENT
Start: 2024-02-26 | End: 2024-03-27

## 2024-02-09 RX ORDER — DEXTROAMPHETAMINE SACCHARATE, AMPHETAMINE ASPARTATE, DEXTROAMPHETAMINE SULFATE AND AMPHETAMINE SULFATE 2.5; 2.5; 2.5; 2.5 MG/1; MG/1; MG/1; MG/1
10 TABLET ORAL DAILY PRN
Qty: 30 TABLET | Refills: 0 | Status: SHIPPED | OUTPATIENT
Start: 2024-04-28 | End: 2024-07-10 | Stop reason: DRUGHIGH

## 2024-02-09 RX ORDER — LAMOTRIGINE 100 MG/1
100 TABLET ORAL 2 TIMES DAILY
Qty: 180 TABLET | Refills: 0 | OUTPATIENT
Start: 2024-02-09

## 2024-02-09 RX ORDER — FLUOXETINE 40 MG/1
40 CAPSULE ORAL DAILY
Qty: 90 CAPSULE | Refills: 1 | Status: SHIPPED | OUTPATIENT
Start: 2024-02-09 | End: 2024-07-10

## 2024-02-09 RX ORDER — LISDEXAMFETAMINE DIMESYLATE 50 MG/1
50 CAPSULE ORAL DAILY
Qty: 30 CAPSULE | Refills: 0 | Status: SHIPPED | OUTPATIENT
Start: 2024-04-11 | End: 2024-05-15

## 2024-02-09 RX ORDER — DEXTROAMPHETAMINE SACCHARATE, AMPHETAMINE ASPARTATE, DEXTROAMPHETAMINE SULFATE AND AMPHETAMINE SULFATE 2.5; 2.5; 2.5; 2.5 MG/1; MG/1; MG/1; MG/1
10 TABLET ORAL DAILY PRN
Qty: 30 TABLET | Refills: 0 | Status: SHIPPED | OUTPATIENT
Start: 2024-03-28 | End: 2024-04-27

## 2024-02-09 RX ORDER — LISDEXAMFETAMINE DIMESYLATE 50 MG/1
50 CAPSULE ORAL DAILY
Qty: 30 CAPSULE | Refills: 0 | Status: SHIPPED | OUTPATIENT
Start: 2024-03-11 | End: 2024-04-10

## 2024-02-09 RX ORDER — LAMOTRIGINE 100 MG/1
100 TABLET ORAL 2 TIMES DAILY
Qty: 180 TABLET | Refills: 0 | Status: SHIPPED | OUTPATIENT
Start: 2024-02-09 | End: 2024-06-03

## 2024-02-09 RX ORDER — LISDEXAMFETAMINE DIMESYLATE 50 MG/1
50 CAPSULE ORAL DAILY
Qty: 30 CAPSULE | Refills: 0 | Status: SHIPPED | OUTPATIENT
Start: 2024-02-09 | End: 2024-03-10

## 2024-02-09 ASSESSMENT — ANXIETY QUESTIONNAIRES
GAD7 TOTAL SCORE: 17
4. TROUBLE RELAXING: MORE THAN HALF THE DAYS
5. BEING SO RESTLESS THAT IT IS HARD TO SIT STILL: MORE THAN HALF THE DAYS
2. NOT BEING ABLE TO STOP OR CONTROL WORRYING: NEARLY EVERY DAY
1. FEELING NERVOUS, ANXIOUS, OR ON EDGE: NEARLY EVERY DAY
3. WORRYING TOO MUCH ABOUT DIFFERENT THINGS: MORE THAN HALF THE DAYS
5. BEING SO RESTLESS THAT IT IS HARD TO SIT STILL: MORE THAN HALF THE DAYS
2. NOT BEING ABLE TO STOP OR CONTROL WORRYING: NEARLY EVERY DAY
GAD7 TOTAL SCORE: 17
7. FEELING AFRAID AS IF SOMETHING AWFUL MIGHT HAPPEN: MORE THAN HALF THE DAYS
3. WORRYING TOO MUCH ABOUT DIFFERENT THINGS: MORE THAN HALF THE DAYS
6. BECOMING EASILY ANNOYED OR IRRITABLE: NEARLY EVERY DAY
1. FEELING NERVOUS, ANXIOUS, OR ON EDGE: NEARLY EVERY DAY
IF YOU CHECKED OFF ANY PROBLEMS ON THIS QUESTIONNAIRE, HOW DIFFICULT HAVE THESE PROBLEMS MADE IT FOR YOU TO DO YOUR WORK, TAKE CARE OF THINGS AT HOME, OR GET ALONG WITH OTHER PEOPLE: SOMEWHAT DIFFICULT
8. IF YOU CHECKED OFF ANY PROBLEMS, HOW DIFFICULT HAVE THESE MADE IT FOR YOU TO DO YOUR WORK, TAKE CARE OF THINGS AT HOME, OR GET ALONG WITH OTHER PEOPLE?: SOMEWHAT DIFFICULT
GAD7 TOTAL SCORE: 17
GAD7 TOTAL SCORE: 17
6. BECOMING EASILY ANNOYED OR IRRITABLE: NEARLY EVERY DAY
7. FEELING AFRAID AS IF SOMETHING AWFUL MIGHT HAPPEN: MORE THAN HALF THE DAYS
IF YOU CHECKED OFF ANY PROBLEMS ON THIS QUESTIONNAIRE, HOW DIFFICULT HAVE THESE PROBLEMS MADE IT FOR YOU TO DO YOUR WORK, TAKE CARE OF THINGS AT HOME, OR GET ALONG WITH OTHER PEOPLE: SOMEWHAT DIFFICULT
7. FEELING AFRAID AS IF SOMETHING AWFUL MIGHT HAPPEN: MORE THAN HALF THE DAYS
GAD7 TOTAL SCORE: 17
GAD7 TOTAL SCORE: 17
7. FEELING AFRAID AS IF SOMETHING AWFUL MIGHT HAPPEN: MORE THAN HALF THE DAYS
8. IF YOU CHECKED OFF ANY PROBLEMS, HOW DIFFICULT HAVE THESE MADE IT FOR YOU TO DO YOUR WORK, TAKE CARE OF THINGS AT HOME, OR GET ALONG WITH OTHER PEOPLE?: SOMEWHAT DIFFICULT
4. TROUBLE RELAXING: MORE THAN HALF THE DAYS

## 2024-02-09 ASSESSMENT — COLUMBIA-SUICIDE SEVERITY RATING SCALE - C-SSRS
5. HAVE YOU STARTED TO WORK OUT OR WORKED OUT THE DETAILS OF HOW TO KILL YOURSELF? DO YOU INTEND TO CARRY OUT THIS PLAN?: NO
REASONS FOR IDEATION LIFETIME: COMPLETELY TO END OR STOP THE PAIN (YOU COULDN'T GO ON LIVING WITH THE PAIN OR HOW YOU WERE FEELING)
1. IN THE PAST MONTH, HAVE YOU WISHED YOU WERE DEAD OR WISHED YOU COULD GO TO SLEEP AND NOT WAKE UP?: YES
TOTAL  NUMBER OF ABORTED OR SELF INTERRUPTED ATTEMPTS LIFETIME: NO
2. HAVE YOU ACTUALLY HAD ANY THOUGHTS OF KILLING YOURSELF?: NO
5. HAVE YOU STARTED TO WORK OUT OR WORKED OUT THE DETAILS OF HOW TO KILL YOURSELF? DO YOU INTEND TO CARRY OUT THIS PLAN?: YES
TOTAL  NUMBER OF ACTUAL ATTEMPTS LIFETIME: 1
6. HAVE YOU EVER DONE ANYTHING, STARTED TO DO ANYTHING, OR PREPARED TO DO ANYTHING TO END YOUR LIFE?: NO
TOTAL  NUMBER OF INTERRUPTED ATTEMPTS LIFETIME: NO
4. HAVE YOU HAD THESE THOUGHTS AND HAD SOME INTENTION OF ACTING ON THEM?: NO
ATTEMPT PAST THREE MONTHS: NO
4. HAVE YOU HAD THESE THOUGHTS AND HAD SOME INTENTION OF ACTING ON THEM?: YES
3. HAVE YOU BEEN THINKING ABOUT HOW YOU MIGHT KILL YOURSELF?: YES
1. HAVE YOU WISHED YOU WERE DEAD OR WISHED YOU COULD GO TO SLEEP AND NOT WAKE UP?: YES
ATTEMPT LIFETIME: YES
REASONS FOR IDEATION PAST MONTH: COMPLETELY TO END OR STOP THE PAIN (YOU COULDN'T GO ON LIVING WITH THE PAIN OR HOW YOU WERE FEELING)
2. HAVE YOU ACTUALLY HAD ANY THOUGHTS OF KILLING YOURSELF?: YES

## 2024-02-09 ASSESSMENT — PATIENT HEALTH QUESTIONNAIRE - PHQ9
SUM OF ALL RESPONSES TO PHQ QUESTIONS 1-9: 12
10. IF YOU CHECKED OFF ANY PROBLEMS, HOW DIFFICULT HAVE THESE PROBLEMS MADE IT FOR YOU TO DO YOUR WORK, TAKE CARE OF THINGS AT HOME, OR GET ALONG WITH OTHER PEOPLE: SOMEWHAT DIFFICULT
10. IF YOU CHECKED OFF ANY PROBLEMS, HOW DIFFICULT HAVE THESE PROBLEMS MADE IT FOR YOU TO DO YOUR WORK, TAKE CARE OF THINGS AT HOME, OR GET ALONG WITH OTHER PEOPLE: SOMEWHAT DIFFICULT

## 2024-02-09 ASSESSMENT — PAIN SCALES - GENERAL: PAINLEVEL: NO PAIN (0)

## 2024-02-09 NOTE — PATIENT INSTRUCTIONS
Treatment Plan:  Continue Adderall IR 10 mg IR daily as needed for ADHD  Continue fluoxetine 40 mg daily for mood and anxiety  Increase lamotrigine to 150 mg daily (after 2 weeks on 100 mg daily) and then increase to 200 mg daily for mood.  Continue to monitor for any rashes.  Continue quetiapine/Seroquel 25 mg at bedtime as needed for sleep  Increase Vyvanse to 50 mg daily as needed for ADHD.   Continue all other cares per primary care provider.   Continue all other medications as reviewed per electronic medical record today.   Safety plan reviewed. To the Emergency Department as needed or call after hours crisis line at [phone] or [phone]. [place] Crisis Text Line. Text [place] to 028579 or Suicide LifeLine Chat: suicidepreventionlifeline.org/chat  Consider psychotherapy as nonpharmacotherapy option. Strongly consider DBT.   Schedule an appointment with me in 3 months or sooner as needed. Call [facility] at [phone] to schedule.  Follow up with primary care provider as planned or for acute medical concerns.  Call the psychiatric nurse line with medication questions or concerns at [phone].  Oncolixhart may be used to communicate with your provider, but this is not intended to be used for emergencies.    Have previously discussed risks of stimulant medication including, but not limited to, decreased appetite, risk of tics (and that they may be lasting), trouble sleeping, cardiac risks such as increased heart rate and blood pressure, and rare risk of sudden cardiac death.  Also risk of addiction/tolerance/dependence.  Also have discussed newer evidence that came out November 2023 showing up to 23% increased risk for cardiovascular diseases-particularly high blood pressure and arterial sclerosis/hardening of the arteries.    Patient Education   Collaborative Care Psychiatry Service  What to Expect  Here's what to expect from your Collaborative Care Psychiatry Service (CCPS).   About  "CCPS  CCPS means 2 people work together to help you get better. You'll meet with a behavioral health clinician and a psychiatric doctor. A behavioral health clinician helps people with mental health problems by talking with them. A psychiatric doctor helps people by giving them medicine.  How it works  At every visit, you'll see the behavioral health clinician (BHC) first. They'll talk with you about how you're doing and teach you how to feel better.   Then you'll see the psychiatric doctor. This doctor can help you deal with troubling thoughts and feelings by giving you medicine. They'll make sure you know the plan for your care.   CCPS usually takes 3 to 6 visits. If you need more visits, we may have you start seeing a different psychiatric doctor for ongoing care.  If you have any questions or concerns, we'll be glad to talk with you.  About visits  Be open  At your visits, please talk openly about your problems. It may feel hard, but it's the best way for us to help you.  Cancelling visits  If you can't come to your visit, please call us right away at [phone]. If you don't cancel at least 24 hours (1 full day) before your visit, that's \"late cancellation.\"  Being late to visits  Being very late is the same as not showing up. You will be a \"no show\" if:  Your appointment starts with a BHC, and you're more than 15 minutes late for a 30-minute (half hour) visit. This will also cancel your appointment with the psychiatric doctor.  Your appointment is with a psychiatric doctor only, and you're more than 15 minutes late for a 30-minute (half hour) visit.  Your appointment is with a psychiatric doctor only, and you're more than 30 minutes late for a 60-minute (full hour) visit.  If you cancel late or don't show up 2 times within 6 months, we may end your care.   Getting help between visits  If you need help between visits, you can call us Monday to Friday from 8 a.m. to 4:30 p.m. at [phone].  Emergency " care  Call 911 or go to the nearest emergency department if your life or someone else's life is in danger.  Call 988 anytime to reach the national Suicide and Crisis hotline.  Medicine refills  To refill your medicine, call your pharmacy. You can also call [facility]'s Behavioral Access at [phone], Monday to Friday, 8 a.m. to 4:30 p.m. It can take 1 to 3 business days to get a refill.   Forms, letters, and tests  You may have papers to fill out, like FMLA, short-term disability, and workability. We can help you with these forms at your visits, but you must have an appointment. You may need more than 1 visit for this, to be in an intensive therapy program, or both.  Before we can give you medicine for ADHD, we may refer you to get tested for it or confirm it another way.  We may not be able to give you an emotional support animal letter.  We don't do mental health checks ordered by the court.   We don't do mental health testing, but we can refer you to get tested.   Thank you for choosing us for your care.  For informational purposes only. Not to replace the advice of your health care provider. Copyright   2022 [facility]. All rights reserved. YR Free 488211 - 12/22.

## 2024-02-09 NOTE — TELEPHONE ENCOUNTER
Refused Prescriptions:                       Disp   Refills    lamoTRIgine (LAMICTAL) 100 MG tablet       180 ta*0        Sig: Take 1 tablet (100 mg) by mouth 2 times daily Take           150 mg daily for first 2 weeks.    Last visit treatment plan: NA    []Medication refilled per  Medication Refill in Ambulatory Care  policy.  [x]Medication unable to be refilled by RN due to criteria not met as indicated below:    []Eligibility - not seen in the last year   []Supervision - no future appointment   []Compliance - no shows, cancellations or lapse in therapy   []Verification - order discrepancy   []Controlled medication   []Medication not included in policy   []90-day supply request   [x]Other - Last Fill 2/9/24

## 2024-02-09 NOTE — TELEPHONE ENCOUNTER
Reason for call:  Medication   If this is a refill request, has the caller requested the refill from the pharmacy already? Yes  Will the patient be using a [facility]? No  Name of the pharmacy and phone number for the current request: [facility] [phone]    Name of the medication requested: Lamotrigine 100mg    Other request: pharmacy needs clarification on the instructions on the bottle, regarding a dose increase      Phone number to reach patient:  Other phone number: call pharmacy [phone]     Best Time:  any    Can we leave a detailed message on this number?  NO    Travel screening: Not Applicable         Lamotrigine

## 2024-02-09 NOTE — PROGRESS NOTES
"Virtual Visit Details    Type of service:  Video Visit     Originating Location (pt. Location): {video visit patient location:898809::\"Home\"}  {PROVIDER LOCATION On-site should be selected for visits conducted from your clinic location or adjoining [facility] hospital, academic office, or other nearby [facility] building. Off-site should be selected for all other provider locations, including home:[id number]}  Distant Location (provider location):  {virtual location provider:483214}  Platform used for Video Visit: {Virtual Visit Platforms:008100::\"ITS Compliance\"}  "

## 2024-02-09 NOTE — PROGRESS NOTES
"Brittany Rooney is a 58 year old year old who is being evaluated via a telephone video visit.      Telemedicine Visit: The patient's condition can be safely assessed and treated via a telephone telemedicine encounter.      Reason for Telemedicine Visit: Covid-19 Pandemic    Originating Site (Patient Location):  In parked car at work/[place]    Distant Site (Provider Location): Provider Remote Setting    Mode of Communication:  Telephone    As the provider I attest to compliance with applicable laws and regulations related to telemedicine.         Outpatient Psychiatric Progress Note    Name: Brittany Rooney   : 1965                    Primary Care Provider: Esther Armstrong MD   Therapist: None    PHQ-9 scores:      10/9/2023     9:55 AM 10/24/2023     3:18 PM 2024     7:28 AM   PHQ-9 SCORE   PHQ-9 Total Score MyChart 13 (Moderate depression) 14 (Moderate depression) 12 (Moderate depression)   PHQ-9 Total Score 13 14 12    12       GALE-7 scores:      10/9/2023     9:55 AM 10/24/2023     3:19 PM 2024     7:29 AM   GALE-7 SCORE   Total Score 17 (severe anxiety) 18 (severe anxiety) 17 (severe anxiety)   Total Score 17 18 17    17       Patient Identification:  Patient is a 58 year old,   White Not  or  female  who presents for return visit with me.  Patient is currently employed full time. Patient attended the phone/video session alone. Patient prefers to be called: \"Veronica\".    Interim History:  I last saw Brittany Rooney for outpatient psychiatry return visit on 2023. During that appointment, we:    Continue Adderall IR 10 mg IR daily as needed for ADHD  Continue fluoxetine 40 mg daily for mood and anxiety  Continue lamotrigine 100 mg daily for mood.   Continue quetiapine/Seroquel 25 mg at bedtime as needed for sleep  Discontinue Adderall XR since starting Vyvanse  Start Vyvanse 40 mg daily for ADHD    : Patient overall appreciating effects of lamotrigine.  Feels " "like it helps stabilize mood and irritability.  Appreciates Vyvanse as well but feels like it is not lasting very long or is not as effective starting around lunchtime even.  No acute safety concerns.  No SI.  No problematic drug or alcohol use.  See [facility] note below for additional details.    Per [facility], Milagro Melgar [facility], during today's team-based visit:   update:  Feels like Lamictal has been helpful has been out for 5 days.  Less internal agitation.  Likely less depressive sx.  Also out of Vyvanse 2 days.  Taking short term Adderall today.  Anxiety has been high.  Picking at things/restless.  Denies panic  Stresses: interpersonal distress, work-high school sports  Appetite: N/a  Sleep: Poor variable, recently increased  Outpatient Provider updates: [facility] Josi Cevallos [facility], previously recommended DBT  SI/SIB/HI:  Intermittent hx of passive ideation, no plan/intent, one noted previous attempt ETOH/cut wrists in 2021.  GOLDY: Recent ETOH abstance after increased concerns.  THC occasionally  Side effects/compliance: as above  Interventions:  [facility] discussed ADHD interventions/time mgmt  Most important:  Get back on meds, titrate back on Lamictal?  Wonders about increasing Vyvanse? And or adding Adderall short term    Past Psychiatric Med Trials:  Psych Meds at Intake:  Adderall XR 20 mg daily   fluoxetine 40 mg daily - \"I don't know if it does anything for me\" increased to 40 mg a year ago, no side effects  Quetiapine 25 mg at bedtime  - on 2 weeks, not sure if feel different      Past Psych Meds:  Sertraline - bad on stomach  Wellbutrin     Psychiatric ROS:  Brittany Rooney reports mood has been: Better/improved  Anxiety has been: Improving  Sleep has been: Recently increased  Susan sxs: None  Psychosis sxs: None  ADHD/ADD sxs: Improved  PTSD sxs: NA  PHQ9 and GAD7 scores were reviewed today if completed.   Medication side effects: Denies  Current stressors include: And see HPI above  Coping mechanisms and " supports include: Family, Hobbies, and Friends    Current medications include:   Current Outpatient Medications   Medication Sig    amphetamine-dextroamphetamine (ADDERALL XR) 20 MG 24 hr capsule Take 1 capsule (20 mg) by mouth daily    amphetamine-dextroamphetamine (ADDERALL) 10 MG tablet Take 1 tablet (10 mg) by mouth daily By 3 pm if needed for evening activites.    benzonatate (TESSALON) 100 MG capsule Take 1 capsule (100 mg) by mouth 3 times daily as needed for cough    Cholecalciferol (VITAMIN D) 2000 UNIT tablet Take 1 tablet by mouth 2 times daily.    clobetasol (TEMOVATE) 0.05 % external ointment APPLY TOPICALLY TO THE AFFECTED AREA TWICE DAILY (Patient not taking: Reported on 7/10/2023)    FLUoxetine (PROZAC) 40 MG capsule TAKE 1 CAPSULE(40 MG) BY MOUTH DAILY    IBUPROFEN 200 MG OR CAPS 2 CAPSULE EVERY 4 TO 6 HOURS AS NEEDED    lamoTRIgine (LAMICTAL) 100 MG tablet Take 1 tablet (100 mg) by mouth daily Last Rx until appt 2/7.    lamoTRIgine (LAMICTAL) 25 MG tablet Take 25 mg by mouth daily for 2 weeks then 50 mg daily for 2 weeks then 100 mg daily.    levothyroxine (SYNTHROID/LEVOTHROID) 125 MCG tablet Take 1 tablet (125 mcg) by mouth daily    lisdexamfetamine (VYVANSE) 40 MG capsule Take 1 capsule (40 mg) by mouth every morning    mometasone (ELOCON) 0.1 % external cream Apply  topically as needed.     No current facility-administered medications for this visit.       The [place] Prescription Monitoring Program has been reviewed and there are no concerns about diversionary activity for controlled substances at this time.   01/24/2024 01/07/2024 1 Dextroamp-Amphetamin 10 Mg Tab 30.00 30 Pa Kol [id number] [facility] (1802) 0/0  Comm Ins [place]   01/07/2024 01/07/2024 1 Lisdexamfetamine 40 Mg Capsule 30.00 30 Pa Kol [id number] [facility] (1802) 0/[place]   11/28/2023 10/09/2023 1 Dextroamp-Amphet Er 20 Mg Cap 30.00 30 Pa Kol [id number] [facility] (1802) 0/0  [place]   10/18/2023 09/22/2023 1 Lisdexamfetamine 40 Mg Capsule 15.00  15 [facility] [id number] [facility] (1802) 0/0  [place]   10/11/2023 10/09/2023 1 Dextroamp-Amphetamin 10 Mg Tab 30.00 30 Pa Victor Manuel [id number] [facility] (1802) 0/0  [place]       Past Medical/Surgical History:  Past Medical History:   Diagnosis Date    Abnormal Pap smear of cervix 01/22/2019    see problem list    Attention deficit disorder with hyperactivity(314.01)     Major depressive disorder, recurrent episode, unspecified     Morbid obesity (H)     Nontoxic uninodular goiter     Other malaise and fatigue     Other malaise and fatigue     Plantar fascial fibromatosis     Thyroid nodule       has a past medical history of Abnormal Pap smear of cervix (01/22/2019), Attention deficit disorder with hyperactivity(314.01), Major depressive disorder, recurrent episode, unspecified, Morbid obesity (H), Nontoxic uninodular goiter, Other malaise and fatigue, Other malaise and fatigue, Plantar fascial fibromatosis, and Thyroid nodule.    She has no past medical history of Arthritis, Cancer (H), Cerebral infarction (H), Congestive heart failure (H), COPD (chronic obstructive pulmonary disease) (H), Depressive disorder, Diabetes (H), Heart disease, History of blood transfusion, Hypertension, or Uncomplicated asthma.    Social History:  Reviewed. No changes to social history except as noted above in HPI.    Vital Signs:   None. This is phone/video visit.     Labs:  Most recent laboratory results reviewed and no new labs.     Review of Systems:  10 systems (general, cardiovascular, respiratory, eyes, ENT, endocrine, GI, , M/S, neurological) were reviewed. Most pertinent finding(s) is/are:  denies fever, cough, persistent headaches, shortness of breath, chest pain, severe GI symptoms, trouble urinating, severe pain. The remaining systems are all unremarkable.    Mental Status Examination (limited as this is by phone/video):  Appearance: Awake, alert, appears stated age, no acute distress, well-groomed   Attitude:  cooperative, pleasant    Motor: No gross abnormalities observed via video, not formally tested   Oriented to:  person, place, time, and situation  Attention Span and Concentration:  normal  Speech:  clear, coherent, regular rate, rhythm, and volume  Language: intact  Mood: Better  Affect:  appropriate and in normal range and mood congruent  Associations:  no loose associations  Thought Process:  logical, linear and goal oriented  Thought Content:  no evidence of suicidal ideation or homicidal ideation, no evidence of psychotic thought, no auditory hallucinations present and no visual hallucinations present  Recent and Remote Memory:  Intact to interview. Not formally assessed. No amnesia.  Fund of Knowledge: appropriate  Insight:  good  Judgment:  intact, adequate for safety  Impulse Control:  intact    Suicide Risk Assessment:  Today Brittany Rooney reports no suicidal ideation. Based on all available evidence including the factors cited above, Brittany Rooney does not appear to be at imminent risk for self-harm, does not meet criteria for a 72-hr hold, and therefore remains appropriate for ongoing outpatient level of care.  A thorough assessment of risk factors related to suicide and self-harm have been reviewed and are noted above. The patient convincingly denies suicidality on several occasions. Local community safety resources reviewed for patient to use if needed. There was no deceit detected, and the patient presented in a manner that was believable.     DSM5 Diagnosis:  Attention-Deficit/Hyperactivity Disorder  314.01 (F90.9) Unspecified Attention -Deficit / Hyperactivity Disorder  Mood Disorder, Unspecified  Anxiety, Unspecified  R/O Borderline Personality Disorder    Medical comorbidities include:   Patient Active Problem List    Diagnosis Date Noted    ASCUS of cervix with negative high risk HPV 01/22/2019     Priority: Medium     2013, 2016 NIL paps  1/22/19 ASCUS pap, neg HPV. Plan: cotest in 3 years.  7/10/23 LSIL,  +HR HPV (not 16/18). Plan: cotest in 1 year due 07/10/24  07/17/23 Left msg and Mychart result note sent. Pt notified via mychart            Eczema of both hands 12/22/2016     Priority: Medium    Seborrheic keratosis 11/17/2015     Priority: Medium    Vitamin D deficiency disease 02/25/2013     Priority: Medium    Contact dermatitis 02/19/2013     Priority: Medium    Obesity 02/19/2013     Priority: Medium    Adjustment disorder with anxiety 06/11/2011     Priority: Medium    ADD (attention deficit disorder) 06/05/2009     Priority: Medium    Hyperlipidemia LDL goal <130 12/03/2008     Priority: Medium    Hypothyroidism 11/25/2008     Priority: Medium    Malaise and fatigue      Priority: Medium     Problem list name updated by automated process. Provider to review         Psychosocial & Contextual Factors: see HPI above    Assessment:  From Intake, 8/11/2023:  Brittany Rooney is a 56 yo with past psychiatric hx including depression, anxiety, and ADHD who presents today for psychiatric evaluation. Pt with long hx of mental health struggles. Struggling significantly with mood regulation lately and has questioned possible mood disorder vs borderline personality disorder (BPD). Pt with many features of BPD after discussion of criteria and review of her hx and current struggles. Pt will read more about the dx. Pt agreeable to start a trial with lamotrigine to see if helpful for mood stabilization. Also recommend DBT and resources provided today. If lamotrigine really helpful, could consider tapering off fluoxetine. Passive thoughts of death/suicide but no plan or intent to harm self. Denies current problematic drug or alcohol use. Uses cannabis but stopped using alcohol about 4-6 weeks ago.      9/22/2023:  Discussed patient may benefit from Vyvanse trial over Adderall XR.  We will start trial with Vyvanse 40 mg daily in place of Adderall XR dose to see if more effective and better tolerated.  Could consider  continued titration of lamotrigine at next visit.  Strongly recommend therapy/consideration for DBT.  Resources previously sent for DBT.  No acute safety concerns.  No SI.  No problematic drug or alcohol use.    2/9/2024:  Patient overall appreciating effects of lamotrigine.  Patient would like to continue to optimize lamotrigine since it does help stabilize mood and irritability some.  No negative side effects.  Patient will continue at 100 mg daily for the next 2 weeks and then increase to 150 mg for 2 weeks then 200 mg daily.  Patient also feeling like Vyvanse 40 mg has had some waning effects.  No chest pains, racing heart, or tics.  We will increase to 50 mg daily to see if more helpful and if effects last a little bit longer than lunchtime.  Discussed needing blood pressure readings, especially with increased dose of Vyvanse.  No acute safety concerns.  No SI.  No problematic drug or alcohol use.    Medication side effects and alternatives were reviewed. Health promotion activities recommended and reviewed today. All questions addressed. Education and counseling completed regarding risks and benefits of medications and psychotherapy options. Recommend therapy for additional support.     Treatment Plan:  Continue Adderall IR 10 mg IR daily as needed for ADHD  Continue fluoxetine 40 mg daily for mood and anxiety  Increase lamotrigine to 150 mg daily (after 2 weeks on 100 mg daily) and then increase to 200 mg daily for mood.  Continue to monitor for any rashes.  Continue quetiapine/Seroquel 25 mg at bedtime as needed for sleep  Increase Vyvanse to 50 mg daily as needed for ADHD.   Continue all other cares per primary care provider.   Continue all other medications as reviewed per electronic medical record today.   Safety plan reviewed. To the Emergency Department as needed or call after hours crisis line at [phone] or [phone]. [place] Crisis Text Line. Text [place] to 222958 or Suicide LifeLine Chat:  suicidepreventionlifeline.org/chat  Consider psychotherapy as nonpharmacotherapy option. Strongly consider DBT.   Schedule an appointment with me in 3 months or sooner as needed. Call [facility] at [phone] to schedule.  Follow up with primary care provider as planned or for acute medical concerns.  Call the psychiatric nurse line with medication questions or concerns at [phone].  MyChart may be used to communicate with your provider, but this is not intended to be used for emergencies.    Have previously discussed risks of stimulant medication including, but not limited to, decreased appetite, risk of tics (and that they may be lasting), trouble sleeping, cardiac risks such as increased heart rate and blood pressure, and rare risk of sudden cardiac death.  Also risk of addiction/tolerance/dependence.  Also have discussed newer evidence that came out November 2023 showing up to 23% increased risk for cardiovascular diseases-particularly high blood pressure and arterial sclerosis/hardening of the arteries.    Administrative Billing:   Phone Call/Video Duration: 15 Minutes  Start: 8:01a  Stop: 8:16a    Patient Status:  Patient will continue to be seen for ongoing consultation and stabilization.    Signed:   Jossy Calle DO  [facility]    Disclaimer: This note consists of symbols derived from keyboarding, dictation and/or voice recognition software. As a result, there may be errors in the script that have gone undetected. Please consider this when interpreting information found in this chart.

## 2024-02-09 NOTE — TELEPHONE ENCOUNTER
Pharmacy calling with questions on directions for lamotrigine. Per visit note today:    Patient will continue at 100 mg daily for the next 2 weeks and then increase to 150 mg for 2 weeks then 200 mg daily.     Called and clarified the order.     Fanta Salvador RN on 2/9/2024 at 2:11 PM

## 2024-02-09 NOTE — NURSING NOTE
Is the patient currently in the state of [place]? YES    Visit mode:VIDEO    If the visit is dropped, the patient can be reconnected by: VIDEO VISIT: Text to cell phone:   Telephone Information:   Mobile [phone]       Will anyone else be joining the visit? NO  (If patient encounters technical issues they should call [phone] :[phone])    How would you like to obtain your AVS? MyChart    Are changes needed to the allergy or medication list? Pt stated no changes to allergies and Pt stated no med changes    Reason for visit: SEVERIANO HUANG

## 2024-02-26 ENCOUNTER — MYC REFILL (OUTPATIENT)
Dept: FAMILY MEDICINE | Facility: CLINIC | Age: 59
End: 2024-02-26
Payer: COMMERCIAL

## 2024-02-26 DIAGNOSIS — E03.4 HYPOTHYROIDISM DUE TO ACQUIRED ATROPHY OF THYROID: ICD-10-CM

## 2024-02-26 RX ORDER — LEVOTHYROXINE SODIUM 125 UG/1
125 TABLET ORAL DAILY
Qty: 30 TABLET | Refills: 0 | Status: SHIPPED | OUTPATIENT
Start: 2024-02-26 | End: 2024-07-19

## 2024-02-27 DIAGNOSIS — E03.4 HYPOTHYROIDISM DUE TO ACQUIRED ATROPHY OF THYROID: ICD-10-CM

## 2024-02-27 RX ORDER — LEVOTHYROXINE SODIUM 125 UG/1
125 TABLET ORAL DAILY
Qty: 90 TABLET | OUTPATIENT
Start: 2024-02-27

## 2024-03-22 ENCOUNTER — TELEPHONE (OUTPATIENT)
Dept: PSYCHIATRY | Facility: CLINIC | Age: 59
End: 2024-03-22
Payer: COMMERCIAL

## 2024-03-22 NOTE — TELEPHONE ENCOUNTER
[facility] - the patient is due to be seen 3 months after 2/9/2024 appointment with Dr. Calle.     Mary Ramey RN on 3/22/2024 at 1:28 PM

## 2024-03-22 NOTE — TELEPHONE ENCOUNTER
1) RN received refill request from    [id number] - [place], [place] - [address] AT [facility] [address]  WRITTEN PRESCRIPTION REQUESTED for lamoTRIgine (LAMICTAL) 100 MG tablet       2) This medication was last prescribed on 2/9/2024 for qty of 180 with 0 refill(s).     3) Per [place] ,  was not checked this medicaiotn is not monitored      4) Pt should not need a new prescription until around 5/9/2024.    5) Therefore, RN sent reply back to pharmacy that refill request will be DENIED - should have refills on file and enough medication until the beginning of May  2024.    6) Pt also does not have follow-up appointment scheduled.     7) RN routing for [facility] to assist pt in making appointment - the patient is due to be seen 3 months after 2/9/2024 appointment with Dr. Calle.     Mary Ramey RN on 3/22/2024 at 1:28 PM      .

## 2024-03-26 ENCOUNTER — TELEPHONE (OUTPATIENT)
Dept: PSYCHIATRY | Facility: CLINIC | Age: 59
End: 2024-03-26
Payer: COMMERCIAL

## 2024-03-26 NOTE — TELEPHONE ENCOUNTER
Received refill request for lamotrigine 100 mg. This was ordered on 2/9/24 for a 90 day supply. Too soon for a refill.     Fanta Salvador RN on 3/26/2024 at 10:08 AM

## 2024-04-22 ENCOUNTER — TELEPHONE (OUTPATIENT)
Dept: FAMILY MEDICINE | Facility: CLINIC | Age: 59
End: 2024-04-22

## 2024-04-22 NOTE — TELEPHONE ENCOUNTER
4/22 - LVM- Provider will be out of clinic. 6/27 virtual appointment has been cancelled. Need to reschedule. SD OK

## 2024-05-15 ENCOUNTER — MYC REFILL (OUTPATIENT)
Dept: PSYCHIATRY | Facility: CLINIC | Age: 59
End: 2024-05-15
Payer: COMMERCIAL

## 2024-05-15 DIAGNOSIS — F90.9 ATTENTION DEFICIT HYPERACTIVITY DISORDER (ADHD), UNSPECIFIED ADHD TYPE: ICD-10-CM

## 2024-05-16 RX ORDER — LISDEXAMFETAMINE DIMESYLATE 50 MG/1
50 CAPSULE ORAL DAILY
Qty: 30 CAPSULE | Refills: 0 | Status: SHIPPED | OUTPATIENT
Start: 2024-05-16 | End: 2024-07-10 | Stop reason: ALTCHOICE

## 2024-05-16 NOTE — TELEPHONE ENCOUNTER
Date of Last Office Visit: 2/9/24  Date of Next Office Visit: NONE - RN forwarding encounter to [facility] for scheduling   No shows since last visit: 0  Cancellations since last visit: 0    Medication requested: lisdexamfetamine (VYVANSE) 50 MG capsule Date last ordered: 4/11/24 Qty: 30 Refills: 3 Month panel     Review of [place] ?: lisdexamfetamine (VYVANSE) 50 MG capsule   Medication last sold date: 4/15/24 Qty filled: 30  Other controlled substance on [place] ?: Dextroamp-Amphetamin 10 Mg Tab, Lisdexamfetamine 40 Mg Capsule  If yes, is this a new medication?: No  If yes, name of medication: NA and date filled: NA    Lapse in medication adherence greater than 5 days?: No  If yes, call patient and gather details: NA  Medication refill request verified as identical to current order?: Yes  Result of Last DAM, VPA, Li+ Level, CBC, or Carbamazepine Level (at or since last visit): N/A    Last visit treatment plan: 2/9/24    Return in about 3 months (around 5/9/2024).  Treatment Plan:  Continue Adderall IR 10 mg IR daily as needed for ADHD  Continue fluoxetine 40 mg daily for mood and anxiety  Increase lamotrigine to 150 mg daily (after 2 weeks on 100 mg daily) and then increase to 200 mg daily for mood.  Continue to monitor for any rashes.  Continue quetiapine/Seroquel 25 mg at bedtime as needed for sleep  Increase Vyvanse to 50 mg daily as needed for ADHD.   Continue all other cares per primary care provider.   Continue all other medications as reviewed per electronic medical record today.   Safety plan reviewed. To the Emergency Department as needed or call after hours crisis line at [phone] or [phone]. [place] Crisis Text Line. Text [place] to 480499 or Suicide LifeLine Chat: suicidepreventionlifeline.org/chat  Consider psychotherapy as nonpharmacotherapy option. Strongly consider DBT.   Schedule an appointment with me in 3 months or sooner as needed. Call [facility] at [phone] to  schedule.  Follow up with primary care provider as planned or for acute medical concerns.  Call the psychiatric nurse line with medication questions or concerns at [phone].  iRewindhart may be used to communicate with your provider, but this is not intended to be used for emergencies.          []Medication refilled per  Medication Refill in Ambulatory Care  policy.  [x]Medication unable to be refilled by RN due to criteria not met as indicated below:    []Eligibility - not seen in the last year   [x]Supervision - no future appointment   []Compliance - no shows, cancellations or lapse in therapy   []Verification - order discrepancy   [x]Controlled medication   []Medication not included in policy   []90-day supply request   []Other

## 2024-06-03 ENCOUNTER — MYC REFILL (OUTPATIENT)
Dept: PSYCHIATRY | Facility: CLINIC | Age: 59
End: 2024-06-03
Payer: COMMERCIAL

## 2024-06-03 ENCOUNTER — MYC REFILL (OUTPATIENT)
Dept: FAMILY MEDICINE | Facility: CLINIC | Age: 59
End: 2024-06-03
Payer: COMMERCIAL

## 2024-06-03 DIAGNOSIS — L30.9 ECZEMA OF BOTH HANDS: ICD-10-CM

## 2024-06-03 DIAGNOSIS — F39 MOOD DISORDER (H): ICD-10-CM

## 2024-06-03 RX ORDER — LAMOTRIGINE 200 MG/1
200 TABLET ORAL DAILY
Qty: 30 TABLET | Refills: 0 | Status: SHIPPED | OUTPATIENT
Start: 2024-06-03 | End: 2024-07-10

## 2024-06-03 RX ORDER — CLOBETASOL PROPIONATE 0.5 MG/G
OINTMENT TOPICAL 2 TIMES DAILY
Qty: 30 G | Refills: 0 | Status: SHIPPED | OUTPATIENT
Start: 2024-06-03 | End: 2024-08-08

## 2024-06-03 RX ORDER — LAMOTRIGINE 200 MG/1
200 TABLET ORAL DAILY
Qty: 30 TABLET | Refills: 0 | OUTPATIENT
Start: 2024-06-03

## 2024-06-03 NOTE — TELEPHONE ENCOUNTER
Date of Last Office Visit: 2/9/2024  Date of Next Office Visit: nothing scheduled - [facility] notified   No shows since last visit: none  Cancellations since last visit: none    Medication requested: lamotrigine (LAMICTAL) 100 MG tablet  Date last ordered: 2/9/2024 Qty: 180 Refills: 0  Take 1 tablet (100 mg) by mouth 2 times daily Take 150 mg daily for first 2 weeks   A 90-day supply was dispensed on 3/25/2024 per EPIC dispense report      Review of [place] ?: n/a for this medication    Lapse in medication adherence greater than 5 days?: No  If yes, call patient and gather details: n/a  Medication refill request verified as identical to current order?: yes  Result of Last DAM, VPA, Li+ Level, CBC, or Carbamazepine Level (at or since last visit):  No lamotrigine (Lamictal) level on file.    Last visit treatment plan:   2/9/2024:  Patient overall appreciating effects of lamotrigine.  Patient would like to continue to optimize lamotrigine since it does help stabilize mood and irritability some.  No negative side effects.  Patient will continue at 100 mg daily for the next 2 weeks and then increase to 150 mg for 2 weeks then 200 mg daily.  Patient also feeling like Vyvanse 40 mg has had some waning effects.  No chest pains, racing heart, or tics.  We will increase to 50 mg daily to see if more helpful and if effects last a little bit longer than lunchtime.  Discussed needing blood pressure readings, especially with increased dose of Vyvanse.  No acute safety concerns.  No SI.  No problematic drug or alcohol use.     Medication side effects and alternatives were reviewed. Health promotion activities recommended and reviewed today. All questions addressed. Education and counseling completed regarding risks and benefits of medications and psychotherapy options. Recommend therapy for additional support.      Treatment Plan:  Continue Adderall IR 10 mg IR daily as needed for ADHD  Continue fluoxetine 40 mg daily for mood and  anxiety  Increase lamotrigine to 150 mg daily (after 2 weeks on 100 mg daily) and then increase to 200 mg daily for mood.  Continue to monitor for any rashes.  Continue quetiapine/Seroquel 25 mg at bedtime as needed for sleep  Increase Vyvanse to 50 mg daily as needed for ADHD.   Continue all other cares per primary care provider.   Continue all other medications as reviewed per electronic medical record today.   Safety plan reviewed. To the Emergency Department as needed or call after hours crisis line at [phone] or [phone]. [place] Crisis Text Line. Text [place] to 870410 or Suicide LifeLine Chat: suicidepreventionlifeline.org/chat  Consider psychotherapy as nonpharmacotherapy option. Strongly consider DBT.   Schedule an appointment with me in 3 months or sooner as needed. Call [facility] at [phone] to schedule.  Follow up with primary care provider as planned or for acute medical concerns.  Call the psychiatric nurse line with medication questions or concerns at [phone].  [employer] may be used to communicate with your provider, but this is not intended to be used for emergencies.    []Medication refilled per  Medication Refill in Ambulatory Care  policy.  [x]Medication unable to be refilled by RN due to criteria not met as indicated below:    []Eligibility - not seen in the last year   []Supervision - no future appointment   []Compliance - no shows, cancellations or lapse in therapy   []Verification - order discrepancy   []Controlled medication   [x]Medication not included in policy   [x]90-day supply request   []Other      A 90-day refill is pended for Dr. Calle to review.    Mary Ramey RN on 6/3/2024 at 11:17 AM

## 2024-06-12 ENCOUNTER — MYC REFILL (OUTPATIENT)
Dept: PSYCHIATRY | Facility: CLINIC | Age: 59
End: 2024-06-12
Payer: COMMERCIAL

## 2024-06-12 DIAGNOSIS — F90.9 ATTENTION DEFICIT HYPERACTIVITY DISORDER (ADHD), UNSPECIFIED ADHD TYPE: ICD-10-CM

## 2024-06-13 ENCOUNTER — MYC MEDICAL ADVICE (OUTPATIENT)
Dept: PSYCHIATRY | Facility: CLINIC | Age: 59
End: 2024-06-13
Payer: COMMERCIAL

## 2024-06-13 RX ORDER — DEXTROAMPHETAMINE SACCHARATE, AMPHETAMINE ASPARTATE, DEXTROAMPHETAMINE SULFATE AND AMPHETAMINE SULFATE 2.5; 2.5; 2.5; 2.5 MG/1; MG/1; MG/1; MG/1
10 TABLET ORAL DAILY PRN
Qty: 30 TABLET | Refills: 0 | OUTPATIENT
Start: 2024-06-13

## 2024-06-13 NOTE — TELEPHONE ENCOUNTER
RN received a refill request throught [facility] from [facility] for Adderall 10 mg tablet.  This refill request was  DENIED  for the following reasons:    1.  The patient has one refill on file and available for the patient to  today.      2.  RN phoned [id number] - [place], [place] - [address] AT [facility] [address] and spoke to the Pharmacist.  The Pharmacist indicated the patient has 1 refill on file and available for pickup.  The Pharmacist is preparing the refill and it will be available to pickup after supper today.      3. RN phoned the patient.  There was no answer.  RN LVM that the medication she is requesting be refilled has 1 refill available.  RN instructed the patient to call her pharmacy.      4.  RN sent the patient a [employer] message.  Please refer to [place] encounter dated 6/13/24.      Fam Grace RN on 6/13/2024 at 11:08 AM

## 2024-06-25 ENCOUNTER — PATIENT OUTREACH (OUTPATIENT)
Dept: FAMILY MEDICINE | Facility: CLINIC | Age: 59
End: 2024-06-25
Payer: COMMERCIAL

## 2024-06-25 DIAGNOSIS — R87.610 ASCUS OF CERVIX WITH NEGATIVE HIGH RISK HPV: Primary | ICD-10-CM

## 2024-07-09 NOTE — PROGRESS NOTES
[facility]  July 10, 2024      Behavioral Health Clinician Progress Note    Patient Name: Brittany Rooney           Service Type:  Individual      Service Location:   [facility] / Email (patient reached)     Session Start Time: 1001am  Session End Time: 1030am      Session Length: 16 - 37      Attendees: Client     Service Modality:  Video Visit:      Provider verified identity through the following two step process.  Patient provided:  Patient is known previously to provider    Telemedicine Visit: The patient's condition can be safely assessed and treated via synchronous audio and visual telemedicine encounter.      Reason for Telemedicine Visit: Services only offered telehealth    Originating Site (Patient Location): Patient's home    Distant Site (Provider Location): Provider Remote Setting- Home Office    Consent:  The patient/guardian has verbally consented to: the potential risks and benefits of telemedicine (video visit) versus in person care; bill my insurance or make self-payment for services provided; and responsibility for payment of non-covered services.     Patient would like the video invitation sent by:  My Chart    Mode of Communication:  Video Conference via [facility]    Distant Location (Provider):  Off-site    As the provider I attest to compliance with applicable laws and regulations related to telemedicine.    Visit Activities (Refresh list every visit): [facility] Only    Diagnostic Assessment Date: 10/24/23 Josi Cevallos [facility]  Treatment Plan Review Date: 7/10/24  See Flowsheets for today's PHQ-9 and GALE-7 results  Previous PHQ-9:       10/9/2023     9:55 AM 10/24/2023     3:18 PM 2/9/2024     7:28 AM   PHQ-9 SCORE   PHQ-9 Total Score Hilary 13 (Moderate depression) 14 (Moderate depression) 12 (Moderate depression)   PHQ-9 Total Score 13 14 12    12     Previous GALE-7:       10/24/2023     3:19 PM 2/9/2024     7:29 AM 7/10/2024     9:10  AM   GALE-7 SCORE   Total Score 18 (severe anxiety) 17 (severe anxiety) 18 (severe anxiety)   Total Score 18 17    17 18    18       DATA  Extended Session (60+ minutes): No  Interactive Complexity: No  Crisis: No  [facility] Patient: No    Treatment Objective(s) Addressed in This Session:  Target Behavior(s): disease management/lifestyle changes reducing ADHD sx; stabilizing mood    Depressed Mood: Increase interest, engagement, and pleasure in doing things  Decrease frequency and intensity of feeling down, depressed, hopeless  Improve concentration, focus, and mindfulness in daily activities   Anxiety: will experience a reduction in anxiety and will develop more effective coping skills to manage anxiety symptoms    Current Stressors / Issues:   update:  White rylieuckling life without ADHD meds.  Can't ever Vyvanse.  CAN get 10mg of Adderall.  Can't focus, concentrate, low motivation.  Has had July off, goes back to work in August.  Anxiety is tough at times.  Mood is more down, feeling more on edge, feeling very obsessive/compulsive.  Doom scrolling.   Stresses: interpersonal distress, work-high school sports  Appetite: N/a  Sleep: N/a  Outpatient Provider updates: Stopped [facility].  SI/SIB/HI: Denies current.  one previous attempt ETOH/cut wrists in 2021.  GOLDY:  on going ETOH absence.  THC occasionally.  Again newly, smoking nicotine.  Side effects/compliance: N/a  Interventions:  [facility] engaged in discussion about ADHD sx and interventions.  Discussed figits including chewies  Most important:  Can't ever get ADHD meds.  Lamictal very helpful but less so since last appt.  Recognizing some ASD sx.    2/9   update:  Feels like Lamictal has been helpful has been out for 5 days.  Less internal agitation.  Likely less depressive sx.  Also out of Vyvanse 2 days.  Taking short term Adderall today.  Anxiety has been high.  Picking at things/restless.  Denies panic  Stresses: interpersonal distress, work-high school sports  Appetite:  N/a  Sleep: Poor variable, recently increased  Outpatient Provider updates: [facility] recommended [facility]  SI/SIB/HI:  Intermittent hx of passive ideation, no plan/intent, one noted previous attempt ETOH/cut wrists in 2021.  GOLDY: Recent ETOH abstance after increased concerns.  THC occasionally  Side effects/compliance: as above  Interventions:  [facility] discussed ADHD interventions/time mgmt  Most important:  Get back on meds, titrate back on Lamictal?  Wonders about increasing Vyvanse? And or adding Adderall short term    Progress on Treatment Objective(s) / Homework:  New Objective established this session - CONTEMPLATION (Considering change and yet undecided); Intervened by assessing the negative and positive thinking (ambivalence) about behavior change    Motivational Interviewing    MI Intervention: Co-Developed Goal: reducing ADHD sx, increasing mood stabilization, Expressed Empathy/Understanding, and Open-ended questions     Change Talk Expressed by the Patient: Desire to change Reasons to change    Provider Response to Change Talk: E - Evoked more info from patient about behavior change and A - Affirmed patient's thoughts, decisions, or attempts at behavior change    Also provided psychoeducation about behavioral health condition, symptoms, and treatment options    Assessments completed prior to visit:  The following assessments were completed by patient for this visit:  [place] Suicide Severity Rating Scale (Lifetime/Recent)      10/9/2023    11:06 AM 2/9/2024     7:58 AM   [place] Suicide Severity Rating (Lifetime/Recent)   Q1 Wish to be Dead (Lifetime) Y Y   Wish to be Dead Description (Lifetime) Thoughts that things would be easier if she were not here    1. Wish to be Dead (Past 1 Month) N Y   Q2 Non-Specific Active Suicidal Thoughts (Lifetime) N Y   2. Non-Specific Active Suicidal Thoughts (Past 1 Month)  N   3. Active Suicidal Ideation with any Methods (Not Plan) Without Intent  to Act (Lifetime)  Y   Q3 Active Suicidal Ideation with any Methods (Not Plan) Without Intent to Act (Past 1 Month)  N   Q4 Active Suicidal Ideation with Some Intent to Act, Without Specific Plan (Lifetime)  Y   4. Active Suicidal Ideation with Some Intent to Act, Without Specific Plan (Past 1 Month)  N   Q5 Active Suicidal Ideation with Specific Plan and Intent (Lifetime)  Y   5. Active Suicidal Ideation with Specific Plan and Intent (Past 1 Month)  N   Most Severe Ideation Rating (Lifetime)  5   Most Severe Ideation Rating (Past 1 Month)  1   Frequency (Lifetime)  3   Frequency (Past 1 Month)  1   Duration (Lifetime)  3   Duration (Past 1 Month)  1   Controllability (Lifetime)  5   Controllability (Past 1 Month)  1   Deterrents (Lifetime) 1 5   Deterrents (Past 1 Month)  1   Reasons for Ideation (Lifetime) 4 5   Reasons for Ideation (Past 1 Month)  5   Actual Attempt (Lifetime) N Y   Total Number of Actual Attempts (Lifetime)  1   Actual Attempt Description (Lifetime)  ETOH and cut wrists   Actual Attempt (Past 3 Months)  N   Has subject engaged in non-suicidal self-injurious behavior? (Lifetime) N N   Interrupted Attempts (Lifetime) N N   Aborted or Self-Interrupted Attempt (Lifetime) N N   Preparatory Acts or Behavior (Lifetime) N N   Calculated C-SSRS Risk Score (Lifetime/Recent) No Risk Indicated Moderate Risk       Care Plan review completed: Yes    Medication Review:  Changes to psychiatric medications, see updated Medication List in EPIC.     Medication Compliance:  Yes    Changes in Health Issues:   None reported    Chemical Use Review:   Substance Use: Chemical use reviewed, no active concerns identified      Tobacco Use: No change in amount of tobacco use since last session.  Patient declined discussion at this time    Assessment: Current Emotional / Mental Status (status of significant symptoms):  Risk status (Self / Other harm or suicidal ideation)  Patient has had a history of suicidal ideation:  passive ideation noted previously and suicide attempts: previous noted attempted in 2021 ETOH/cut wrists  Patient denies current fears or concerns for personal safety.  Patient denies current or recent suicidal ideation or behaviors.  Patient denies current or recent homicidal ideation or behaviors.  Patient denies current or recent self injurious behavior or ideation.  Patient denies other safety concerns.  A safety and risk management plan has not been developed at this time, however patient was encouraged to call [place] [phone] should there be a change in any of these risk factors.    Appearance:   Appropriate   Eye Contact:   Good   Psychomotor Behavior: Normal   Attitude:   Cooperative   Orientation:   All  Speech   Rate / Production: Normal    Volume:  Normal   Mood:    Anxious  Depressed    Affect:    Appropriate   Thought Content:  Clear   Thought Form:  Coherent  Logical   Insight:    Good     Diagnoses:  1. Generalized anxiety disorder    2. Attention deficit hyperactivity disorder (ADHD), unspecified ADHD type    3. Mood disorder (H24)          Collateral Reports Completed:  Communicated with: Dr Calle    Plan: (Homework, other):  Patient was given information about behavioral services and encouraged to schedule a follow up appointment with the clinic [facility] as needed.  She was also given information about mental health symptoms and treatment options .  CD Recommendations: No indications of CD issues.       Milagro Melgar [facility]    ______________________________________________________________________    Integrated Primary Care Behavioral Health Treatment Plan    Patient's Name: Brittany Rooney  YOB: 1965    Date of Creation: 2/9/24  Date Treatment Plan Last Reviewed/Revised: 7/10/24    DSM5 Diagnoses:   1. Generalized anxiety disorder    2. Attention deficit hyperactivity disorder (ADHD), unspecified ADHD type    3. Mood disorder (H24)      Psychosocial / Contextual Factors:  interpersonal distress  PROMIS (reviewed every 90 days):     The following assessments were completed by patient for this visit:  PROMIS 10-Global Health (only subscores and total score):       8/11/2023     1:59 PM 10/9/2023     9:56 AM 10/24/2023     3:19 PM 2/9/2024     7:29 AM 7/10/2024     9:11 AM   PROMIS-10 Scores Only   Global Mental Health Score 6 5 7 9    9 7    7   Global Physical Health Score 15 15 16 16    16 16    16   PROMIS TOTAL - SUBSCORES 21 20 23 25    25 23    23       Referral / Collaboration:  Referral to another professional/service is not indicated at this time..    Anticipated number of session for this episode of care: 5-6  Anticipation frequency of session: Every other week  Anticipated Duration of each session: 16-37 minutes  Treatment plan will be reviewed in 90 days or when goals have been changed.       MeasurableTreatment Goal(s) related to diagnosis / functional impairment(s)  Goal 1: Patient will work on reducing anxiety via establishing boundaries    I will know I've met my goal when I have boundaries with family and friends.      Objective #A (Patient Action)    Patient will compile a list of boundaries that they would like to set with others. . .  Status: Continued - Date(s):7/10/24     Intervention(s)  Therapist will provide support through CBT, MI, Acceptance and Commitment Therapy, Dialectic Behavioral Therapy and problem solving model to explore and overcome barriers.    Goal 2: Patient will manage concerns of safety    I will know I've met my goal when I can reduce suicidal ideation .      Objective #A (Patient Action)    Patient will use previously developed safety plan on file.  Status: New - Date: 7/10/24     Intervention(s)  Therapist will provide support through CBT, MI, Acceptance and Commitment Therapy, Dialectic Behavioral Therapy and problem solving model to explore and overcome barriers.      Patient has reviewed and agreed to the above plan.      Milagro Melgar  [facility]  July 10, 2024

## 2024-07-10 ENCOUNTER — VIRTUAL VISIT (OUTPATIENT)
Dept: BEHAVIORAL HEALTH | Facility: CLINIC | Age: 59
End: 2024-07-10
Payer: COMMERCIAL

## 2024-07-10 ENCOUNTER — VIRTUAL VISIT (OUTPATIENT)
Dept: PSYCHIATRY | Facility: CLINIC | Age: 59
End: 2024-07-10
Payer: COMMERCIAL

## 2024-07-10 DIAGNOSIS — F39 MOOD DISORDER (H): ICD-10-CM

## 2024-07-10 DIAGNOSIS — F90.9 ATTENTION DEFICIT HYPERACTIVITY DISORDER (ADHD), UNSPECIFIED ADHD TYPE: ICD-10-CM

## 2024-07-10 DIAGNOSIS — F41.9 ANXIETY: ICD-10-CM

## 2024-07-10 DIAGNOSIS — F90.9 ATTENTION DEFICIT HYPERACTIVITY DISORDER (ADHD), UNSPECIFIED ADHD TYPE: Primary | ICD-10-CM

## 2024-07-10 DIAGNOSIS — F41.1 GENERALIZED ANXIETY DISORDER: Primary | ICD-10-CM

## 2024-07-10 PROCEDURE — G2211 COMPLEX E/M VISIT ADD ON: HCPCS | Mod: 95 | Performed by: PSYCHIATRY & NEUROLOGY

## 2024-07-10 PROCEDURE — 99214 OFFICE O/P EST MOD 30 MIN: CPT | Mod: 95 | Performed by: PSYCHIATRY & NEUROLOGY

## 2024-07-10 PROCEDURE — 90832 PSYTX W PT 30 MINUTES: CPT | Mod: 95 | Performed by: COUNSELOR

## 2024-07-10 RX ORDER — FLUOXETINE 40 MG/1
40 CAPSULE ORAL DAILY
Qty: 90 CAPSULE | Refills: 1 | Status: SHIPPED | OUTPATIENT
Start: 2024-07-10

## 2024-07-10 RX ORDER — DEXTROAMPHETAMINE SACCHARATE, AMPHETAMINE ASPARTATE, DEXTROAMPHETAMINE SULFATE AND AMPHETAMINE SULFATE 2.5; 2.5; 2.5; 2.5 MG/1; MG/1; MG/1; MG/1
10 TABLET ORAL DAILY
Qty: 30 TABLET | Refills: 0 | Status: SHIPPED | OUTPATIENT
Start: 2024-07-10 | End: 2024-08-09

## 2024-07-10 RX ORDER — DEXTROAMPHETAMINE SACCHARATE, AMPHETAMINE ASPARTATE MONOHYDRATE, DEXTROAMPHETAMINE SULFATE AND AMPHETAMINE SULFATE 7.5; 7.5; 7.5; 7.5 MG/1; MG/1; MG/1; MG/1
30 CAPSULE, EXTENDED RELEASE ORAL DAILY
Qty: 30 CAPSULE | Refills: 0 | Status: SHIPPED | OUTPATIENT
Start: 2024-09-08 | End: 2024-10-08

## 2024-07-10 RX ORDER — DEXTROAMPHETAMINE SACCHARATE, AMPHETAMINE ASPARTATE, DEXTROAMPHETAMINE SULFATE AND AMPHETAMINE SULFATE 2.5; 2.5; 2.5; 2.5 MG/1; MG/1; MG/1; MG/1
10 TABLET ORAL DAILY
Qty: 30 TABLET | Refills: 0 | Status: SHIPPED | OUTPATIENT
Start: 2024-09-08 | End: 2024-10-08

## 2024-07-10 RX ORDER — DEXTROAMPHETAMINE SACCHARATE, AMPHETAMINE ASPARTATE MONOHYDRATE, DEXTROAMPHETAMINE SULFATE AND AMPHETAMINE SULFATE 7.5; 7.5; 7.5; 7.5 MG/1; MG/1; MG/1; MG/1
30 CAPSULE, EXTENDED RELEASE ORAL DAILY
Qty: 30 CAPSULE | Refills: 0 | Status: SHIPPED | OUTPATIENT
Start: 2024-08-09 | End: 2024-09-08

## 2024-07-10 RX ORDER — DEXTROAMPHETAMINE SACCHARATE, AMPHETAMINE ASPARTATE, DEXTROAMPHETAMINE SULFATE AND AMPHETAMINE SULFATE 2.5; 2.5; 2.5; 2.5 MG/1; MG/1; MG/1; MG/1
10 TABLET ORAL DAILY
Qty: 30 TABLET | Refills: 0 | Status: SHIPPED | OUTPATIENT
Start: 2024-08-09 | End: 2024-09-08

## 2024-07-10 RX ORDER — DEXTROAMPHETAMINE SACCHARATE, AMPHETAMINE ASPARTATE MONOHYDRATE, DEXTROAMPHETAMINE SULFATE AND AMPHETAMINE SULFATE 7.5; 7.5; 7.5; 7.5 MG/1; MG/1; MG/1; MG/1
30 CAPSULE, EXTENDED RELEASE ORAL DAILY
Qty: 30 CAPSULE | Refills: 0 | Status: SHIPPED | OUTPATIENT
Start: 2024-07-10 | End: 2024-08-09

## 2024-07-10 RX ORDER — LAMOTRIGINE 150 MG/1
300 TABLET ORAL DAILY
Qty: 180 TABLET | Refills: 0 | Status: SHIPPED | OUTPATIENT
Start: 2024-07-10

## 2024-07-10 ASSESSMENT — ANXIETY QUESTIONNAIRES
2. NOT BEING ABLE TO STOP OR CONTROL WORRYING: MORE THAN HALF THE DAYS
2. NOT BEING ABLE TO STOP OR CONTROL WORRYING: MORE THAN HALF THE DAYS
3. WORRYING TOO MUCH ABOUT DIFFERENT THINGS: MORE THAN HALF THE DAYS
5. BEING SO RESTLESS THAT IT IS HARD TO SIT STILL: NEARLY EVERY DAY
GAD7 TOTAL SCORE: 18
7. FEELING AFRAID AS IF SOMETHING AWFUL MIGHT HAPPEN: MORE THAN HALF THE DAYS
1. FEELING NERVOUS, ANXIOUS, OR ON EDGE: NEARLY EVERY DAY
4. TROUBLE RELAXING: NEARLY EVERY DAY
7. FEELING AFRAID AS IF SOMETHING AWFUL MIGHT HAPPEN: MORE THAN HALF THE DAYS
4. TROUBLE RELAXING: NEARLY EVERY DAY
5. BEING SO RESTLESS THAT IT IS HARD TO SIT STILL: NEARLY EVERY DAY
7. FEELING AFRAID AS IF SOMETHING AWFUL MIGHT HAPPEN: MORE THAN HALF THE DAYS
GAD7 TOTAL SCORE: 18
GAD7 TOTAL SCORE: 18
8. IF YOU CHECKED OFF ANY PROBLEMS, HOW DIFFICULT HAVE THESE MADE IT FOR YOU TO DO YOUR WORK, TAKE CARE OF THINGS AT HOME, OR GET ALONG WITH OTHER PEOPLE?: VERY DIFFICULT
GAD7 TOTAL SCORE: 18
1. FEELING NERVOUS, ANXIOUS, OR ON EDGE: NEARLY EVERY DAY
8. IF YOU CHECKED OFF ANY PROBLEMS, HOW DIFFICULT HAVE THESE MADE IT FOR YOU TO DO YOUR WORK, TAKE CARE OF THINGS AT HOME, OR GET ALONG WITH OTHER PEOPLE?: VERY DIFFICULT
GAD7 TOTAL SCORE: 18
IF YOU CHECKED OFF ANY PROBLEMS ON THIS QUESTIONNAIRE, HOW DIFFICULT HAVE THESE PROBLEMS MADE IT FOR YOU TO DO YOUR WORK, TAKE CARE OF THINGS AT HOME, OR GET ALONG WITH OTHER PEOPLE: VERY DIFFICULT
3. WORRYING TOO MUCH ABOUT DIFFERENT THINGS: MORE THAN HALF THE DAYS
IF YOU CHECKED OFF ANY PROBLEMS ON THIS QUESTIONNAIRE, HOW DIFFICULT HAVE THESE PROBLEMS MADE IT FOR YOU TO DO YOUR WORK, TAKE CARE OF THINGS AT HOME, OR GET ALONG WITH OTHER PEOPLE: VERY DIFFICULT
GAD7 TOTAL SCORE: 18
6. BECOMING EASILY ANNOYED OR IRRITABLE: NEARLY EVERY DAY
7. FEELING AFRAID AS IF SOMETHING AWFUL MIGHT HAPPEN: MORE THAN HALF THE DAYS
6. BECOMING EASILY ANNOYED OR IRRITABLE: NEARLY EVERY DAY

## 2024-07-10 NOTE — NURSING NOTE
Current patient location: [address] [place] [address] [address]  [place] [address]    Is the patient currently in the state of [place]? YES    Visit mode:VIDEO    If the visit is dropped, the patient can be reconnected by: VIDEO VISIT: Text to cell phone:   Telephone Information:   Mobile [phone]       Will anyone else be joining the visit? NO  (If patient encounters technical issues they should call [phone])    How would you like to obtain your AVS? MyChart    Are changes needed to the allergy or medication list? Yes Pt stated they are not allergic to PCN and pt is not taking Vyvanse due to shortage    Are refills needed on medications prescribed by this physician? YES    Reason for visit: RECHECK    Cassia HUANG

## 2024-07-10 NOTE — PROGRESS NOTES
"Telemedicine Visit: The patient's condition can be safely assessed and treated via synchronous audio and visual telemedicine encounter.      Reason for Telemedicine Visit: Patient has requested telehealth visit    Originating Site (Patient Location): Patient's home    Distant Location (provider location):  Off-site    Consent:  The patient/guardian has verbally consented to: the potential risks and benefits of telemedicine (video visit) versus in person care; bill my insurance or make self-payment for services provided; and responsibility for payment of non-covered services.     Mode of Communication:  Video Conference via [employer]    As the provider I attest to compliance with applicable laws and regulations related to telemedicine.           Outpatient Psychiatric Progress Note    Name: Brittany CHRISSY Rooney   : 1965                    Primary Care Provider: Esther Armstrong MD   Therapist: None    PHQ-9 scores:      10/9/2023     9:55 AM 10/24/2023     3:18 PM 2024     7:28 AM   PHQ-9 SCORE   PHQ-9 Total Score MyChart 13 (Moderate depression) 14 (Moderate depression) 12 (Moderate depression)   PHQ-9 Total Score 13 14 12    12       GALE-7 scores:      10/24/2023     3:19 PM 2024     7:29 AM 7/10/2024     9:10 AM   GALE-7 SCORE   Total Score 18 (severe anxiety) 17 (severe anxiety) 18 (severe anxiety)   Total Score 18 17    17 18    18       Patient Identification:  Patient is a 58 year old,   White Not  or  female  who presents for return visit with me.  Patient is currently employed full time. Patient attended the phone/video session alone. Patient prefers to be called: \"Veronica\".    Interim History:  I last saw Brittany Rooney for outpatient psychiatry return visit on 2024. During that appointment, we:    Continue Adderall IR 10 mg IR daily as needed for ADHD  Continue fluoxetine 40 mg daily for mood and anxiety  Increase lamotrigine to 150 mg daily (after 2 weeks on " "100 mg daily) and then increase to 200 mg daily for mood.  Continue to monitor for any rashes.  Continue quetiapine/Seroquel 25 mg at bedtime as needed for sleep  Increase Vyvanse to 50 mg daily as needed for ADHD.     7/10: Patient struggling to obtain ADHD medication.  Pharmacy is near her have been out of Vyvanse consistently.  Wondering about switching back to Adderall XR.  Struggling significantly with ADHD symptoms.  Historically has found lamotrigine to be incredibly helpful but wonders if some of the effects have worn off.  No acute safety concerns.  No SI.  No problematic drug or alcohol use.  See [facility] note below for additional details.    Per [facility], [facility]agro Melgar [facility], during today's team-based visit:   update:  White knuckling life without ADHD meds.  Can't ever Vyvanse.  CAN get 10mg of Adderall.  Can't focus, concentrate, low motivation.  Has had July off, goes back to work in August.  Anxiety is tough at times.  Mood is more down, feeling more on edge, feeling very obsessive/compulsive.  Doom scrolling.   Stresses: interpersonal distress, work-high school sports  Appetite: N/a  Sleep: N/a  Outpatient Provider updates: Stopped FCC.  SI/SIB/HI: Denies current.  one previous attempt ETOH/cut wrists in 2021.  GOLDY:  on going ETOH absence.  THC occasionally.  Again newly, smoking nicotine.  Side effects/compliance: N/a  Interventions:  [facility] engaged in discussion about ADHD sx and interventions.  Discussed figits including chewies  Most important:  Can't ever get ADHD meds.  Lamictal very helpful but less so since last appt.  Recognizing some ASD sx.    Past Psychiatric Med Trials:  Psych Meds at Intake:  Adderall XR 20 mg daily   fluoxetine 40 mg daily - \"I don't know if it does anything for me\" increased to 40 mg a year ago, no side effects  Quetiapine 25 mg at bedtime  - on 2 weeks, not sure if feel different      Past Psych Meds:  Sertraline - bad on stomach  Wellbutrin     Psychiatric ROS:  Brittany LOWE " Nevin reports mood has been: See HPI above  Anxiety has been: See HPI above  Sleep has been: See HPI above  Susan sxs: None  Psychosis sxs: None  ADHD/ADD sxs: See HPI above  PTSD sxs: NA  PHQ9 and GAD7 scores were reviewed today if completed.   Medication side effects: Denies  Current stressors include: Symptoms and see HPI above  Coping mechanisms and supports include: Family, Hobbies, and Friends    Current medications include:   Current Outpatient Medications   Medication Sig Dispense Refill    amphetamine-dextroamphetamine (ADDERALL) 10 MG tablet Take 1 tablet (10 mg) by mouth daily as needed (ADHD) 30 tablet 0    lamoTRIgine (LAMICTAL) 200 MG tablet Take 1 tablet (200 mg) by mouth daily 30 tablet 0    benzonatate (TESSALON) 100 MG capsule Take 1 capsule (100 mg) by mouth 3 times daily as needed for cough 30 capsule 0    Cholecalciferol (VITAMIN D) 2000 UNIT tablet Take 1 tablet by mouth 2 times daily. 100 tablet 6    clobetasol (TEMOVATE) 0.05 % external ointment Apply topically 2 times daily 30 g 0    clobetasol (TEMOVATE) 0.05 % external ointment APPLY TOPICALLY TO THE AFFECTED AREA TWICE DAILY (Patient not taking: Reported on 7/10/2023) 30 g 0    FLUoxetine (PROZAC) 40 MG capsule Take 1 capsule (40 mg) by mouth daily 90 capsule 1    IBUPROFEN 200 MG OR CAPS 2 CAPSULE EVERY 4 TO 6 HOURS AS NEEDED      levothyroxine (SYNTHROID/LEVOTHROID) 125 MCG tablet Take 1 tablet (125 mcg) by mouth daily +++ appointment needed for additional refills +++ 30 tablet 0    lisdexamfetamine (VYVANSE) 50 MG capsule Take 1 capsule (50 mg) by mouth daily Appt needed for refills. 30 capsule 0    mometasone (ELOCON) 0.1 % external cream Apply  topically as needed. 30 g 0     No current facility-administered medications for this visit.       The [place] Prescription Monitoring Program has been reviewed and there are no concerns about diversionary activity for controlled substances at this time.   06/14/2024 02/09/2024 1  Dextroamp-Amphetamin 10 Mg Tab 30.00 30 Al Bau [id number] [facility] (1802) 0/0  [place]   05/18/2024 02/09/2024 1 Dextroamp-Amphetamin 10 Mg Tab 30.00 30 Al Bau [id number] [facility] (1802[place]   04/15/2024 02/09/2024 1 Lisdexamfetamine 50 Mg Capsule 30.00 30 Al Bau [id number] [facility] (1802) 0/[place]   03/20/2024 02/09/2024 1 Dextroamp-Amphetamin 10 Mg Tab 30.00 30 Al Bau [id number] [facility] (1802) 0/[place]   01/24/2024 01/07/2024 1 Dextroamp-Amphetamin 10 Mg Tab 30.00 30 Pa Kol [id number] [facility] (1802) 0/[place]  [place]   01/07/2024 01/07/2024 1 Lisdexamfetamine 40 Mg Capsule 30.00 30 Pa Kol [id number] [facility] (1802) 0/0  [place]       Past Medical/Surgical History:  Past Medical History:   Diagnosis Date    Abnormal Pap smear of cervix 01/22/2019    see problem list    Attention deficit disorder with hyperactivity(314.01)     Major depressive disorder, recurrent episode, unspecified     Morbid obesity (H)     Nontoxic uninodular goiter     Other malaise and fatigue     Other malaise and fatigue     Plantar fascial fibromatosis     Thyroid nodule       has a past medical history of Abnormal Pap smear of cervix (01/22/2019), Attention deficit disorder with hyperactivity(314.01), Major depressive disorder, recurrent episode, unspecified, Morbid obesity (H), Nontoxic uninodular goiter, Other malaise and fatigue, Other malaise and fatigue, Plantar fascial fibromatosis, and Thyroid nodule.    She has no past medical history of Arthritis, Cancer (H), Cerebral infarction (H), Congestive heart failure (H), COPD (chronic obstructive pulmonary disease) (H), Depressive disorder, Diabetes (H), Heart disease, History of blood transfusion, Hypertension, or Uncomplicated asthma.    Social History:  Reviewed. No changes to social history except as noted above in HPI.    Vital Signs:   None. This is phone/video visit.     Labs:  Most recent laboratory results reviewed and no new labs.     Review of Systems:  10 systems (general,  cardiovascular, respiratory, eyes, ENT, endocrine, GI, , M/S, neurological) were reviewed. Most pertinent finding(s) is/are:  denies fever, cough, persistent headaches, shortness of breath, chest pain, severe GI symptoms, trouble urinating, severe pain. The remaining systems are all unremarkable.    Mental Status Examination (limited as this is by phone/video):  Appearance: Awake, alert, appears stated age, no acute distress, well-groomed   Attitude:  cooperative, pleasant   Motor: No gross abnormalities observed via video, not formally tested   Oriented to:  person, place, time, and situation  Attention Span and Concentration:  normal  Speech:  clear, coherent, regular rate, rhythm, and volume  Language: intact  Mood: More down  Affect:  appropriate and in normal range and mood congruent  Associations:  no loose associations  Thought Process:  logical, linear and goal oriented  Thought Content:  no evidence of suicidal ideation or homicidal ideation, no evidence of psychotic thought, no auditory hallucinations present and no visual hallucinations present  Recent and Remote Memory:  Intact to interview. Not formally assessed. No amnesia.  Fund of Knowledge: appropriate  Insight:  good  Judgment:  intact, adequate for safety  Impulse Control:  intact    Suicide Risk Assessment:  Today Brittany Rooney reports no suicidal ideation. Based on all available evidence including the factors cited above, Brittany Rooney does not appear to be at imminent risk for self-harm, does not meet criteria for a 72-hr hold, and therefore remains appropriate for ongoing outpatient level of care.  A thorough assessment of risk factors related to suicide and self-harm have been reviewed and are noted above. The patient convincingly denies suicidality on several occasions. Local community safety resources reviewed for patient to use if needed. There was no deceit detected, and the patient presented in a manner that was believable.      DSM5 Diagnosis:  Attention-Deficit/Hyperactivity Disorder  314.01 (F90.9) Unspecified Attention -Deficit / Hyperactivity Disorder  Mood Disorder, Unspecified  Anxiety, Unspecified  R/O Borderline Personality Disorder    Medical comorbidities include:   Patient Active Problem List    Diagnosis Date Noted    ASCUS of cervix with negative high risk HPV 01/22/2019     Priority: Medium     2013, 2016 NIL paps  1/22/19 ASCUS pap, neg HPV. Plan: cotest in 3 years.  7/10/23 LSIL, +HR HPV (not 16/18). Plan: cotest in 1 year due 07/10/24  07/17/23 Left msg and Mychart result note sent. Pt notified via mychart  6/25/24 Reminder mychart            Eczema of both hands 12/22/2016     Priority: Medium    Seborrheic keratosis 11/17/2015     Priority: Medium    Vitamin D deficiency disease 02/25/2013     Priority: Medium    Contact dermatitis 02/19/2013     Priority: Medium    Obesity 02/19/2013     Priority: Medium    Adjustment disorder with anxiety 06/11/2011     Priority: Medium    ADD (attention deficit disorder) 06/05/2009     Priority: Medium    Hyperlipidemia LDL goal <130 12/03/2008     Priority: Medium    Hypothyroidism 11/25/2008     Priority: Medium    Malaise and fatigue      Priority: Medium     Problem list name updated by automated process. Provider to review         Psychosocial & Contextual Factors: see HPI above    Assessment:  From Intake, 8/11/2023:  Brittany Rooney is a 56 yo with past psychiatric hx including depression, anxiety, and ADHD who presents today for psychiatric evaluation. Pt with long hx of mental health struggles. Struggling significantly with mood regulation lately and has questioned possible mood disorder vs borderline personality disorder (BPD). Pt with many features of BPD after discussion of criteria and review of her hx and current struggles. Pt will read more about the dx. Pt agreeable to start a trial with lamotrigine to see if helpful for mood stabilization. Also recommend DBT  and resources provided today. If lamotrigine really helpful, could consider tapering off fluoxetine. Passive thoughts of death/suicide but no plan or intent to harm self. Denies current problematic drug or alcohol use. Uses cannabis but stopped using alcohol about 4-6 weeks ago.      9/22/2023:  Discussed patient may benefit from Vyvanse trial over Adderall XR.  We will start trial with Vyvanse 40 mg daily in place of Adderall XR dose to see if more effective and better tolerated.  Could consider continued titration of lamotrigine at next visit.  Strongly recommend therapy/consideration for DBT.  Resources previously sent for DBT.  No acute safety concerns.  No SI.  No problematic drug or alcohol use.    2/9/2024:  Patient overall appreciating effects of lamotrigine.  Patient would like to continue to optimize lamotrigine since it does help stabilize mood and irritability some.  No negative side effects.  Patient will continue at 100 mg daily for the next 2 weeks and then increase to 150 mg for 2 weeks then 200 mg daily.  Patient also feeling like Vyvanse 40 mg has had some waning effects.  No chest pains, racing heart, or tics.  We will increase to 50 mg daily to see if more helpful and if effects last a little bit longer than lunchtime.  Discussed needing blood pressure readings, especially with increased dose of Vyvanse.  No acute safety concerns.  No SI.  No problematic drug or alcohol use.    7/10/2023:  Patient overall struggling, particularly with ADHD symptoms.  We discussed alternative pharmacies such as [place] [facility], [facility], [place] and mail order pharmacy.  Patient prefers to switch back to Adderall to see if her local pharmacy can obtain the medication.  Due to some decreased effects of lamotrigine, we will increase up to 300 mg daily.  Patient encouraged to still watch for any rashes.  No acute safety concerns.  No SI.  No problematic drug or alcohol use.    Medication side effects and  alternatives were reviewed. Health promotion activities recommended and reviewed today. All questions addressed. Education and counseling completed regarding risks and benefits of medications and psychotherapy options. Recommend therapy for additional support.     Treatment Plan:  Continue Adderall IR 10 mg IR daily as needed for ADHD  Restart Adderall XR at 30 mg daily as needed for ADHD.  Continue fluoxetine 40 mg daily for mood and anxiety  Increase lamotrigine to 150 mg twice daily, or 300 mg once daily for mood.  Continue to monitor for any rashes.  Continue quetiapine/Seroquel 25 mg at bedtime as needed for sleep  Discontinue Vyvanse   Continue all other cares per primary care provider.   Continue all other medications as reviewed per electronic medical record today.   Safety plan reviewed. To the Emergency Department as needed or call after hours crisis line at [phone] or [phone]. [place] Crisis Text Line. Text [place] to 775164 or Suicide LifeLine Chat: suicidepreventionlifeline.org/chat  Consider psychotherapy as nonpharmacotherapy option. Strongly consider DBT.   Schedule an appointment with me in 2 months or sooner as needed. Call [facility] at [phone] to schedule.  Follow up with primary care provider as planned or for acute medical concerns.  Call the psychiatric nurse line with medication questions or concerns at [phone].  MyChart may be used to communicate with your provider, but this is not intended to be used for emergencies.    Have previously discussed risks of stimulant medication including, but not limited to, decreased appetite, risk of tics (and that they may be lasting), trouble sleeping, cardiac risks such as increased heart rate and blood pressure, and rare risk of sudden cardiac death.  Also risk of addiction/tolerance/dependence.  Also have discussed newer evidence that came out November 2023 showing up to 23% increased risk for cardiovascular  diseases-particularly high blood pressure and arterial sclerosis/hardening of the arteries.    Administrative Billing:   Phone Call/Video Duration: 10 Minutes  Start: 10:34a  Stop: 10:44a    The longitudinal plan of care for the diagnosis(es)/condition(s) as documented were addressed during this visit. Due to the added complexity in care, I will continue to support Veronica in the subsequent management and with ongoing continuity of care.    Episode of Care #: 4    Patient Status:  Patient will continue to be seen for ongoing consultation and stabilization.    Signed:   Jossy Calle DO  [facility]    Disclaimer: This note consists of symbols derived from keyboarding, dictation and/or voice recognition software. As a result, there may be errors in the script that have gone undetected. Please consider this when interpreting information found in this chart.

## 2024-07-10 NOTE — PATIENT INSTRUCTIONS
Treatment Plan:  Continue Adderall IR 10 mg IR daily as needed for ADHD  Restart Adderall XR at 30 mg daily as needed for ADHD.  Continue fluoxetine 40 mg daily for mood and anxiety  Increase lamotrigine to 150 mg twice daily, or 300 mg once daily for mood.  Continue to monitor for any rashes.  Continue quetiapine/Seroquel 25 mg at bedtime as needed for sleep  Discontinue Vyvanse   Continue all other cares per primary care provider.   Continue all other medications as reviewed per electronic medical record today.   Safety plan reviewed. To the Emergency Department as needed or call after hours crisis line at [phone] or [phone]. [place] Crisis Text Line. Text [place] to 732473 or Suicide LifeLine Chat: suicidepreventionlifeline.org/chat  Consider psychotherapy as nonpharmacotherapy option. Strongly consider DBT.   Schedule an appointment with me in 2 months or sooner as needed. Call [facility] at [phone] to schedule.  Follow up with primary care provider as planned or for acute medical concerns.  Call the psychiatric nurse line with medication questions or concerns at [phone].  [email or web address] may be used to communicate with your provider, but this is not intended to be used for emergencies.    Have previously discussed risks of stimulant medication including, but not limited to, decreased appetite, risk of tics (and that they may be lasting), trouble sleeping, cardiac risks such as increased heart rate and blood pressure, and rare risk of sudden cardiac death.  Also risk of addiction/tolerance/dependence.  Also have discussed newer evidence that came out November 2023 showing up to 23% increased risk for cardiovascular diseases-particularly high blood pressure and arterial sclerosis/hardening of the arteries.

## 2024-07-14 DIAGNOSIS — E03.4 HYPOTHYROIDISM DUE TO ACQUIRED ATROPHY OF THYROID: ICD-10-CM

## 2024-07-16 NOTE — TELEPHONE ENCOUNTER
Please call patient and assist with scheduling follow up appointment - lab appointment needed now for thyroid refill.  Assist with scheduling physical when available.  MAX

## 2024-07-19 RX ORDER — LEVOTHYROXINE SODIUM 125 UG/1
125 TABLET ORAL DAILY
Qty: 14 TABLET | Refills: 0 | Status: SHIPPED | OUTPATIENT
Start: 2024-07-19 | End: 2024-08-08

## 2024-07-30 DIAGNOSIS — Z13.0 SCREENING, ANEMIA, DEFICIENCY, IRON: ICD-10-CM

## 2024-07-30 DIAGNOSIS — E03.4 HYPOTHYROIDISM DUE TO ACQUIRED ATROPHY OF THYROID: ICD-10-CM

## 2024-07-30 DIAGNOSIS — Z13.1 SCREENING FOR DIABETES MELLITUS: ICD-10-CM

## 2024-07-30 DIAGNOSIS — E78.5 HYPERLIPIDEMIA LDL GOAL <130: Primary | ICD-10-CM

## 2024-08-02 RX ORDER — LEVOTHYROXINE SODIUM 125 UG/1
125 TABLET ORAL DAILY
Qty: 14 TABLET | Refills: 0 | OUTPATIENT
Start: 2024-08-02

## 2024-08-08 ENCOUNTER — MYC REFILL (OUTPATIENT)
Dept: FAMILY MEDICINE | Facility: CLINIC | Age: 59
End: 2024-08-08
Payer: COMMERCIAL

## 2024-08-08 DIAGNOSIS — E03.4 HYPOTHYROIDISM DUE TO ACQUIRED ATROPHY OF THYROID: ICD-10-CM

## 2024-08-08 DIAGNOSIS — L30.9 ECZEMA OF BOTH HANDS: ICD-10-CM

## 2024-08-08 RX ORDER — LEVOTHYROXINE SODIUM 125 UG/1
125 TABLET ORAL DAILY
Qty: 14 TABLET | Refills: 0 | Status: SHIPPED | OUTPATIENT
Start: 2024-08-08 | End: 2024-08-28

## 2024-08-08 RX ORDER — CLOBETASOL PROPIONATE 0.5 MG/G
OINTMENT TOPICAL 2 TIMES DAILY
Qty: 15 G | Refills: 0 | Status: SHIPPED | OUTPATIENT
Start: 2024-08-08

## 2024-08-12 ENCOUNTER — MYC REFILL (OUTPATIENT)
Dept: PSYCHIATRY | Facility: CLINIC | Age: 59
End: 2024-08-12
Payer: COMMERCIAL

## 2024-08-12 DIAGNOSIS — F90.9 ATTENTION DEFICIT HYPERACTIVITY DISORDER (ADHD), UNSPECIFIED ADHD TYPE: ICD-10-CM

## 2024-08-13 RX ORDER — DEXTROAMPHETAMINE SACCHARATE, AMPHETAMINE ASPARTATE MONOHYDRATE, DEXTROAMPHETAMINE SULFATE AND AMPHETAMINE SULFATE 7.5; 7.5; 7.5; 7.5 MG/1; MG/1; MG/1; MG/1
30 CAPSULE, EXTENDED RELEASE ORAL DAILY
Qty: 30 CAPSULE | Refills: 0 | OUTPATIENT
Start: 2024-08-13

## 2024-08-13 NOTE — TELEPHONE ENCOUNTER
Reviewed [facility] refill request for amphetamine-dextroamphetamine (ADDERALL XR) 30 MG 24 hr capsule       Medication(s) requested:   -  amphetamine-dextroamphetamine (ADDERALL XR) 30 MG 24 hr capsule   Date last ordered: 7/10/2024  Qty: 30  Refills: 2 with start dates of 8/9/2024 and 9/8/2024    Take 1 capsule (30 mg) by mouth daily for 30 days   Sent to [id number] - [place], [place] - [address] AT [facility] [address]  Appropriate for refill? No: Pharmacy should have refill(s) on file       Any Controlled Substance(s)? Yes   [place]  checked? Yes   Dextroamp-Amphet ER 30 mg Cap was last sold on 7/11/2024 for quantity of 30.  Other [place] on [place] ?: Yes   If yes, are any new medications? No      Additional action taken? refill request(s) sent back to pharmacy as RANDY RN notified the patient via [email or web address].       Mary Ramey RN on 8/13/2024 at 7:17 AM

## 2024-08-21 NOTE — TELEPHONE ENCOUNTER
FYI to provider - Patient is lost to pap tracking follow-up. Attempts to contact pt have been made per reminder process and there has been no reply and/or no appt scheduled. Contact hx listed below.     2013, 2016 NIL paps  1/22/19 ASCUS pap, neg HPV. Plan: cotest in 3 years.  7/10/23 LSIL, +HR HPV (not 16/18). Plan: cotest in 1 year due 07/10/24  07/17/23 Left msg and [email or web address] result note sent. Pt notified via [employer]  6/25/24 Reminder [employer]  7/23/24 Reminder call -- left message  8/21/24 Lost to follow-up for pap tracking     Rylee Kwon RN BSN, Pap Tracking

## 2024-08-28 DIAGNOSIS — E03.4 HYPOTHYROIDISM DUE TO ACQUIRED ATROPHY OF THYROID: ICD-10-CM

## 2024-08-28 RX ORDER — LEVOTHYROXINE SODIUM 125 UG/1
125 TABLET ORAL DAILY
Qty: 14 TABLET | Refills: 0 | Status: SHIPPED | OUTPATIENT
Start: 2024-08-28 | End: 2024-09-08

## 2024-09-06 ENCOUNTER — LAB (OUTPATIENT)
Dept: LAB | Facility: CLINIC | Age: 59
End: 2024-09-06
Payer: COMMERCIAL

## 2024-09-06 DIAGNOSIS — Z13.1 SCREENING FOR DIABETES MELLITUS: ICD-10-CM

## 2024-09-06 DIAGNOSIS — Z13.0 SCREENING, ANEMIA, DEFICIENCY, IRON: ICD-10-CM

## 2024-09-06 DIAGNOSIS — E03.4 HYPOTHYROIDISM DUE TO ACQUIRED ATROPHY OF THYROID: ICD-10-CM

## 2024-09-06 DIAGNOSIS — E78.5 HYPERLIPIDEMIA LDL GOAL <130: ICD-10-CM

## 2024-09-06 LAB
ALBUMIN SERPL BCG-MCNC: 4.4 G/DL (ref 3.5–5.2)
ALP SERPL-CCNC: 79 U/L (ref 40–150)
ALT SERPL W P-5'-P-CCNC: 13 U/L (ref 0–50)
ANION GAP SERPL CALCULATED.3IONS-SCNC: 11 MMOL/L (ref 7–15)
AST SERPL W P-5'-P-CCNC: 25 U/L (ref 0–45)
BILIRUB SERPL-MCNC: 0.5 MG/DL
BUN SERPL-MCNC: 18.4 MG/DL (ref 6–20)
CALCIUM SERPL-MCNC: 9.3 MG/DL (ref 8.8–10.4)
CHLORIDE SERPL-SCNC: 104 MMOL/L (ref 98–107)
CHOLEST SERPL-MCNC: 215 MG/DL
CREAT SERPL-MCNC: 0.69 MG/DL (ref 0.51–0.95)
EGFRCR SERPLBLD CKD-EPI 2021: >90 ML/MIN/1.73M2
ERYTHROCYTE [DISTWIDTH] IN BLOOD BY AUTOMATED COUNT: 12.2 % (ref 10–15)
FASTING STATUS PATIENT QL REPORTED: NO
FASTING STATUS PATIENT QL REPORTED: NO
GLUCOSE SERPL-MCNC: 104 MG/DL (ref 70–99)
HCO3 SERPL-SCNC: 25 MMOL/L (ref 22–29)
HCT VFR BLD AUTO: 44.7 % (ref 35–47)
HDLC SERPL-MCNC: 61 MG/DL
HGB BLD-MCNC: 14.9 G/DL (ref 11.7–15.7)
LDLC SERPL CALC-MCNC: 129 MG/DL
MCH RBC QN AUTO: 31.3 PG (ref 26.5–33)
MCHC RBC AUTO-ENTMCNC: 33.3 G/DL (ref 31.5–36.5)
MCV RBC AUTO: 94 FL (ref 78–100)
NONHDLC SERPL-MCNC: 154 MG/DL
PLATELET # BLD AUTO: 252 10E3/UL (ref 150–450)
POTASSIUM SERPL-SCNC: 4.2 MMOL/L (ref 3.4–5.3)
PROT SERPL-MCNC: 6.8 G/DL (ref 6.4–8.3)
RBC # BLD AUTO: 4.76 10E6/UL (ref 3.8–5.2)
SODIUM SERPL-SCNC: 140 MMOL/L (ref 135–145)
TRIGL SERPL-MCNC: 126 MG/DL
TSH SERPL DL<=0.005 MIU/L-ACNC: 1.06 UIU/ML (ref 0.3–4.2)
WBC # BLD AUTO: 6.3 10E3/UL (ref 4–11)

## 2024-09-06 PROCEDURE — 80061 LIPID PANEL: CPT

## 2024-09-06 PROCEDURE — 36415 COLL VENOUS BLD VENIPUNCTURE: CPT

## 2024-09-06 PROCEDURE — 85027 COMPLETE CBC AUTOMATED: CPT

## 2024-09-06 PROCEDURE — 84443 ASSAY THYROID STIM HORMONE: CPT

## 2024-09-06 PROCEDURE — 80053 COMPREHEN METABOLIC PANEL: CPT

## 2024-09-08 DIAGNOSIS — E03.4 HYPOTHYROIDISM DUE TO ACQUIRED ATROPHY OF THYROID: ICD-10-CM

## 2024-09-08 RX ORDER — LEVOTHYROXINE SODIUM 125 UG/1
125 TABLET ORAL DAILY
Qty: 14 TABLET | Refills: 0 | OUTPATIENT
Start: 2024-09-08

## 2024-09-08 RX ORDER — LEVOTHYROXINE SODIUM 125 UG/1
125 TABLET ORAL DAILY
Qty: 90 TABLET | Refills: 3 | Status: SHIPPED | OUTPATIENT
Start: 2024-09-08

## 2024-09-08 NOTE — RESULT ENCOUNTER NOTE
"Your kidney function and liver testing is normal.  Your blood sugar is borderline elevated.  This is in the \"prediabetic\" range.  Exercise and limiting carbohydrate and sugars in diet can be helpful at preventing progression to diabetes.   Your cholesterol testing is higher than previous.  When considering these results and other risk factor assessment, your overall risk for heart disease is in a range that is best managed with healthy diet and exercise.  Thyroid testing is normal indicating you are on the correct dosage of thyroid medication. Refill will be updated now.  Your blood cell counts are normal.  Please call or [employer] message me if you have any questions.      PSK"

## 2024-09-12 ENCOUNTER — MYC REFILL (OUTPATIENT)
Dept: PSYCHIATRY | Facility: CLINIC | Age: 59
End: 2024-09-12
Payer: COMMERCIAL

## 2024-09-12 DIAGNOSIS — F90.9 ATTENTION DEFICIT HYPERACTIVITY DISORDER (ADHD), UNSPECIFIED ADHD TYPE: ICD-10-CM

## 2024-09-12 RX ORDER — DEXTROAMPHETAMINE SACCHARATE, AMPHETAMINE ASPARTATE, DEXTROAMPHETAMINE SULFATE AND AMPHETAMINE SULFATE 2.5; 2.5; 2.5; 2.5 MG/1; MG/1; MG/1; MG/1
10 TABLET ORAL DAILY
Qty: 30 TABLET | Refills: 0 | OUTPATIENT
Start: 2024-09-12

## 2024-09-12 RX ORDER — DEXTROAMPHETAMINE SACCHARATE, AMPHETAMINE ASPARTATE MONOHYDRATE, DEXTROAMPHETAMINE SULFATE AND AMPHETAMINE SULFATE 7.5; 7.5; 7.5; 7.5 MG/1; MG/1; MG/1; MG/1
30 CAPSULE, EXTENDED RELEASE ORAL DAILY
Qty: 30 CAPSULE | Refills: 0 | OUTPATIENT
Start: 2024-09-12

## 2024-09-12 NOTE — TELEPHONE ENCOUNTER
Medication(s) requested:   -  amphetamine-dextroamphetamine (ADDERALL XR) 30 MG 24 hr capsule Date last ordered: 7/10/2024  Qty: 30  Refills: 2 with start dates of 8/9/2024 and 9/8/2024  [id number] - [place], [place] - [address] AT [facility] [address]    Appropriate for refill? No: Pharmacy should have refill(s) on file     -  amphetamine-dextroamphetamine (ADDERALL) 10 MG tablet   Date last ordered: 7/10/2024  Qty: 30  Refills: 2 with start dates of 8/9/2024 and 9/8/2024    Sent to [id number] - [place], [place] - [address] AT [facility] ([address]  Appropriate for refill? No: Pharmacy should have refill(s) on file         Any Controlled Substance(s)? Yes   [place]  checked[place]   Dextroamp-Amphet ER 30 mg cap was last sold on 8/17/2024 for quantity of 30.  Dextroamp-Amphetamin 10 mg tab was last sold on 8/17/2024 for quantity of 30  Both script were for the 2/3 scripts sent on 7/10/2024.  Other controlled substance on [place] ?: No      Requested medication(s) verified as identical to current order? Yes    Any lapse in adherence to medication(s) greater than 5 days? N/A     Additional action taken? contacted patient via [employer] to contact the pharmacy directly as there should still be one more remaining refill on file at the pharmacy for both medications .      Mary Ramey RN on 9/12/2024 at 2:25 PM

## 2024-09-25 ENCOUNTER — E-VISIT (OUTPATIENT)
Dept: FAMILY MEDICINE | Facility: CLINIC | Age: 59
End: 2024-09-25
Payer: COMMERCIAL

## 2024-09-25 DIAGNOSIS — N89.8 VAGINAL DISCHARGE: Primary | ICD-10-CM

## 2024-09-25 DIAGNOSIS — N76.0 BACTERIAL VAGINOSIS: ICD-10-CM

## 2024-09-25 DIAGNOSIS — B96.89 BACTERIAL VAGINOSIS: ICD-10-CM

## 2024-09-25 DIAGNOSIS — R73.9 HYPERGLYCEMIA: ICD-10-CM

## 2024-09-25 PROCEDURE — 99421 OL DIG E/M SVC 5-10 MIN: CPT | Performed by: FAMILY MEDICINE

## 2024-10-07 ENCOUNTER — MYC REFILL (OUTPATIENT)
Dept: PSYCHIATRY | Facility: CLINIC | Age: 59
End: 2024-10-07

## 2024-10-07 ENCOUNTER — LAB (OUTPATIENT)
Dept: LAB | Facility: CLINIC | Age: 59
End: 2024-10-07
Payer: COMMERCIAL

## 2024-10-07 ENCOUNTER — MYC MEDICAL ADVICE (OUTPATIENT)
Dept: FAMILY MEDICINE | Facility: CLINIC | Age: 59
End: 2024-10-07

## 2024-10-07 DIAGNOSIS — N89.8 VAGINAL DISCHARGE: ICD-10-CM

## 2024-10-07 DIAGNOSIS — F39 MOOD DISORDER (H): ICD-10-CM

## 2024-10-07 LAB
CLUE CELLS: PRESENT
TRICHOMONAS, WET PREP: ABNORMAL
WBC'S/HIGH POWER FIELD, WET PREP: ABNORMAL
YEAST, WET PREP: ABNORMAL

## 2024-10-07 PROCEDURE — 87210 SMEAR WET MOUNT SALINE/INK: CPT

## 2024-10-07 RX ORDER — METRONIDAZOLE 500 MG/1
500 TABLET ORAL 2 TIMES DAILY
Qty: 14 TABLET | Refills: 0 | Status: SHIPPED | OUTPATIENT
Start: 2024-10-07 | End: 2024-10-14

## 2024-10-07 NOTE — RESULT ENCOUNTER NOTE
Your vaginal testing does not show yeast or trichomonas.  There is a non-sexually transmitted bacterial infection present.  Use of metronidazole is recommended to treat this. It can be treated orally or or with a vaginal treatment.  I will send the oral treatment in.  If you would prefer to do the vaginal instead, let me know.  Please call or MyChart message me if you have any questions.      MAX

## 2024-10-08 ENCOUNTER — MYC MEDICAL ADVICE (OUTPATIENT)
Dept: FAMILY MEDICINE | Facility: CLINIC | Age: 59
End: 2024-10-08
Payer: COMMERCIAL

## 2024-10-08 DIAGNOSIS — B96.89 BACTERIAL VAGINOSIS: Primary | ICD-10-CM

## 2024-10-08 DIAGNOSIS — N76.0 BACTERIAL VAGINOSIS: Primary | ICD-10-CM

## 2024-10-08 RX ORDER — LAMOTRIGINE 150 MG/1
300 TABLET ORAL DAILY
Qty: 60 TABLET | Refills: 0 | Status: SHIPPED | OUTPATIENT
Start: 2024-10-08 | End: 2024-10-18

## 2024-10-08 NOTE — TELEPHONE ENCOUNTER
Date of Last Office Visit: 7/10/24  Date of Next Office Visit: None; routing for [facility] to assist pt with scheduling.    No shows since last visit: No  More than one patient-initiated cancellation (with reschedule) since last seen in clinic? YES 9/11/24 Canceled (Other)    []Medication refilled per  Medication Refill in Ambulatory Care  policy.  [x]Medication unable to be refilled by RN due to criteria not met as indicated below:    []Eligibility: has not had a provider visit within last 6 months   [x]Supervision: no future appointment; < 7 days before next appointment   []Compliance: no shows; cancellations; lapse in therapy   []Verification: order discrepancy; may need modification...   [] > 30-day supply request   []Advanced refill request: > 7 days before refill date   []Controlled medication   [x]Medication not included in policy   []Review: new med; med adjusted ? 30 days; safety alert; requires lab monitoring...   []Scope of Practice: refill request processed by LPN/MA   []Other:      Medication(s) requested:     -  lamoTRIgine (LAMICTAL) 150 MG tablet   Date last ordered: 7/10/24  Qty: 180  Refills: 0  Appropriate for refill? Provider to review.        Any Controlled Substance(s)? No      Requested medication(s) verified as identical to current order? Yes - 90 day supply    Any lapse in adherence to medication(s) greater than 5 days? Unknown     Additional action taken? routed encounter to [facility] to assist pt with scheduling an appointment.      Last visit treatment plan:   Treatment Plan:  Continue Adderall IR 10 mg IR daily as needed for ADHD  Restart Adderall XR at 30 mg daily as needed for ADHD.  Continue fluoxetine 40 mg daily for mood and anxiety  Increase lamotrigine to 150 mg twice daily, or 300 mg once daily for mood.  Continue to monitor for any rashes.  Continue quetiapine/Seroquel 25 mg at bedtime as needed for sleep  Discontinue Vyvanse   Continue all other cares per primary care provider.   Continue  all other medications as reviewed per electronic medical record today.   Safety plan reviewed. To the Emergency Department as needed or call after hours crisis line at [phone] or [phone]. [place] Crisis Text Line. Text [place] to 562742 or Suicide LifeLine Chat: suicidepreventionlifeline.org/chat  Consider psychotherapy as nonpharmacotherapy option. Strongly consider DBT.   Schedule an appointment with me in 2 months or sooner as needed. Call [facility] at [phone] to schedule.  Follow up with primary care provider as planned or for acute medical concerns.  Call the psychiatric nurse line with medication questions or concerns at [phone].  MyChart may be used to communicate with your provider, but this is not intended to be used for emergencies.    Any medication(s) require lab monitoring?  Unknown

## 2024-10-09 NOTE — TELEPHONE ENCOUNTER
Chart review shows visit to ER on 7/31/21 for an allergic reaction. Notes show that patient had hand and feet swelling after starting Doxycycline and flagyl.     Routing [employer] message to provider. RN did send response asking about reaction to metronidazole. Raghu Hummel RN, BSN

## 2024-10-10 RX ORDER — CLINDAMYCIN PHOSPHATE 20 MG/G
1 CREAM VAGINAL AT BEDTIME
Qty: 40 G | Refills: 1 | Status: SHIPPED | OUTPATIENT
Start: 2024-10-10 | End: 2024-10-10

## 2024-10-10 RX ORDER — CLINDAMYCIN PHOSPHATE 20 MG/G
1 CREAM VAGINAL AT BEDTIME
Qty: 40 G | Refills: 1 | Status: SHIPPED | OUTPATIENT
Start: 2024-10-10

## 2024-10-12 ENCOUNTER — HEALTH MAINTENANCE LETTER (OUTPATIENT)
Age: 59
End: 2024-10-12

## 2024-10-15 ENCOUNTER — MYC REFILL (OUTPATIENT)
Dept: PSYCHIATRY | Facility: CLINIC | Age: 59
End: 2024-10-15
Payer: COMMERCIAL

## 2024-10-15 DIAGNOSIS — F90.9 ATTENTION DEFICIT HYPERACTIVITY DISORDER (ADHD), UNSPECIFIED ADHD TYPE: ICD-10-CM

## 2024-10-15 RX ORDER — DEXTROAMPHETAMINE SACCHARATE, AMPHETAMINE ASPARTATE MONOHYDRATE, DEXTROAMPHETAMINE SULFATE AND AMPHETAMINE SULFATE 7.5; 7.5; 7.5; 7.5 MG/1; MG/1; MG/1; MG/1
30 CAPSULE, EXTENDED RELEASE ORAL DAILY
Qty: 30 CAPSULE | Refills: 0 | Status: SHIPPED | OUTPATIENT
Start: 2024-10-15 | End: 2024-10-18

## 2024-10-15 RX ORDER — DEXTROAMPHETAMINE SACCHARATE, AMPHETAMINE ASPARTATE, DEXTROAMPHETAMINE SULFATE AND AMPHETAMINE SULFATE 2.5; 2.5; 2.5; 2.5 MG/1; MG/1; MG/1; MG/1
10 TABLET ORAL DAILY
Qty: 30 TABLET | Refills: 0 | Status: SHIPPED | OUTPATIENT
Start: 2024-10-15 | End: 2024-10-18

## 2024-10-15 NOTE — TELEPHONE ENCOUNTER
Date of Last Office Visit: 7/10/24  Date of Next Office Visit:  10/18/24  No shows since last visit: No  More than one patient-initiated cancellation (with reschedule) since last seen in clinic? No    []Medication refilled per  Medication Refill in Ambulatory Care  policy.  [x]Medication unable to be refilled by RN due to criteria not met as indicated below:    []Eligibility: has not had a provider visit within last 6 months   []Supervision: no future appointment; < 7 days before next appointment   []Compliance: no shows; cancellations; lapse in therapy   []Verification: order discrepancy; may need modification...   [] > 30-day supply request   []Advanced refill request: > 7 days before refill date   [x]Controlled medication   []Medication not included in policy   []Review: new med; med adjusted ? 30 days; safety alert; requires lab monitoring...   []Scope of Practice: refill request processed by LPN/MA   []Other:      Medication(s) requested:     -  amphetamine-dextroamphetamine (ADDERALL) 10 MG tablet   Date last ordered: 9/8/24  Qty: 30  Refills: 0       -  amphetamine-dextroamphetamine (ADDERALL XR) 30 MG 24 hr capsule   Date last ordered: 9/8/24  Qty: 30  Refills: 0        Any Controlled Substance(s)? Yes   [place]  checked? [place]   Adderall XR 30mg  was last sold on 9/18/24 for quantity of 30.  Adderall 10mg was last sold on 9/18/24 for quantity of 30.  Other controlled substance on [place] ?: Yes   If yes, are any new medications? No      Requested medication(s) verified as identical to current order? Yes    Any lapse in adherence to medication(s) greater than 5 days? No      Additional action taken? routed encounter to provider for review.      Last visit treatment plan:       Treatment Plan:  Continue Adderall IR 10 mg IR daily as needed for ADHD  Restart Adderall XR at 30 mg daily as needed for ADHD.  Continue fluoxetine 40 mg daily for mood and anxiety  Increase lamotrigine to 150 mg twice daily, or 300 mg once  daily for mood.  Continue to monitor for any rashes.  Continue quetiapine/Seroquel 25 mg at bedtime as needed for sleep  Discontinue Vyvanse   Continue all other cares per primary care provider.   Continue all other medications as reviewed per electronic medical record today.   Safety plan reviewed. To the Emergency Department as needed or call after hours crisis line at [phone] or [phone]. [place] Crisis Text [place]. Text [place] to 808966 or Suicide LifeLine Chat: suicidepreventionlifeline.org/chat  Consider psychotherapy as nonpharmacotherapy option. Strongly consider DBT.   Schedule an appointment with me in 2 months or sooner as needed    Any medication(s) require lab monitoring? No

## 2024-10-16 NOTE — PROGRESS NOTES
[facility]  October 18, 2024      Behavioral Health Clinician Progress Note    Patient Name: Brittany Rooney           Service Type:  Individual      [facility] Email (patient reached)     Session Start Time: 1008am  Session End Time: 1029am      Session Length: 16 - 37      Attendees: Client     Service Modality:  Video Visit:      Provider verified identity through the following two step process.  Patient provided:  Patient is known previously to provider    Telemedicine Visit: The patient's condition can be safely assessed and treated via synchronous audio and visual telemedicine encounter.      Reason for Telemedicine Visit: Services only offered telehealth    Originating Site (Patient Location): Patient's home    Distant Site (Provider Location): [facility]    Consent:  The patient/guardian has verbally consented to: the potential risks and benefits of telemedicine (video visit) versus in person care; bill my insurance or make self-payment for services provided; and responsibility for payment of non-covered services.     Patient would like the video invitation sent by:  My Chart    Mode of Communication:  Video Conference via [facility]    Distant Location (Provider):  On-site    As the provider I attest to compliance with applicable laws and regulations related to telemedicine.    Visit Activities (Refresh list every visit): [facility] Only    Diagnostic Assessment Date: 10/24/23 NADYA Hinojosa [facility]  Treatment Plan Review Date: 10/18/24  See Flowsheets for today's PHQ-9 and GALE-7 results  Previous PHQ-9:       10/24/2023     3:18 PM 2/9/2024     7:28 AM 10/18/2024     9:32 AM   PHQ-9 SCORE   PHQ-9 Total Score [facility] 14 (Moderate depression) 12 (Moderate depression) 11 (Moderate depression)   PHQ-9 Total Score 14 12    12 11    11     Previous GALE-7:       2/9/2024     7:29 AM 7/10/2024     9:10  AM 10/18/2024     9:32 AM   GALE-7 SCORE   Total Score 17 (severe anxiety) 18 (severe anxiety) 15 (severe anxiety)   Total Score 17    17 18    18 15    15       DATA  Extended Session (60+ minutes): No  Interactive Complexity: No  Crisis: No  [facility] Patient: No    Treatment Objective(s) Addressed in This Session:  Target Behavior(s): disease management/lifestyle changes reducing ADHD sx; stabilizing mood    Depressed Mood: Increase interest, engagement, and pleasure in doing things  Decrease frequency and intensity of feeling down, depressed, hopeless  Improve concentration, focus, and mindfulness in daily activities   Anxiety: will experience a reduction in anxiety and will develop more effective coping skills to manage anxiety symptoms    Current Stressors / Issues:  MH update:  Been out of Prozac for a few weeks.  Been having a tough time with Adderall as well.  Depression and anxiety sx higher.  ADHD sx higher.  Impulsivity higher.  Racing thoughts.   Emotional.  Engaging in compulsive cleaning behaviors.   Stresses:  Son having some MH/CD concerns.  Brother is in longterm.   Work stress.  Contentious with partner   Appetite: n/a  Sleep: n/a  Outpatient Provider updates: Denies.  SI/SIB/HI:  Suicidal ideation without plan or intent.  Safety plan on file.  one previous attempt ETOH/cut wrists in 2021.  GOLDY:   unknown  Side effects/compliance:  Interventions:  [facility] discussed high interpersonal dynamics   Most important:  Sx higher.  Can't get meds filled often been out of Prozac.     7/10  MH update:  White knuckling life without ADHD meds.  Can't ever Vyvanse.  CAN get 10mg of Adderall.  Can't focus, concentrate, low motivation.  Has had July off, goes back to work in August.  Anxiety is tough at times.  Mood is more down, feeling more on edge, feeling very obsessive/compulsive.  Doom scrolling.   Stresses: interpersonal distress, work-high school sports  Appetite: N/a  Sleep: N/a  Outpatient Provider updates: Stopped  [facility].  SI/SIB/HI: Denies current.  one previous attempt ETOH/cut wrists in 2021.  GOLDY:  on going ETOH absence.  THC occasionally.  Again newly, smoking nicotine.  Side effects/compliance: N/a  Interventions:  [facility] engaged in discussion about ADHD sx and interventions.  Discussed figits including chewies  Most important:  Can't ever get ADHD meds.  Lamictal very helpful but less so since last appt.  Recognizing some ASD sx.    2/9   update:  Feels like Lamictal has been helpful has been out for 5 days.  Less internal agitation.  Likely less depressive sx.  Also out of Vyvanse 2 days.  Taking short term Adderall today.  Anxiety has been high.  Picking at things/restless.  Denies panic  Stresses: interpersonal distress, work-high school sports  Appetite: N/a  Sleep: Poor variable, recently increased  Outpatient Provider updates: [facility] NADYA Hinojosa, previously recommended DBT  SI/SIB/HI:  Intermittent hx of passive ideation, no plan/intent, one noted previous attempt ETOH/cut wrists in 2021.  GOLDY: Recent ETOH abstance after increased concerns.  THC occasionally  Side effects/compliance: as above  Interventions:  [facility] discussed ADHD interventions/time mgmt  Most important:  Get back on meds, titrate back on Lamictal?  Wonders about increasing Vyvanse? And or adding Adderall short term    Progress on Treatment Objective(s) / Homework:  New Objective established this session - CONTEMPLATION (Considering change and yet undecided); Intervened by assessing the negative and positive thinking (ambivalence) about behavior change    Motivational Interviewing    MI Intervention: Co-Developed Goal: reducing ADHD sx, increasing mood stabilization, Expressed Empathy/Understanding, and Open-ended questions     Change Talk Expressed by the Patient: Desire to change Reasons to change    Provider Response to Change Talk: E - Evoked more info from patient about behavior change and A - Affirmed patient's thoughts, decisions, or  attempts at behavior change    Also provided psychoeducation about behavioral health condition, symptoms, and treatment options    Assessments completed prior to visit:  The following assessments were completed by patient for this visit:  [place] Suicide Severity Rating Scale (Lifetime/Recent)      10/9/2023    11:06 AM 2/9/2024     7:58 AM   [place] Suicide Severity Rating (Lifetime/Recent)   Q1 Wish to be Dead (Lifetime) Y Y   Wish to be Dead Description (Lifetime) Thoughts that things would be easier if she were not here    1. Wish to be Dead (Past 1 Month) N Y   Q2 Non-Specific Active Suicidal Thoughts (Lifetime) N Y   2. Non-Specific Active Suicidal Thoughts (Past 1 Month)  N   3. Active Suicidal Ideation with any Methods (Not Plan) Without Intent to Act (Lifetime)  Y   Q3 Active Suicidal Ideation with any Methods (Not Plan) Without Intent to Act (Past 1 Month)  N   Q4 Active Suicidal Ideation with Some Intent to Act, Without Specific Plan (Lifetime)  Y   4. Active Suicidal Ideation with Some Intent to Act, Without Specific Plan (Past 1 Month)  N   Q5 Active Suicidal Ideation with Specific Plan and Intent (Lifetime)  Y   5. Active Suicidal Ideation with Specific Plan and Intent (Past 1 Month)  N   Most Severe Ideation Rating (Lifetime)  5   Most Severe Ideation Rating (Past 1 Month)  1   Frequency (Lifetime)  3   Frequency (Past 1 Month)  1   Duration (Lifetime)  3   Duration (Past 1 Month)  1   Controllability (Lifetime)  5   Controllability (Past 1 Month)  1   Deterrents (Lifetime) 1 5   Deterrents (Past 1 Month)  1   Reasons for Ideation (Lifetime) 4 5   Reasons for Ideation (Past 1 Month)  5   Actual Attempt (Lifetime) N Y   Total Number of Actual Attempts (Lifetime)  1   Actual Attempt Description (Lifetime)  ETOH and cut wrists   Actual Attempt (Past 3 Months)  N   Has subject engaged in non-suicidal self-injurious behavior? (Lifetime) N N   Interrupted Attempts (Lifetime) N N   Aborted or  Self-Interrupted Attempt (Lifetime) N N   Preparatory Acts or Behavior (Lifetime) N N   Calculated C-SSRS Risk Score (Lifetime/Recent) No Risk Indicated Moderate Risk       Care Plan review completed: Yes    Medication Review:  Changes to psychiatric medications, see updated Medication List in EPIC.     Medication Compliance:  Yes    Changes in Health Issues:   None reported    Chemical Use Review:   Substance Use: Chemical use reviewed, no active concerns identified      Tobacco Use: No change in amount of tobacco use since last session.  Patient declined discussion at this time    Assessment: Current Emotional / Mental Status (status of significant symptoms):  Risk status (Self / Other harm or suicidal ideation)  Patient has had a history of suicidal ideation: passive ideation noted previously and suicide attempts: previous noted attempted in 2021 ETOH/cut wrists  Patient denies current fears or concerns for personal safety.  Patient denies current or recent suicidal ideation or behaviors.  Patient denies current or recent homicidal ideation or behaviors.  Patient denies current or recent self injurious behavior or ideation.  Patient denies other safety concerns.  A safety and risk management plan has not been developed at this time, however patient was encouraged to call [place] [phone] should there be a change in any of these risk factors.    Appearance:   Appropriate   Eye Contact:   Good   Psychomotor Behavior: Normal   Attitude:   Cooperative   Orientation:   All  Speech   Rate / Production: Normal    Volume:  Normal   Mood:    Anxious  Depressed    Affect:    Appropriate   Thought Content:  Clear   Thought Form:  Coherent  Logical   Insight:    Good     Diagnoses:  No diagnosis found.        Collateral Reports Completed:  Communicated with: Dr Calle    Plan: (Homework, other):  Patient was given information about behavioral services and encouraged to schedule a follow up appointment with the clinic [facility]  as needed.  She was also given information about mental health symptoms and treatment options .  CD Recommendations: No indications of CD issues.       Milagro Melgar, [facility]    ______________________________________________________________________    Integrated Primary Care Behavioral Health Treatment Plan    Patient's Name: Brittany Rooney  YOB: 1965    Date of Creation: 2/9/24  Date Treatment Plan Last Reviewed/Revised: 10/18/24    DSM5 Diagnoses:   1. Generalized anxiety disorder    2. Attention deficit hyperactivity disorder (ADHD), unspecified ADHD type    3. Mood disorder (H24)      Psychosocial / Contextual Factors: interpersonal distress  PROMIS (reviewed every 90 days):     The following assessments were completed by patient for this visit:  PROMIS 10-Global Health (only subscores and total score):       8/11/2023     1:59 PM 10/9/2023     9:56 AM 10/24/2023     3:19 PM 2/9/2024     7:29 AM 7/10/2024     9:11 AM 10/18/2024     9:33 AM   PROMIS-10 Scores Only   Global Mental Health Score 6 5 7 9    9 7    7 7    7   Global Physical Health Score 15 15 16 16    16 16    16 15    15   PROMIS TOTAL - SUBSCORES 21 20 23 25    25 23    23 22    22       Referral / Collaboration:  Referral to another professional/service is not indicated at this time..    Anticipated number of session for this episode of care: 5-6  Anticipation frequency of session: Every other week  Anticipated Duration of each session: 16-37 minutes  Treatment plan will be reviewed in 90 days or when goals have been changed.       MeasurableTreatment Goal(s) related to diagnosis / functional impairment(s)  Goal 1: Patient will work on reducing anxiety via establishing boundaries    I will know I've met my goal when I have boundaries with family and friends.      Objective #A (Patient Action)    Patient will compile a list of boundaries that they would like to set with others. . .  Status: Continued - Date(s):10/18/24      Intervention(s)  Therapist will provide support through CBT, MI, Acceptance and Commitment Therapy, Dialectic Behavioral Therapy and problem solving model to explore and overcome barriers.    Goal 2: Patient will manage concerns of safety    I will know I've met my goal when I can reduce suicidal ideation .      Objective #A (Patient Action)    Patient will use previously developed safety plan on file.  Status: New - Date: 10/18/24     Intervention(s)  Therapist will provide support through CBT, MI, Acceptance and Commitment Therapy, Dialectic Behavioral Therapy and problem solving model to explore and overcome barriers.      Patient has reviewed and agreed to the above plan.      Milagro Melgar, [facility]  October 18, 2024

## 2024-10-18 ENCOUNTER — VIRTUAL VISIT (OUTPATIENT)
Dept: PSYCHIATRY | Facility: CLINIC | Age: 59
End: 2024-10-18
Payer: COMMERCIAL

## 2024-10-18 ENCOUNTER — VIRTUAL VISIT (OUTPATIENT)
Dept: BEHAVIORAL HEALTH | Facility: CLINIC | Age: 59
End: 2024-10-18
Payer: COMMERCIAL

## 2024-10-18 DIAGNOSIS — F90.9 ATTENTION DEFICIT HYPERACTIVITY DISORDER (ADHD), UNSPECIFIED ADHD TYPE: ICD-10-CM

## 2024-10-18 DIAGNOSIS — F39 MOOD DISORDER (H): ICD-10-CM

## 2024-10-18 DIAGNOSIS — F41.9 ANXIETY: ICD-10-CM

## 2024-10-18 DIAGNOSIS — F41.1 GENERALIZED ANXIETY DISORDER: Primary | ICD-10-CM

## 2024-10-18 DIAGNOSIS — F90.9 ATTENTION DEFICIT HYPERACTIVITY DISORDER (ADHD), UNSPECIFIED ADHD TYPE: Primary | ICD-10-CM

## 2024-10-18 PROCEDURE — 90832 PSYTX W PT 30 MINUTES: CPT | Mod: 95 | Performed by: COUNSELOR

## 2024-10-18 PROCEDURE — G2211 COMPLEX E/M VISIT ADD ON: HCPCS | Mod: 95 | Performed by: PSYCHIATRY & NEUROLOGY

## 2024-10-18 PROCEDURE — 99214 OFFICE O/P EST MOD 30 MIN: CPT | Mod: 95 | Performed by: PSYCHIATRY & NEUROLOGY

## 2024-10-18 RX ORDER — FLUOXETINE 40 MG/1
40 CAPSULE ORAL DAILY
Qty: 90 CAPSULE | Refills: 1 | Status: CANCELLED | OUTPATIENT
Start: 2024-10-18

## 2024-10-18 RX ORDER — DEXTROAMPHETAMINE SACCHARATE, AMPHETAMINE ASPARTATE MONOHYDRATE, DEXTROAMPHETAMINE SULFATE AND AMPHETAMINE SULFATE 7.5; 7.5; 7.5; 7.5 MG/1; MG/1; MG/1; MG/1
30 CAPSULE, EXTENDED RELEASE ORAL DAILY
Qty: 30 CAPSULE | Refills: 0 | Status: SHIPPED | OUTPATIENT
Start: 2024-10-18

## 2024-10-18 RX ORDER — DEXTROAMPHETAMINE SACCHARATE, AMPHETAMINE ASPARTATE, DEXTROAMPHETAMINE SULFATE AND AMPHETAMINE SULFATE 2.5; 2.5; 2.5; 2.5 MG/1; MG/1; MG/1; MG/1
10 TABLET ORAL DAILY
Qty: 30 TABLET | Refills: 0 | Status: SHIPPED | OUTPATIENT
Start: 2024-10-18

## 2024-10-18 RX ORDER — LAMOTRIGINE 150 MG/1
300 TABLET ORAL DAILY
Qty: 180 TABLET | Refills: 1 | Status: SHIPPED | OUTPATIENT
Start: 2024-10-18 | End: 2024-11-12

## 2024-10-18 RX ORDER — FLUOXETINE 40 MG/1
40 CAPSULE ORAL DAILY
Qty: 90 CAPSULE | Refills: 1 | Status: SHIPPED | OUTPATIENT
Start: 2024-10-18 | End: 2024-11-12

## 2024-10-18 ASSESSMENT — ANXIETY QUESTIONNAIRES
3. WORRYING TOO MUCH ABOUT DIFFERENT THINGS: NEARLY EVERY DAY
8. IF YOU CHECKED OFF ANY PROBLEMS, HOW DIFFICULT HAVE THESE MADE IT FOR YOU TO DO YOUR WORK, TAKE CARE OF THINGS AT HOME, OR GET ALONG WITH OTHER PEOPLE?: SOMEWHAT DIFFICULT
GAD7 TOTAL SCORE: 15
4. TROUBLE RELAXING: SEVERAL DAYS
7. FEELING AFRAID AS IF SOMETHING AWFUL MIGHT HAPPEN: SEVERAL DAYS
1. FEELING NERVOUS, ANXIOUS, OR ON EDGE: NEARLY EVERY DAY
IF YOU CHECKED OFF ANY PROBLEMS ON THIS QUESTIONNAIRE, HOW DIFFICULT HAVE THESE PROBLEMS MADE IT FOR YOU TO DO YOUR WORK, TAKE CARE OF THINGS AT HOME, OR GET ALONG WITH OTHER PEOPLE: SOMEWHAT DIFFICULT
GAD7 TOTAL SCORE: 15
GAD7 TOTAL SCORE: 15
6. BECOMING EASILY ANNOYED OR IRRITABLE: NEARLY EVERY DAY
3. WORRYING TOO MUCH ABOUT DIFFERENT THINGS: NEARLY EVERY DAY
GAD7 TOTAL SCORE: 15
8. IF YOU CHECKED OFF ANY PROBLEMS, HOW DIFFICULT HAVE THESE MADE IT FOR YOU TO DO YOUR WORK, TAKE CARE OF THINGS AT HOME, OR GET ALONG WITH OTHER PEOPLE?: SOMEWHAT DIFFICULT
GAD7 TOTAL SCORE: 15
7. FEELING AFRAID AS IF SOMETHING AWFUL MIGHT HAPPEN: SEVERAL DAYS
4. TROUBLE RELAXING: SEVERAL DAYS
7. FEELING AFRAID AS IF SOMETHING AWFUL MIGHT HAPPEN: SEVERAL DAYS
GAD7 TOTAL SCORE: 15
5. BEING SO RESTLESS THAT IT IS HARD TO SIT STILL: SEVERAL DAYS
6. BECOMING EASILY ANNOYED OR IRRITABLE: NEARLY EVERY DAY
7. FEELING AFRAID AS IF SOMETHING AWFUL MIGHT HAPPEN: SEVERAL DAYS
IF YOU CHECKED OFF ANY PROBLEMS ON THIS QUESTIONNAIRE, HOW DIFFICULT HAVE THESE PROBLEMS MADE IT FOR YOU TO DO YOUR WORK, TAKE CARE OF THINGS AT HOME, OR GET ALONG WITH OTHER PEOPLE: SOMEWHAT DIFFICULT
1. FEELING NERVOUS, ANXIOUS, OR ON EDGE: NEARLY EVERY DAY
2. NOT BEING ABLE TO STOP OR CONTROL WORRYING: NEARLY EVERY DAY
5. BEING SO RESTLESS THAT IT IS HARD TO SIT STILL: SEVERAL DAYS
2. NOT BEING ABLE TO STOP OR CONTROL WORRYING: NEARLY EVERY DAY

## 2024-10-18 ASSESSMENT — PATIENT HEALTH QUESTIONNAIRE - PHQ9
10. IF YOU CHECKED OFF ANY PROBLEMS, HOW DIFFICULT HAVE THESE PROBLEMS MADE IT FOR YOU TO DO YOUR WORK, TAKE CARE OF THINGS AT HOME, OR GET ALONG WITH OTHER PEOPLE: SOMEWHAT DIFFICULT
SUM OF ALL RESPONSES TO PHQ QUESTIONS 1-9: 11
SUM OF ALL RESPONSES TO PHQ QUESTIONS 1-9: 11
10. IF YOU CHECKED OFF ANY PROBLEMS, HOW DIFFICULT HAVE THESE PROBLEMS MADE IT FOR YOU TO DO YOUR WORK, TAKE CARE OF THINGS AT HOME, OR GET ALONG WITH OTHER PEOPLE: SOMEWHAT DIFFICULT
SUM OF ALL RESPONSES TO PHQ QUESTIONS 1-9: 11
SUM OF ALL RESPONSES TO PHQ QUESTIONS 1-9: 11

## 2024-10-18 NOTE — PROGRESS NOTES
"Virtual Visit Details    Type of service:  Video Visit     Originating Location (pt. Location): {video visit patient location:804788::\"Home\"}  {PROVIDER LOCATION On-site should be selected for visits conducted from your clinic location or adjoining [facility] hospital, academic office, or other nearby [facility] building. Off-site should be selected for all other provider locations, including home:242963}  Distant Location (provider location):  {virtual location provider:190835}  Platform used for Video Visit: {Virtual Visit Platforms:907553::\"[employer]\"}  "

## 2024-10-18 NOTE — PATIENT INSTRUCTIONS
Treatment Plan:  Continue Adderall IR 10 mg IR daily as needed for ADHD  Continue Adderall XR at 30 mg daily as needed for ADHD.  Re-start/Continue fluoxetine 40 mg daily for mood and anxiety (ok to take every other day for 1-2 weeks when restarting)  Continue lamotrigine 150 mg twice daily, or 300 mg once daily for mood.  Continue to monitor for any rashes.  Continue all other cares per primary care provider.   Continue all other medications as reviewed per electronic medical record today.   Safety plan reviewed. To the Emergency Department as needed or call after hours crisis line at [phone] or [phone]. [place] Crisis Text Line. Text [place] to 062742 or Suicide LifeLine Chat: suicidepreventionlifeline.org/chat  Consider psychotherapy as nonpharmacotherapy option. Strongly consider DBT.   Schedule an appointment with me in 4-6 weeks or sooner as needed. Call [facility] at [phone] to schedule.  Follow up with primary care provider as planned or for acute medical concerns.  Call the psychiatric nurse line with medication questions or concerns at [phone].  [employer] may be used to communicate with your provider, but this is not intended to be used for emergencies.    Have previously discussed risks of stimulant medication including, but not limited to, decreased appetite, risk of tics (and that they may be lasting), trouble sleeping, cardiac risks such as increased heart rate and blood pressure, and rare risk of sudden cardiac death.  Also risk of addiction/tolerance/dependence.  Also have discussed newer evidence that came out November 2023 showing up to 23% increased risk for cardiovascular diseases-particularly high blood pressure and arterial sclerosis/hardening of the arteries.

## 2024-10-18 NOTE — NURSING NOTE
Current patient location: [address] [place] [address] [address]  [place] [address]    Is the patient currently in the state of [place]? YES    Visit mode:VIDEO    If the visit is dropped, the patient can be reconnected by: VIDEO VISIT: Text to cell phone:   Telephone Information:   Mobile [phone]    and VIDEO VISIT: Send to e-mail at: [email or web address]    Will anyone else be joining the visit? NO  (If patient encounters technical issues they should call [phone])    Are changes needed to the allergy or medication list? No    Are refills needed on medications prescribed by this physician? YES    FLUoxetine (PROZAC) 40 MG capsule     Rooming Documentation:  Questionnaire(s) completed    Reason for visit: RECHECK    Rocio HUANG

## 2024-10-18 NOTE — PROGRESS NOTES
"Telemedicine Visit: The patient's condition can be safely assessed and treated via synchronous audio and visual telemedicine encounter.      Reason for Telemedicine Visit: Patient has requested telehealth visit    Originating Site (Patient Location): Patient's home    Distant Location (provider location):  On-Site    Consent:  The patient/guardian has verbally consented to: the potential risks and benefits of telemedicine (video visit) versus in person care; bill my insurance or make self-payment for services provided; and responsibility for payment of non-covered services.     Mode of Communication:  Video Conference via [employer]    As the provider I attest to compliance with applicable laws and regulations related to telemedicine.           Outpatient Psychiatric Progress Note    Name: Brittany LOWE Nevin   : 1965                    Primary Care Provider: Esther Armstrong MD   Therapist: None    PHQ-9 scores:      10/24/2023     3:18 PM 2024     7:28 AM 10/18/2024     9:32 AM   PHQ-9 SCORE   PHQ-9 Total Score MyChart 14 (Moderate depression) 12 (Moderate depression) 11 (Moderate depression)   PHQ-9 Total Score 14 12    12 11    11       GALE-7 scores:      2024     7:29 AM 7/10/2024     9:10 AM 10/18/2024     9:32 AM   GALE-7 SCORE   Total Score 17 (severe anxiety) 18 (severe anxiety) 15 (severe anxiety)   Total Score 17    17 18    18 15    15       Patient Identification:  Patient is a 59 year old,   White Not  or  female  who presents for return visit with me.  Patient is currently employed full time. Patient attended the phone/video session alone. Patient prefers to be called: \"Veronica\".    Interim History:  I last saw Brittany Rooney for outpatient psychiatry return visit on 7/10/2024. During that appointment, we:    Continue Adderall IR 10 mg IR daily as needed for ADHD  Restart Adderall XR at 30 mg daily as needed for ADHD.  Continue fluoxetine 40 mg daily for mood " "and anxiety  Increase lamotrigine to 150 mg twice daily, or 300 mg once daily for mood.  Continue to monitor for any rashes.  Continue quetiapine/Seroquel 25 mg at bedtime as needed for sleep  Discontinue Vyvanse     10/18: Patient struggling more recently.  High psychosocial stressors.  Also ran out of fluoxetine 1-2 months ago.  Has struggled to get Adderall consistently.  No acute safety concerns.  No SI.  No problematic drug or alcohol use.  See [facility] note below for additional details.    Per [facility], [facility], during today's team-based visit:  MH update:  Been out of Prozac for a few weeks.  Been having a tough time with Adderall as well.  Depression and anxiety sx higher.  ADHD sx higher.  Impulsivity higher.  Racing thoughts.   Emotional.  Engaging in compulsive cleaning behaviors.   Stresses:  Son having some MH/CD concerns.  Brother is in prison.   Work stress.  Contentious with partner   Appetite: n/a  Sleep: n/a  Outpatient Provider updates: Denies.  SI/SIB/HI:  Suicidal ideation without plan or intent.  Safety plan on file.  one previous attempt ETOH/cut wrists in 2021.  GOLDY:   unknown  Side effects/compliance:  Interventions:  [facility] discussed high interpersonal dynamics   Most important:  Sx higher.  Can't get meds filled often been out of Prozac.     Past Psychiatric Med Trials:  Psych Meds at Intake:  Adderall XR 20 mg daily   fluoxetine 40 mg daily - \"I don't know if it does anything for me\" increased to 40 mg a year ago, no side effects  Quetiapine 25 mg at bedtime  - on 2 weeks, not sure if feel different      Past Psych Meds:  Sertraline - bad on stomach  Wellbutrin     Psychiatric ROS:  Brittany Rooney reports mood has been: See HPI above  Anxiety has been: See HPI above  Sleep has been: See HPI above  Susan sxs: None  Psychosis sxs: None  ADHD/ADD sxs: See HPI above  PTSD sxs: NA  PHQ9 and GAD7 scores were reviewed today if completed.   Medication side effects: Denies  Current stressors " include: Symptoms and see HPI above  Coping mechanisms and supports include: Family, Hobbies, and Friends    Current medications include:   Current Outpatient Medications   Medication Sig Dispense Refill    amphetamine-dextroamphetamine (ADDERALL XR) 30 MG 24 hr capsule Take 1 capsule (30 mg) by mouth daily. 30 capsule 0    amphetamine-dextroamphetamine (ADDERALL) 10 MG tablet Take 1 tablet (10 mg) by mouth daily. 30 tablet 0    benzonatate (TESSALON) 100 MG capsule Take 1 capsule (100 mg) by mouth 3 times daily as needed for cough 30 capsule 0    Cholecalciferol (VITAMIN D) 2000 UNIT tablet Take 1 tablet by mouth 2 times daily. 100 tablet 6    clindamycin (CLEOCIN) 2 % vaginal cream Place 1 applicator vaginally at bedtime. 40 g 1    clobetasol (TEMOVATE) 0.05 % external ointment Apply topically 2 times daily +++ appointment needed for additional refills +++ 15 g 0    clobetasol (TEMOVATE) 0.05 % external ointment APPLY TOPICALLY TO THE AFFECTED AREA TWICE DAILY (Patient not taking: Reported on 7/10/2023) 30 g 0    FLUoxetine (PROZAC) 40 MG capsule Take 1 capsule (40 mg) by mouth daily 90 capsule 1    IBUPROFEN 200 MG OR CAPS 2 CAPSULE EVERY 4 TO 6 HOURS AS NEEDED      lamoTRIgine (LAMICTAL) 150 MG tablet Take 2 tablets (300 mg) by mouth daily. Need appt for refills. 60 tablet 0    levothyroxine (SYNTHROID/LEVOTHROID) 125 MCG tablet Take 1 tablet (125 mcg) by mouth daily. 90 tablet 3    mometasone (ELOCON) 0.1 % external cream Apply  topically as needed. 30 g 0     No current facility-administered medications for this visit.       The [place] Prescription Monitoring Program has been reviewed and there are no concerns about diversionary activity for controlled substances at this time.   09/18/2024 07/10/2024 1 Dextroamp-Amphetamin 10 Mg Tab 30.00 30 Al Bau [id number] [facility] (1802) 0/0  [place]   09/18/2024 07/10/2024 1 Dextroamp-Amphet Er 30 Mg Cap 30.00 30 Al Bau [id number] [facility] (1802) 0/[place]  [place]   08/17/2024  07/10/2024 1 Dextroamp-Amphetamin 10 Mg Tab 30.00 30 Al u [id number] [facility] (1802) 0/0  [place]   08/17/2024 07/10/2024 1 Dextroamp-Amphet Er 30 Mg Cap 30.00 30 Al [facility] [id number] [facility] (1802) 0/0  [place]       Past Medical/Surgical History:  Past Medical History:   Diagnosis Date    Abnormal Pap smear of cervix 01/22/2019    see problem list    Attention deficit disorder with hyperactivity(314.01)     Major depressive disorder, recurrent episode, unspecified     Morbid obesity (H)     Nontoxic uninodular goiter     Other malaise and fatigue     Other malaise and fatigue     Plantar fascial fibromatosis     Thyroid nodule       has a past medical history of Abnormal Pap smear of cervix (01/22/2019), Attention deficit disorder with hyperactivity(314.01), Major depressive disorder, recurrent episode, unspecified, Morbid obesity (H), Nontoxic uninodular goiter, Other malaise and fatigue, Other malaise and fatigue, Plantar fascial fibromatosis, and Thyroid nodule.    She has no past medical history of Arthritis, Cancer (H), Cerebral infarction (H), Congestive heart failure (H), COPD (chronic obstructive pulmonary disease) (H), Depressive disorder, Diabetes (H), Heart disease, History of blood transfusion, Hypertension, or Uncomplicated asthma.    Social History:  Reviewed. No changes to social history except as noted above in HPI.    Vital Signs:   None. This is phone/video visit.     Labs:  Most recent laboratory results reviewed and no new labs.     Review of Systems:  10 systems (general, cardiovascular, respiratory, eyes, ENT, endocrine, GI, , M/S, neurological) were reviewed. Most pertinent finding(s) is/are:  denies fever, cough, persistent headaches, shortness of breath, chest pain, severe GI symptoms, trouble urinating, severe pain. The remaining systems are all unremarkable.    Mental Status Examination (limited as this is by phone/video):  Appearance: Awake, alert, appears stated age, no acute distress,  well-groomed   Attitude:  cooperative, pleasant   Motor: No gross abnormalities observed via video, not formally tested   Oriented to:  person, place, time, and situation  Attention Span and Concentration:  normal  Speech:  clear, coherent, regular rate, rhythm, and volume  Language: intact  Mood: up and down  Affect:  appropriate and in normal range and mood congruent  Associations:  no loose associations  Thought Process:  logical, linear and goal oriented  Thought Content:  no evidence of suicidal ideation or homicidal ideation, no evidence of psychotic thought, no auditory hallucinations present and no visual hallucinations present  Recent and Remote Memory:  Intact to interview. Not formally assessed. No amnesia.  Fund of Knowledge: appropriate  Insight:  good  Judgment:  intact, adequate for safety  Impulse Control:  intact    Suicide Risk Assessment:  Today Brittany Rooney reports no suicidal ideation. Based on all available evidence including the factors cited above, Brittany Rooney does not appear to be at imminent risk for self-harm, does not meet criteria for a 72-hr hold, and therefore remains appropriate for ongoing outpatient level of care.  A thorough assessment of risk factors related to suicide and self-harm have been reviewed and are noted above. The patient convincingly denies suicidality on several occasions. Local community safety resources reviewed for patient to use if needed. There was no deceit detected, and the patient presented in a manner that was believable.     DSM5 Diagnosis:  Attention-Deficit/Hyperactivity Disorder  314.01 (F90.9) Unspecified Attention -Deficit / Hyperactivity Disorder  Mood Disorder, Unspecified  Anxiety, Unspecified  R/O Borderline Personality Disorder    Medical comorbidities include:   Patient Active Problem List    Diagnosis Date Noted    ASCUS of cervix with negative high risk HPV 01/22/2019     Priority: Medium     2013, 2016 NIL paps  1/22/19 ASCUS  pap, neg HPV. Plan: cotest in 3 years.  7/10/23 LSIL, +HR HPV (not 16/18). Plan: cotest in 1 year due 07/10/24  07/17/23 Left msg and [facility] result note sent. Pt notified via [employer]  6/25/24 Reminder [email or web address]  7/23/24 Reminder call -- left message  8/21/24 Lost to follow-up for pap tracking           Eczema of both hands 12/22/2016     Priority: Medium    Seborrheic keratosis 11/17/2015     Priority: Medium    Vitamin D deficiency disease 02/25/2013     Priority: Medium    Contact dermatitis 02/19/2013     Priority: Medium    Obesity 02/19/2013     Priority: Medium    Adjustment disorder with anxiety 06/11/2011     Priority: Medium    ADD (attention deficit disorder) 06/05/2009     Priority: Medium    Hyperlipidemia LDL goal <130 12/03/2008     Priority: Medium    Hypothyroidism 11/25/2008     Priority: Medium    Malaise and fatigue      Priority: Medium     Problem list name updated by automated process. Provider to review         Psychosocial & Contextual Factors: see HPI above    Assessment:  From Intake, 8/11/2023:  Brittany Rooney is a 56 yo with past psychiatric hx including depression, anxiety, and ADHD who presents today for psychiatric evaluation. Pt with long hx of mental health struggles. Struggling significantly with mood regulation lately and has questioned possible mood disorder vs borderline personality disorder (BPD). Pt with many features of BPD after discussion of criteria and review of her hx and current struggles. Pt will read more about the dx. Pt agreeable to start a trial with lamotrigine to see if helpful for mood stabilization. Also recommend DBT and resources provided today. If lamotrigine really helpful, could consider tapering off fluoxetine. Passive thoughts of death/suicide but no plan or intent to harm self. Denies current problematic drug or alcohol use. Uses cannabis but stopped using alcohol about 4-6 weeks ago.      9/22/2023:  Discussed patient may benefit from Vyvanse trial  over Adderall XR.  We will start trial with Vyvanse 40 mg daily in place of Adderall XR dose to see if more effective and better tolerated.  Could consider continued titration of lamotrigine at next visit.  Strongly recommend therapy/consideration for DBT.  Resources previously sent for DBT.  No acute safety concerns.  No SI.  No problematic drug or alcohol use.    2/9/2024:  Patient overall appreciating effects of lamotrigine.  Patient would like to continue to optimize lamotrigine since it does help stabilize mood and irritability some.  No negative side effects.  Patient will continue at 100 mg daily for the next 2 weeks and then increase to 150 mg for 2 weeks then 200 mg daily.  Patient also feeling like Vyvanse 40 mg has had some waning effects.  No chest pains, racing heart, or tics.  We will increase to 50 mg daily to see if more helpful and if effects last a little bit longer than lunchtime.  Discussed needing blood pressure readings, especially with increased dose of Vyvanse.  No acute safety concerns.  No SI.  No problematic drug or alcohol use.    7/10/2023:  Patient overall struggling, particularly with ADHD symptoms.  We discussed alternative pharmacies such as [place] [facility] [place] and mail order pharmacy.  Patient prefers to switch back to Adderall to see if her local pharmacy can obtain the medication.  Due to some decreased effects of lamotrigine, we will increase up to 300 mg daily.  Patient encouraged to still watch for any rashes.  No acute safety concerns.  No SI.  No problematic drug or alcohol use.    10/18/2024:  Patient struggling a bit more off fluoxetine.  Has continued on lamotrigine.  Has also struggled to consistently obtain Adderall.  We will go ahead and try a different pharmacy for Adderall.  We will also send fluoxetine to mail order pharmacy to see if patient can get more consistent with medications.  No acute safety concerns.  No SI.  No problematic drug or alcohol  use.    Medication side effects and alternatives were reviewed. Health promotion activities recommended and reviewed today. All questions addressed. Education and counseling completed regarding risks and benefits of medications and psychotherapy options. Recommend therapy for additional support.     Treatment Plan:  Continue Adderall IR 10 mg IR daily as needed for ADHD.  Continue Adderall XR at 30 mg daily as needed for ADHD.  Re-start/Continue fluoxetine 40 mg daily for mood and anxiety (ok to take every other day for 1-2 weeks when restarting)  Continue lamotrigine 150 mg twice daily, or 300 mg once daily for mood.  Continue to monitor for any rashes.  Continue all other cares per primary care provider.   Continue all other medications as reviewed per electronic medical record today.   Safety plan reviewed. To the Emergency Department as needed or call after hours crisis line at [phone] or [phone]. [place] Crisis Text Line. Text [place] to 119480 or Suicide LifeLine Chat: suicidepreventionChunnel.TVline.org/chat  Consider psychotherapy as nonpharmacotherapy option. Strongly consider DBT.   Schedule an appointment with me in 4-6 weeks or sooner as needed. Call [facility] at [phone] to schedule.  Follow up with primary care provider as planned or for acute medical concerns.  Call the psychiatric nurse line with medication questions or concerns at [phone].  MyChart may be used to communicate with your provider, but this is not intended to be used for emergencies.    Have previously discussed risks of stimulant medication including, but not limited to, decreased appetite, risk of tics (and that they may be lasting), trouble sleeping, cardiac risks such as increased heart rate and blood pressure, and rare risk of sudden cardiac death.  Also risk of addiction/tolerance/dependence.  Also have discussed newer evidence that came out November 2023 showing up to 23% increased risk for  cardiovascular diseases-particularly high blood pressure and arterial sclerosis/hardening of the arteries.    Administrative Billing:   Phone Call/Video Duration: 21 Minutes  Start: 10:30a  Stop: 10:51a    The longitudinal plan of care for the diagnosis(es)/condition(s) as documented were addressed during this visit. Due to the added complexity in care, I will continue to support Veronica in the subsequent management and with ongoing continuity of care.    Episode of Care #: 5    Patient Status:  Patient will continue to be seen for ongoing consultation and stabilization.    Signed:   Jossy Calle DO  [facility]    Disclaimer: This note consists of symbols derived from keyboarding, dictation and/or voice recognition software. As a result, there may be errors in the script that have gone undetected. Please consider this when interpreting information found in this chart.

## 2024-10-28 ENCOUNTER — TELEPHONE (OUTPATIENT)
Dept: FAMILY MEDICINE | Facility: CLINIC | Age: 59
End: 2024-10-28
Payer: COMMERCIAL

## 2024-11-07 ENCOUNTER — MYC REFILL (OUTPATIENT)
Dept: PSYCHIATRY | Facility: CLINIC | Age: 59
End: 2024-11-07
Payer: COMMERCIAL

## 2024-11-07 DIAGNOSIS — F39 MOOD DISORDER (H): ICD-10-CM

## 2024-11-07 DIAGNOSIS — F41.9 ANXIETY: ICD-10-CM

## 2024-11-08 RX ORDER — FLUOXETINE 40 MG/1
40 CAPSULE ORAL DAILY
Qty: 90 CAPSULE | Refills: 1 | OUTPATIENT
Start: 2024-11-08

## 2024-11-08 RX ORDER — LAMOTRIGINE 150 MG/1
300 TABLET ORAL DAILY
Qty: 180 TABLET | Refills: 1 | OUTPATIENT
Start: 2024-11-08

## 2024-11-08 NOTE — TELEPHONE ENCOUNTER
Patient requesting a refill of Lamotrigine 150 mg and fluoxetine 40 mg. These were both ordered on 10/18/24 for 90 day refills with 1 refill.     Will refuse refills and notify patient.     Fanta Salvador RN on 11/8/2024 at 12:07 PM

## 2024-11-12 NOTE — TELEPHONE ENCOUNTER
Writer spoke with [facility] who reported they have the following prescriptions profiled as they were too soon to fill when sent to the pharmacy on 10/18/2024:  FLUoxetine (PROZAC) 40 MG capsule 90 capsule 1 10/18/2024 -- No   Sig - Route: Take 1 capsule (40 mg) by mouth daily. - Oral   Sent to pharmacy as: FLUoxetine HCl 40 MG Oral Capsule (PROzac)   Class: E-Prescribe   Order: [id number]   E-Prescribing Status: Receipt confirmed by pharmacy (10/18/2024  1:17 PM CDT)     lamoTRIgine (LAMICTAL) 150 MG tablet 180 tablet 1 10/18/2024 -- No   Sig - Route: Take 2 tablets (300 mg) by mouth daily. - Oral   Sent to pharmacy as: lamoTRIgine 150 MG Oral Tablet (LaMICtal)   Class: E-Prescribe   Order: [id number]   E-Prescribing Status: Receipt confirmed by pharmacy (10/18/2024  1:17 PM CDT)   [facility] [place], [place]. Writer requested [facility] cancel the prescription and routing new pharmacy request to Dr. Calle to further assist.     Cary Corado RN on 11/12/2024 at 3:04 PM

## 2024-11-13 RX ORDER — FLUOXETINE 40 MG/1
40 CAPSULE ORAL DAILY
Qty: 90 CAPSULE | Refills: 1 | Status: SHIPPED | OUTPATIENT
Start: 2024-11-13

## 2024-11-13 RX ORDER — LAMOTRIGINE 150 MG/1
300 TABLET ORAL DAILY
Qty: 180 TABLET | Refills: 1 | Status: SHIPPED | OUTPATIENT
Start: 2024-11-13

## 2024-11-24 ENCOUNTER — MYC REFILL (OUTPATIENT)
Dept: PSYCHIATRY | Facility: CLINIC | Age: 59
End: 2024-11-24
Payer: COMMERCIAL

## 2024-11-24 ENCOUNTER — MYC REFILL (OUTPATIENT)
Dept: FAMILY MEDICINE | Facility: CLINIC | Age: 59
End: 2024-11-24
Payer: COMMERCIAL

## 2024-11-24 DIAGNOSIS — L30.9 ECZEMA OF BOTH HANDS: ICD-10-CM

## 2024-11-24 DIAGNOSIS — F90.9 ATTENTION DEFICIT HYPERACTIVITY DISORDER (ADHD), UNSPECIFIED ADHD TYPE: ICD-10-CM

## 2024-11-25 RX ORDER — CLOBETASOL PROPIONATE 0.5 MG/G
OINTMENT TOPICAL 2 TIMES DAILY
Qty: 15 G | Refills: 0 | Status: SHIPPED | OUTPATIENT
Start: 2024-11-25

## 2024-11-25 RX ORDER — DEXTROAMPHETAMINE SACCHARATE, AMPHETAMINE ASPARTATE, DEXTROAMPHETAMINE SULFATE AND AMPHETAMINE SULFATE 2.5; 2.5; 2.5; 2.5 MG/1; MG/1; MG/1; MG/1
10 TABLET ORAL DAILY
Qty: 30 TABLET | Refills: 0 | Status: SHIPPED | OUTPATIENT
Start: 2024-11-25

## 2024-11-25 RX ORDER — DEXTROAMPHETAMINE SACCHARATE, AMPHETAMINE ASPARTATE MONOHYDRATE, DEXTROAMPHETAMINE SULFATE AND AMPHETAMINE SULFATE 7.5; 7.5; 7.5; 7.5 MG/1; MG/1; MG/1; MG/1
30 CAPSULE, EXTENDED RELEASE ORAL DAILY
Qty: 30 CAPSULE | Refills: 0 | Status: SHIPPED | OUTPATIENT
Start: 2024-11-25

## 2024-11-25 NOTE — TELEPHONE ENCOUNTER
Provider Dr. Calle  indicated in this patients last visit note that a future/return appointment was indicated .    [facility] please call this patient to schedule a future appointment with Dr. Calle    Contact Information  [phone] (Mobile)   [phone] (Home Phone)  [phone] (Work Phone)       Fam Grace RN on 11/25/2024 at 1:19 PM

## 2024-11-25 NOTE — TELEPHONE ENCOUNTER
Date of Last Office Visit: 10/18/24  Date of Next Office Visit: None; routing for [facility] to assist pt with scheduling.    No shows since last visit: No  More than one patient-initiated cancellation (with reschedule) since last seen in clinic? No    []Medication refilled per  Medication Refill in Ambulatory Care  policy.  [x]Medication unable to be refilled by RN due to criteria not met as indicated below:    []Eligibility: has not had a provider visit within last 6 months   []Supervision: no future appointment; < 7 days before next appointment   []Compliance: no shows; cancellations; lapse in therapy   []Verification: order discrepancy; may need modification...   [] > 30-day supply request   []Advanced refill request: > 7 days before refill date   [x]Controlled medication   []Medication not included in policy   []Review: new med; med adjusted <= 30 days; safety alert; requires lab monitoring...   []Scope of Practice: refill request processed by LPN/MA   []Other:      Medication(s) requested:     -  amphetamine-dextroamphetamine (ADDERALL XR) 30 MG 24 hr capsule   Date last ordered: 10/18/24  Qty: 30  Refills: 0  Appropriate for refill? Provider to review.      -  amphetamine-dextroamphetamine (ADDERALL) 10 MG tablet   Date last ordered: 10/18/24  Qty: 30  Refills: 0  Appropriate for refill? Provider to review.        Any Controlled Substance(s)? Yes   [place]  checked? N/A      Requested medication(s) verified as identical to current order? Yes    Any lapse in adherence to medication(s) greater than 5 days? No      Additional action taken? routed encounter to provider for review.      Last visit treatment plan:   Treatment Plan:  Continue Adderall IR 10 mg IR daily as needed for ADHD.  Continue Adderall XR at 30 mg daily as needed for ADHD.  Re-start/Continue fluoxetine 40 mg daily for mood and anxiety (ok to take every other day for 1-2 weeks when restarting)  Continue lamotrigine 150 mg twice daily, or 300 mg once  daily for mood.  Continue to monitor for any rashes.  Continue all other cares per primary care provider.   Continue all other medications as reviewed per electronic medical record today.   Safety plan reviewed. To the Emergency Department as needed or call after hours crisis line at [phone] or [phone]. [place] Crisis Text Line. Text [place] to 503347 or Suicide LifeLine Chat: suicidepreventionFlyfitline.org/chat  Consider psychotherapy as nonpharmacotherapy option. Strongly consider DBT.   Schedule an appointment with me in 4-6 weeks or sooner as needed. Call [facility] at [phone] to schedule.  Follow up with primary care provider as planned or for acute medical concerns.  Call the psychiatric nurse line with medication questions or concerns at [phone].  MyChart may be used to communicate with your     Any medication(s) require lab monitoring? No    Fam Grace RN on 11/25/2024 at 1:17 PM

## 2024-12-21 ENCOUNTER — HEALTH MAINTENANCE LETTER (OUTPATIENT)
Age: 59
End: 2024-12-21

## 2025-01-07 ENCOUNTER — MYC REFILL (OUTPATIENT)
Dept: PSYCHIATRY | Facility: CLINIC | Age: 60
End: 2025-01-07
Payer: COMMERCIAL

## 2025-01-07 ENCOUNTER — MYC REFILL (OUTPATIENT)
Dept: FAMILY MEDICINE | Facility: CLINIC | Age: 60
End: 2025-01-07
Payer: COMMERCIAL

## 2025-01-07 DIAGNOSIS — E03.4 HYPOTHYROIDISM DUE TO ACQUIRED ATROPHY OF THYROID: ICD-10-CM

## 2025-01-07 DIAGNOSIS — F90.9 ATTENTION DEFICIT HYPERACTIVITY DISORDER (ADHD), UNSPECIFIED ADHD TYPE: ICD-10-CM

## 2025-01-07 RX ORDER — DEXTROAMPHETAMINE SACCHARATE, AMPHETAMINE ASPARTATE, DEXTROAMPHETAMINE SULFATE AND AMPHETAMINE SULFATE 2.5; 2.5; 2.5; 2.5 MG/1; MG/1; MG/1; MG/1
10 TABLET ORAL DAILY
Qty: 30 TABLET | Refills: 0 | Status: SHIPPED | OUTPATIENT
Start: 2025-01-07

## 2025-01-07 RX ORDER — LEVOTHYROXINE SODIUM 125 UG/1
125 TABLET ORAL DAILY
Qty: 90 TABLET | Refills: 3 | Status: CANCELLED | OUTPATIENT
Start: 2025-01-07

## 2025-01-07 RX ORDER — DEXTROAMPHETAMINE SACCHARATE, AMPHETAMINE ASPARTATE MONOHYDRATE, DEXTROAMPHETAMINE SULFATE AND AMPHETAMINE SULFATE 7.5; 7.5; 7.5; 7.5 MG/1; MG/1; MG/1; MG/1
30 CAPSULE, EXTENDED RELEASE ORAL DAILY
Qty: 30 CAPSULE | Refills: 0 | Status: SHIPPED | OUTPATIENT
Start: 2025-01-07

## 2025-01-07 NOTE — TELEPHONE ENCOUNTER
Date of Last Office Visit: 10/18/24  Date of Next Office Visit:  1/21/25  No shows since last visit: No  More than one patient-initiated cancellation (with reschedule) since last seen in clinic? No    []Medication refilled per  Medication Refill in Ambulatory Care  policy.  [x]Medication unable to be refilled by RN due to criteria not met as indicated below:    []Eligibility: has not had a provider visit within last 6 months   []Supervision: no future appointment; < 7 days before next appointment   []Compliance: no shows; cancellations; lapse in therapy   []Verification: order discrepancy; may need modification...   [] > 30-day supply request   []Advanced refill request: > 7 days before refill date   [x]Controlled medication   []Medication not included in policy   []Review: new med; med adjusted <= 30 days; safety alert; requires lab monitoring...   []Scope of Practice: refill request processed by LPN/MA   []Other:      Medication(s) requested:     -  amphetamine-dextroamphetamine (ADDERALL) 10 MG tablet   Date last ordered: 11/25/24  Qty: 30  Refills: 0      -  amphetamine-dextroamphetamine (ADDERALL XR) 30 MG 24 hr capsule   Date last ordered: 11/25/24  Qty: 30  Refills: 0        Any Controlled Substance(s)? [place]   [place] N[place]      Requested medication(s) verified as identical to current order? Yes    Any lapse in adherence to medication(s) greater than 5 days? N/A     Additional action taken? routed encounter to provider for review.      Last visit treatment plan:     Treatment Plan:  Continue Adderall IR 10 mg IR daily as needed for ADHD.  Continue Adderall XR at 30 mg daily as needed for ADHD.  Re-start/Continue fluoxetine 40 mg daily for mood and anxiety (ok to take every other day for 1-2 weeks when restarting)  Continue lamotrigine 150 mg twice daily, or 300 mg once daily for mood.  Continue to monitor for any rashes.  Continue all other cares per primary care provider.   Continue all other  medications as reviewed per electronic medical record today.   Safety plan reviewed. To the Emergency Department as needed or call after hours crisis line at [phone] or [phone]. [place] Crisis Text Line. Text [place] to 199989 or Suicide LifeLine Chat: suicidepreventionlifeline.org/chat  Consider psychotherapy as nonpharmacotherapy option. Strongly consider DBT.   Schedule an appointment with me in 4-6 weeks or sooner as needed    Any medication(s) require lab monitoring? No

## 2025-03-19 NOTE — PROGRESS NOTES
[facility]  3/20/25      Behavioral Health Clinician Progress Note    Patient Name: Brittany Rooney           Service Type:  Individual      Service Location:   [facility] / Email (patient reached)     Session Start Time: 330pm  Session End Time: 355pm      Session Length: 16 - 37      Attendees: Client     Service Modality:  Video Visit:      Provider verified identity through the following two step process.  Patient provided:  Patient is known previously to provider    Telemedicine Visit: The patient's condition can be safely assessed and treated via synchronous audio and visual telemedicine encounter.      Reason for Telemedicine Visit: Services only offered telehealth    Originating Site (Patient Location): Patient's other parked car in [place]    Distant Site (Provider Location): Provider Remote Setting- Home Office    Consent:  The patient/guardian has verbally consented to: the potential risks and benefits of telemedicine (video visit) versus in person care; bill my insurance or make self-payment for services provided; and responsibility for payment of non-covered services.     Patient would like the video invitation sent by:  My Chart    Mode of Communication:  Video Conference via [facility]    Distant Location (Provider):  Off-site    As the provider I attest to compliance with applicable laws and regulations related to telemedicine.    Visit Activities (Refresh list every visit): [facility] Only    Diagnostic Assessment Date: 10/24/23 NADYA Hinojosa [facility]  Treatment Plan Review Date: 3/20/25  See Flowsheets for today's PHQ-9 and GALE-7 results  Previous PHQ-9:       10/24/2023     3:18 PM 2/9/2024     7:28 AM 10/18/2024     9:32 AM   PHQ-9 SCORE   [facility] 14 (Moderate depression) 12 (Moderate depression) 11 (Moderate depression)   PHQ-9 Total Score 14 12    12 11    11     Previous GALE-7:       7/10/2024     9:10 AM 10/18/2024     9:32 AM  3/20/2025    12:56 PM   GALE-7 SCORE   Total Score 18 (severe anxiety) 15 (severe anxiety) 12 (moderate anxiety)   Total Score 18    18 15    15 12        Patient-reported       DATA  Extended Session (60+ minutes): No  Interactive Complexity: No  Crisis: No  [facility] Patient: No    Treatment Objective(s) Addressed in This Session:  Target Behavior(s): disease management/lifestyle changes reducing ADHD sx; stabilizing mood    Depressed Mood: Increase interest, engagement, and pleasure in doing things  Decrease frequency and intensity of feeling down, depressed, hopeless  Improve concentration, focus, and mindfulness in daily activities   Anxiety: will experience a reduction in anxiety and will develop more effective coping skills to manage anxiety symptoms    Current Stressors / Issues:  MH update:  Much better.  Consistently been on meds.  Recognized how important this was.  Louder? Not new intrusive thoughts bizarre things- thoughts to shop lift, eating a lot/more sugar, driving into car (back into October), increased in money spending.  Mood is good.  Denies concerns of [place]. Feeling hopeful.   Stresses:  feeling better with spring  Appetite: n/a  Sleep: no concern  Outpatient Provider updates: Denies  SI/SIB/HI:  Denies current.  one previous attempt ETOH/cut wrists in 2021.  GOLDY:  n/a  Side effects/compliance:  Interventions:  [facility] engaged in sx and goal exploration.  Sent info on Mindfulness.    Most important:  Not sure that she wants to change meds.  Mood feels really stable.    10/18  MH update:  Been out of Prozac for a few weeks.  Been having a tough time with Adderall as well.  Depression and anxiety sx higher.  ADHD sx higher.  Impulsivity higher.  Racing thoughts.   Emotional.  Engaging in compulsive cleaning behaviors.   Stresses:  Son having some MH/CD concerns.  Brother is in care home.   Work stress.  Contentious with partner   Appetite: n/a  Sleep: n/a  Outpatient Provider updates: Denies.  SI/SIB/HI:   Suicidal ideation without plan or intent.  Safety plan on file.  one previous attempt ETOH/cut wrists in 2021.  GOLDY:   unknown  Side effects/compliance:  Interventions:  [facility] discussed high interpersonal dynamics   Most important:  Sx higher.  Can't get meds filled often been out of Prozac.     7/10   update:  White knuckling life without ADHD meds.  Can't ever Vyvanse.  CAN get 10mg of Adderall.  Can't focus, concentrate, low motivation.  Has had July off, goes back to work in August.  Anxiety is tough at times.  Mood is more down, feeling more on edge, feeling very obsessive/compulsive.  Doom scrolling.   Stresses: interpersonal distress, work-high school sports  Appetite: N/a  Sleep: N/a  Outpatient Provider updates: [facility].  SI/SIB/HI: Denies current.  one previous attempt ETOH/cut wrists in 2021.  GOLDY:  on going ETOH absence.  THC occasionally.  Again newly, smoking nicotine.  Side effects/compliance: N/a  Interventions:  [facility] engaged in discussion about ADHD sx and interventions.  Discussed figits including chewies  Most important:  Can't ever get ADHD meds.  Lamictal very helpful but less so since last appt.  Recognizing some ASD sx.    2/9   update:  Feels like Lamictal has been helpful has been out for 5 days.  Less internal agitation.  Likely less depressive sx.  Also out of Vyvanse 2 days.  Taking short term Adderall today.  Anxiety has been high.  Picking at things/restless.  Denies panic  Stresses: interpersonal distress, work-high school sports  Appetite: N/a  Sleep: Poor variable, recently increased  Outpatient Provider updates: [facility] NADYA Hinojosa, previously recommended DBT  SI/SIB/HI:  Intermittent hx of passive ideation, no plan/intent, one noted previous attempt ETOH/cut wrists in 2021.  GOLDY: Recent ETOH abstance after increased concerns.  THC occasionally  Side effects/compliance: as above  Interventions:  [facility] discussed ADHD interventions/time mgmt  Most important:  Get back on  meds, titrate back on Lamictal?  Wonders about increasing Vyvanse? And or adding Adderall short term    Progress on Treatment Objective(s) / Homework:  Satisfactory progress - PREPARATION (Decided to change - considering how); Intervened by negotiating a change plan and determining options / strategies for behavior change, identifying triggers, exploring social supports, and working towards setting a date to begin behavior change    Motivational Interviewing    MI Intervention: Co-Developed Goal: reducing ADHD sx, increasing mood stabilization, Expressed Empathy/Understanding, and Open-ended questions     Change Talk Expressed by the Patient: Desire to change Reasons to change    Provider Response to Change Talk: E - Evoked more info from patient about behavior change and A - Affirmed patient's thoughts, decisions, or attempts at behavior change    Also provided psychoeducation about behavioral health condition, symptoms, and treatment options    Assessments completed prior to visit:  The following assessments were completed by patient for this visit:  Nn/a    Care Plan review completed: Yes    Medication Review:  Changes to psychiatric medications, see updated Medication List in EPIC.     Medication Compliance:  Yes    Changes in Health Issues:   None reported    Chemical Use Review:   Substance Use: Chemical use reviewed, no active concerns identified      Tobacco Use: No change in amount of tobacco use since last session.  Patient declined discussion at this time    Assessment: Current Emotional / Mental Status (status of significant symptoms):  Risk status (Self / Other harm or suicidal ideation)  Patient has had a history of suicidal ideation: hx of passive ideation noted previously and suicide attempts: previous noted attempted in 2021 ETOH/cut wrists  Patient denies current fears or concerns for personal safety.  Patient denies current or recent suicidal ideation or behaviors.  Patient denies current or  recent homicidal ideation or behaviors.  Patient denies current or recent self injurious behavior or ideation.  Patient denies other safety concerns.  A safety and risk management plan has not been developed at this time, however patient was encouraged to call [place] [phone] should there be a change in any of these risk factors.    Appearance:   Appropriate   Eye Contact:   Good   Psychomotor Behavior: Normal   Attitude:   Cooperative   Orientation:   All  Speech   Rate / Production: Normal    Volume:  Normal   Mood:    Anxious  Depressed    Affect:    Appropriate   Thought Content:  Clear   Thought Form:  Coherent  Logical   Insight:    Good     Diagnoses:  1. Generalized anxiety disorder    2. Attention deficit hyperactivity disorder (ADHD), unspecified ADHD type    3. Mood disorder        Collateral Reports Completed:  Communicated with: Dr Calle    Plan: (Homework, other):  Patient was given information about behavioral services and encouraged to schedule a follow up appointment with the clinic [facility] as needed.  She was also given information about mental health symptoms and treatment options .  CD Recommendations: No indications of CD issues.       Milagro Melgar, [facility]    ______________________________________________________________________    Integrated Primary Care Behavioral Health Treatment Plan    Patient's Name: Brittany Rooney  YOB: 1965    Date of Creation: 2/9/24  Date Treatment Plan Last Reviewed/Revised: 3/20/25    DSM5 Diagnoses:   1. Generalized anxiety disorder    2. Attention deficit hyperactivity disorder (ADHD), unspecified ADHD type    3. Mood disorder (H24)      Psychosocial / Contextual Factors: interpersonal distress  PROMIS (reviewed every 90 days):     The following assessments were completed by patient for this visit:  PROMIS 10-Global Health (only subscores and total score):       8/11/2023     1:59 PM 10/9/2023     9:56 AM 10/24/2023     3:19 PM 2/9/2024     7:29  AM 7/10/2024     9:11 AM 10/18/2024     9:33 AM 3/20/2025    12:57 PM   PROMIS-10 Scores Only   Global Mental Health Score 6 5 7 9    9 7    7 7    7 10    Global Physical Health Score 15 15 16 16    16 16    16 15    15 16    PROMIS TOTAL - SUBSCORES 21 20 23 25    25 23    23 22    22 26        Patient-reported       Referral / Collaboration:  Referral to another professional/service is not indicated at this time..    Anticipated number of session for this episode of care: 5-6  Anticipation frequency of session: Every other week  Anticipated Duration of each session: 16-37 minutes  Treatment plan will be reviewed in 90 days or when goals have been changed.       MeasurableTreatment Goal(s) related to diagnosis / functional impairment(s)  Goal 1: Patient will work on reducing anxiety via establishing boundaries    I will know I've met my goal when I have boundaries with family and friends.      Objective #A (Patient Action)    Patient will compile a list of boundaries that they would like to set with others. . .  Status: Continued - Date(s):10/18/24 , 3/20/25    Intervention(s)  Therapist will provide support through CBT, MI, Acceptance and Commitment Therapy, Dialectic Behavioral Therapy and problem solving model to explore and overcome barriers.    Goal 2: Patient will manage concerns of safety    I will know I've met my goal when I can reduce suicidal ideation .      Objective #A (Patient Action)    Patient will use previously developed safety plan on file.  Status: Cont - Date: 3/20/25     Intervention(s)  Therapist will provide support through CBT, MI, Acceptance and Commitment Therapy, Dialectic Behavioral Therapy and problem solving model to explore and overcome barriers.      Patient has reviewed and agreed to the above plan.      Milagro Melgar [facility]

## 2025-03-20 ENCOUNTER — VIRTUAL VISIT (OUTPATIENT)
Dept: BEHAVIORAL HEALTH | Facility: CLINIC | Age: 60
End: 2025-03-20
Payer: COMMERCIAL

## 2025-03-20 ENCOUNTER — VIRTUAL VISIT (OUTPATIENT)
Dept: PSYCHIATRY | Facility: CLINIC | Age: 60
End: 2025-03-20
Payer: COMMERCIAL

## 2025-03-20 DIAGNOSIS — F90.9 ATTENTION DEFICIT HYPERACTIVITY DISORDER (ADHD), UNSPECIFIED ADHD TYPE: ICD-10-CM

## 2025-03-20 DIAGNOSIS — F39 MOOD DISORDER: ICD-10-CM

## 2025-03-20 DIAGNOSIS — F41.1 GENERALIZED ANXIETY DISORDER: Primary | ICD-10-CM

## 2025-03-20 DIAGNOSIS — F41.9 ANXIETY: ICD-10-CM

## 2025-03-20 DIAGNOSIS — F90.9 ATTENTION DEFICIT HYPERACTIVITY DISORDER (ADHD), UNSPECIFIED ADHD TYPE: Primary | ICD-10-CM

## 2025-03-20 RX ORDER — DEXTROAMPHETAMINE SACCHARATE, AMPHETAMINE ASPARTATE MONOHYDRATE, DEXTROAMPHETAMINE SULFATE AND AMPHETAMINE SULFATE 7.5; 7.5; 7.5; 7.5 MG/1; MG/1; MG/1; MG/1
30 CAPSULE, EXTENDED RELEASE ORAL DAILY
Qty: 30 CAPSULE | Refills: 0 | Status: CANCELLED | OUTPATIENT
Start: 2025-03-20

## 2025-03-20 RX ORDER — DEXTROAMPHETAMINE SACCHARATE, AMPHETAMINE ASPARTATE, DEXTROAMPHETAMINE SULFATE AND AMPHETAMINE SULFATE 2.5; 2.5; 2.5; 2.5 MG/1; MG/1; MG/1; MG/1
10 TABLET ORAL DAILY
Qty: 30 TABLET | Refills: 0 | Status: SHIPPED | OUTPATIENT
Start: 2025-04-19 | End: 2025-05-19

## 2025-03-20 RX ORDER — DEXTROAMPHETAMINE SACCHARATE, AMPHETAMINE ASPARTATE MONOHYDRATE, DEXTROAMPHETAMINE SULFATE AND AMPHETAMINE SULFATE 7.5; 7.5; 7.5; 7.5 MG/1; MG/1; MG/1; MG/1
30 CAPSULE, EXTENDED RELEASE ORAL DAILY
Qty: 30 CAPSULE | Refills: 0 | Status: SHIPPED | OUTPATIENT
Start: 2025-05-19 | End: 2025-06-18

## 2025-03-20 RX ORDER — DEXTROAMPHETAMINE SACCHARATE, AMPHETAMINE ASPARTATE MONOHYDRATE, DEXTROAMPHETAMINE SULFATE AND AMPHETAMINE SULFATE 7.5; 7.5; 7.5; 7.5 MG/1; MG/1; MG/1; MG/1
30 CAPSULE, EXTENDED RELEASE ORAL DAILY
Qty: 30 CAPSULE | Refills: 0 | Status: SHIPPED | OUTPATIENT
Start: 2025-04-19 | End: 2025-05-19

## 2025-03-20 RX ORDER — DEXTROAMPHETAMINE SACCHARATE, AMPHETAMINE ASPARTATE, DEXTROAMPHETAMINE SULFATE AND AMPHETAMINE SULFATE 2.5; 2.5; 2.5; 2.5 MG/1; MG/1; MG/1; MG/1
10 TABLET ORAL DAILY
Qty: 30 TABLET | Refills: 0 | Status: SHIPPED | OUTPATIENT
Start: 2025-05-19 | End: 2025-06-18

## 2025-03-20 RX ORDER — DEXTROAMPHETAMINE SACCHARATE, AMPHETAMINE ASPARTATE, DEXTROAMPHETAMINE SULFATE AND AMPHETAMINE SULFATE 2.5; 2.5; 2.5; 2.5 MG/1; MG/1; MG/1; MG/1
10 TABLET ORAL DAILY
Qty: 30 TABLET | Refills: 0 | Status: SHIPPED | OUTPATIENT
Start: 2025-03-20 | End: 2025-04-19

## 2025-03-20 RX ORDER — DEXTROAMPHETAMINE SACCHARATE, AMPHETAMINE ASPARTATE, DEXTROAMPHETAMINE SULFATE AND AMPHETAMINE SULFATE 2.5; 2.5; 2.5; 2.5 MG/1; MG/1; MG/1; MG/1
10 TABLET ORAL DAILY
Qty: 30 TABLET | Refills: 0 | Status: CANCELLED | OUTPATIENT
Start: 2025-03-20

## 2025-03-20 RX ORDER — DEXTROAMPHETAMINE SACCHARATE, AMPHETAMINE ASPARTATE MONOHYDRATE, DEXTROAMPHETAMINE SULFATE AND AMPHETAMINE SULFATE 7.5; 7.5; 7.5; 7.5 MG/1; MG/1; MG/1; MG/1
30 CAPSULE, EXTENDED RELEASE ORAL DAILY
Qty: 30 CAPSULE | Refills: 0 | Status: SHIPPED | OUTPATIENT
Start: 2025-03-20 | End: 2025-04-19

## 2025-03-20 ASSESSMENT — ANXIETY QUESTIONNAIRES
IF YOU CHECKED OFF ANY PROBLEMS ON THIS QUESTIONNAIRE, HOW DIFFICULT HAVE THESE PROBLEMS MADE IT FOR YOU TO DO YOUR WORK, TAKE CARE OF THINGS AT HOME, OR GET ALONG WITH OTHER PEOPLE: SOMEWHAT DIFFICULT
GAD7 TOTAL SCORE: 12
2. NOT BEING ABLE TO STOP OR CONTROL WORRYING: SEVERAL DAYS
GAD7 TOTAL SCORE: 12
GAD7 TOTAL SCORE: 12
4. TROUBLE RELAXING: MORE THAN HALF THE DAYS
7. FEELING AFRAID AS IF SOMETHING AWFUL MIGHT HAPPEN: SEVERAL DAYS
1. FEELING NERVOUS, ANXIOUS, OR ON EDGE: MORE THAN HALF THE DAYS
6. BECOMING EASILY ANNOYED OR IRRITABLE: MORE THAN HALF THE DAYS
GAD7 TOTAL SCORE: 12
1. FEELING NERVOUS, ANXIOUS, OR ON EDGE: MORE THAN HALF THE DAYS
5. BEING SO RESTLESS THAT IT IS HARD TO SIT STILL: MORE THAN HALF THE DAYS
2. NOT BEING ABLE TO STOP OR CONTROL WORRYING: SEVERAL DAYS
8. IF YOU CHECKED OFF ANY PROBLEMS, HOW DIFFICULT HAVE THESE MADE IT FOR YOU TO DO YOUR WORK, TAKE CARE OF THINGS AT HOME, OR GET ALONG WITH OTHER PEOPLE?: SOMEWHAT DIFFICULT
7. FEELING AFRAID AS IF SOMETHING AWFUL MIGHT HAPPEN: SEVERAL DAYS
IF YOU CHECKED OFF ANY PROBLEMS ON THIS QUESTIONNAIRE, HOW DIFFICULT HAVE THESE PROBLEMS MADE IT FOR YOU TO DO YOUR WORK, TAKE CARE OF THINGS AT HOME, OR GET ALONG WITH OTHER PEOPLE: SOMEWHAT DIFFICULT
8. IF YOU CHECKED OFF ANY PROBLEMS, HOW DIFFICULT HAVE THESE MADE IT FOR YOU TO DO YOUR WORK, TAKE CARE OF THINGS AT HOME, OR GET ALONG WITH OTHER PEOPLE?: SOMEWHAT DIFFICULT
3. WORRYING TOO MUCH ABOUT DIFFERENT THINGS: MORE THAN HALF THE DAYS
7. FEELING AFRAID AS IF SOMETHING AWFUL MIGHT HAPPEN: SEVERAL DAYS
GAD7 TOTAL SCORE: 12
3. WORRYING TOO MUCH ABOUT DIFFERENT THINGS: MORE THAN HALF THE DAYS
5. BEING SO RESTLESS THAT IT IS HARD TO SIT STILL: MORE THAN HALF THE DAYS
GAD7 TOTAL SCORE: 12
7. FEELING AFRAID AS IF SOMETHING AWFUL MIGHT HAPPEN: SEVERAL DAYS
6. BECOMING EASILY ANNOYED OR IRRITABLE: MORE THAN HALF THE DAYS
4. TROUBLE RELAXING: MORE THAN HALF THE DAYS

## 2025-03-20 NOTE — NURSING NOTE
Current patient location: [address] [place], [place] [address] [place]  [place] [address]    Is the patient currently in the state of [place]? YES    Visit mode: VIDEO    If the visit is dropped, the patient can be reconnected by:VIDEO VISIT: Text to cell phone:   Telephone Information:   Mobile [phone]       Will anyone else be joining the visit? NO  (If patient encounters technical issues they should call [phone])    Are changes needed to the allergy or medication list? Pt stated no changes to allergies and Pt stated no med changes    Are refills needed on medications prescribed by this physician? YES    Rooming Documentation:  Questionnaire(s) completed    Reason for visit: RECHLEOLA HUANG

## 2025-03-20 NOTE — PATIENT INSTRUCTIONS
Treatment Plan:  Continue Adderall IR 10 mg IR daily as needed for ADHD.  Continue Adderall XR at 30 mg daily as needed for ADHD.  Continue fluoxetine 40 mg daily for mood and anxiety.  Continue lamotrigine 150 mg twice daily, or 300 mg once daily for mood.    Continue all other cares per primary care provider.   Continue all other medications as reviewed per electronic medical record today.   Safety plan reviewed. To the Emergency Department as needed or call after hours crisis line at [phone] or [phone]. [place] Crisis Text Line. Text [place] to 692758 or Suicide LifeLine Chat: suicidepreventionlifeline.org/chat  Consider psychotherapy as nonpharmacotherapy option. Strongly consider DBT.   Schedule an appointment with me in 3 months or sooner as needed. Call [facility] at [phone] to schedule.  Follow up with primary care provider as planned or for acute medical concerns.  Call the psychiatric nurse line with medication questions or concerns at [phone].  codetaghart may be used to communicate with your provider, but this is not intended to be used for emergencies.    Have previously discussed risks of stimulant medication including, but not limited to, decreased appetite, risk of tics (and that they may be lasting), trouble sleeping, cardiac risks such as increased heart rate and blood pressure, and rare risk of sudden cardiac death.  Also risk of addiction/tolerance/dependence.      Patient Education   Collaborative Care Psychiatry Service  What to Expect  Here's what to expect from your Collaborative Care Psychiatry Service (CCPS).   About CCPS  CCPS means 2 people work together to help you get better. You'll meet with a behavioral health clinician and a psychiatric doctor. A behavioral health clinician helps people with mental health problems by talking with them. A psychiatric doctor helps people by giving them medicine.  How it works  At every visit, you'll see the behavioral  "health clinician (BHC) first. They'll talk with you about how you're doing and teach you how to feel better.   Then you'll see the psychiatric doctor. This doctor can help you deal with troubling thoughts and feelings by giving you medicine. They'll make sure you know the plan for your care.   CCPS usually takes 3 to 6 visits. If you need more visits, we may have you start seeing a different psychiatric doctor for ongoing care.  If you have any questions or concerns, we'll be glad to talk with you.  About visits  Be open  At your visits, please talk openly about your problems. It may feel hard, but it's the best way for us to help you.  Cancelling visits  If you can't come to your visit, please call us right away at [phone]. If you don't cancel at least 24 hours (1 full day) before your visit, that's \"late cancellation.\"  Being late to visits  Being very late is the same as not showing up. You will be a \"no show\" if:  Your appointment starts with a BHC, and you're more than 15 minutes late for a 30-minute (half hour) visit. This will also cancel your appointment with the psychiatric doctor.  Your appointment is with a psychiatric doctor only, and you're more than 15 minutes late for a 30-minute (half hour) visit.  Your appointment is with a psychiatric doctor only, and you're more than 30 minutes late for a 60-minute (full hour) visit.  If you cancel late or don't show up 2 times within 6 months, we may end your care.   Getting help between visits  If you need help between visits, you can call us Monday to Friday from 8 a.m. to 4:30 p.m. at [phone].  Emergency care  Call 911 or go to the nearest emergency department if your life or someone else's life is in danger.  Call 908 anytime to reach the national Suicide and Crisis hotline.  Medicine refills  To refill your medicine, call your pharmacy. You can also call [facility]'s Behavioral Access at [phone], Monday to Friday, 8 a.m. to 4:30 " p.m. It can take 1 to 3 business days to get a refill.   Forms, letters, and tests  You may have papers to fill out, like FMLA, short-term disability, and workability. We can help you with these forms at your visits, but you must have an appointment. You may need more than 1 visit for this, to be in an intensive therapy program, or both.  Before we can give you medicine for ADHD, we may refer you to get tested for it or confirm it another way.  We may not be able to give you an emotional support animal letter.  We don't do mental health checks ordered by the court.   We don't do mental health testing, but we can refer you to get tested.   Thank you for choosing us for your care.  For informational purposes only. Not to replace the advice of your health care provider. Copyright   2022 [facility]. All rights reserved. [employer] 378401 - Rev 11/24.

## 2025-03-20 NOTE — PROGRESS NOTES
"Virtual Visit Details    Type of service:  Video Visit     Originating Location (pt. Location): {video visit patient location:386313::\"Home\"}  {PROVIDER LOCATION On-site should be selected for visits conducted from your clinic location or adjoining [facility] hospital, academic office, or other nearby [facility] building. Off-site should be selected for all other provider locations, including home:[address]}  Distant Location (provider location):  {virtual location provider:022613}  Platform used for Video Visit: {Virtual Visit Platforms:416213::\"PersistIQ\"}  "

## 2025-03-20 NOTE — PROGRESS NOTES
"Telemedicine Visit: The patient's condition can be safely assessed and treated via synchronous audio and visual telemedicine encounter.      Reason for Telemedicine Visit: Patient has requested telehealth visit    Originating Site (Patient Location): Pt's parked car in [place]     Distant Location (provider location):  Off-Site    Consent:  The patient/guardian has verbally consented to: the potential risks and benefits of telemedicine (video visit) versus in person care; bill my insurance or make self-payment for services provided; and responsibility for payment of non-covered services.     Mode of Communication:  Video Conference via [employer]    As the provider I attest to compliance with applicable laws and regulations related to telemedicine.           Outpatient Psychiatric Progress Note    Name: Brittany LOWE Nevin   : 1965                    Primary Care Provider: Esther Armstrong MD   Therapist: None    PHQ-9 scores:      10/24/2023     3:18 PM 2024     7:28 AM 10/18/2024     9:32 AM   PHQ-9 SCORE   PHQ-9 Total Score MyChart 14 (Moderate depression) 12 (Moderate depression) 11 (Moderate depression)   PHQ-9 Total Score 14 12    12 11    11       GALE-7 scores:      7/10/2024     9:10 AM 10/18/2024     9:32 AM 3/20/2025    12:56 PM   GALE-7 SCORE   Total Score 18 (severe anxiety) 15 (severe anxiety) 12 (moderate anxiety)   Total Score 18    18 15    15 12        Patient-reported       Patient Identification:  Patient is a 59 year old,   White Not  or  female  who presents for return visit with me.  Patient is currently employed full time. Patient attended the phone/video session alone. Patient prefers to be called: \"Veronica\".    Interim History:  I last saw Brittany Rooney for outpatient psychiatry return visit on 10/18/2024. During that appointment, we:    Continue Adderall IR 10 mg IR daily as needed for ADHD.  Continue Adderall XR at 30 mg daily as needed for " "ADHD.  Re-start/Continue fluoxetine 40 mg daily for mood and anxiety (ok to take every other day for 1-2 weeks when restarting)  Continue lamotrigine 150 mg twice daily, or 300 mg once daily for mood.  Continue to monitor for any rashes.  Continue all other cares per primary care provider.     3/20: Pt doing well. Feels stable on current medications. Notices positive difference when taking consistently. No negative side effects. Much less tearful/emotional back on Prozac. No chest pain or racing heart. No acute safety concerns. No SI. No drug or alcohol use. See [facility] note below for additional info. Sleeping well.     Per [facility], Milagro Melgar, [facility], during today's team-based visit:  Current Stressors / Issues:  MH update:  Much better.  Consistently been on meds.  Recognized how important this was.  Louder? Not new intrusive thoughts bizarre things- thoughts to shop lift, eating a lot/more sugar, driving into car (back into October), increased in money spending.  Mood is good.  Denies concerns of [place]. Feeling hopeful.   Stresses:  feeling better with spring  Appetite: n/a  Sleep: no concern  Outpatient Provider updates: Denies  SI/SIB/HI:  Denies current.  one previous attempt ETOH/cut wrists in 2021.  GOLDY:  n/a  Side effects/compliance:  Interventions:  [facility] engaged in sx and goal exploration.  Sent info on Mindfulness.    Most important:  Not sure that she wants to change meds.  Mood feels really stable.      Past Psychiatric Med Trials:  Psych Meds at Intake:  Adderall XR 20 mg daily   fluoxetine 40 mg daily - \"I don't know if it does anything for me\" increased to 40 mg a year ago, no side effects  Quetiapine 25 mg at bedtime  - on 2 weeks, not sure if feel different      Past Psych Meds:  Sertraline - bad on stomach  Wellbutrin     Psychiatric ROS:  Brittany Rooney reports mood has been: See HPI above  Anxiety has been: See HPI above  Sleep has been: See HPI above  [place] sxs: None  Psychosis sxs: None  ADHD/ADD " sxs: See HPI above  PTSD sxs: NA  PHQ9 and GAD7 scores were reviewed today if completed.   Medication side effects: Denies  Current stressors include: Symptoms and see HPI above  Coping mechanisms and supports include: Family, Hobbies, and Friends    Current medications include:   Current Outpatient Medications   Medication Sig Dispense Refill    amphetamine-dextroamphetamine (ADDERALL XR) 30 MG 24 hr capsule Take 1 capsule (30 mg) by mouth daily. 30 capsule 0    amphetamine-dextroamphetamine (ADDERALL) 10 MG tablet Take 1 tablet (10 mg) by mouth daily. 30 tablet 0    benzonatate (TESSALON) 100 MG capsule Take 1 capsule (100 mg) by mouth 3 times daily as needed for cough 30 capsule 0    Cholecalciferol (VITAMIN D) 2000 UNIT tablet Take 1 tablet by mouth 2 times daily. 100 tablet 6    clindamycin (CLEOCIN) 2 % vaginal cream Place 1 applicator vaginally at bedtime. 40 g 1    clobetasol (TEMOVATE) 0.05 % external ointment Apply topically 2 times daily. +++ appointment needed for additional refills +++ 15 g 0    clobetasol (TEMOVATE) 0.05 % external ointment APPLY TOPICALLY TO THE AFFECTED AREA TWICE DAILY (Patient not taking: Reported on 7/10/2023) 30 g 0    FLUoxetine (PROZAC) 40 MG capsule Take 1 capsule (40 mg) by mouth daily. 90 capsule 1    IBUPROFEN 200 MG OR CAPS 2 CAPSULE EVERY 4 TO 6 HOURS AS NEEDED      lamoTRIgine (LAMICTAL) 150 MG tablet Take 2 tablets (300 mg) by mouth daily. 180 tablet 1    levothyroxine (SYNTHROID/LEVOTHROID) 125 MCG tablet Take 1 tablet (125 mcg) by mouth daily. 90 tablet 1    mometasone (ELOCON) 0.1 % external cream Apply  topically as needed. 30 g 0     No current facility-administered medications for this visit.       The [place] Prescription Monitoring Program has been reviewed and there are no concerns about diversionary activity for controlled substances at this time.   02/12/2025 02/07/2025 2 Dextroamp-Amphetamin 10 Mg Tab 30.00 30 Al Bau [id number] Hy- (3330) 0/0  [place] Ins [place]    02/08/2025 02/07/2025 2 Dextroamp-Amphet Er 30 Mg Cap 30.00 30 Al Bau [id number] Hy- (9000) 0/0  [place]   01/07/2025 01/07/2025 2 Dextroamp-Amphetamin 10 Mg Tab 30.00 30 Al u [id number] Hy- (9000) 0/0  [place]   01/07/2025 01/07/2025 2 Dextroamp-Amphet Er 30 Mg Cap 30.00 30 Al u [id number] Hy- (9000) 0/0  [place]       Past Medical/Surgical History:  Past Medical History:   Diagnosis Date    Abnormal Pap smear of cervix 01/22/2019    see problem list    Attention deficit disorder with hyperactivity(314.01)     Major depressive disorder, recurrent episode, unspecified     Morbid obesity (H)     Nontoxic uninodular goiter     Other malaise and fatigue     Other malaise and fatigue     Plantar fascial fibromatosis     Thyroid nodule       has a past medical history of Abnormal Pap smear of cervix (01/22/2019), Attention deficit disorder with hyperactivity(314.01), Major depressive disorder, recurrent episode, unspecified, Morbid obesity (H), Nontoxic uninodular goiter, Other malaise and fatigue, Other malaise and fatigue, Plantar fascial fibromatosis, and Thyroid nodule.    She has no past medical history of Arthritis, Cancer (H), Cerebral infarction (H), Congestive heart failure (H), COPD (chronic obstructive pulmonary disease) (H), Depressive disorder, Diabetes (H), Heart disease, History of blood transfusion, Hypertension, or Uncomplicated asthma.    Social History:  Reviewed. No changes to social history except as noted above in HPI.    Vital Signs:   None. This is phone/video visit.     Labs:  Most recent laboratory results reviewed and no new labs.     Review of Systems:  10 systems (general, cardiovascular, respiratory, eyes, ENT, endocrine, GI, , M/S, neurological) were reviewed. Most pertinent finding(s) is/are:  denies fever, cough, persistent headaches, shortness of breath, chest pain, severe GI symptoms, trouble urinating, severe pain. The remaining systems are all unremarkable.    Mental Status  Examination (limited as this is by phone/video):  Appearance: Awake, alert, appears stated age, no acute distress, well-groomed   Attitude:  cooperative, pleasant   Motor: No gross abnormalities observed via video, not formally tested   Oriented to:  person, place, time, and situation  Attention Span and Concentration:  normal  Speech:  clear, coherent, regular rate, rhythm, and volume  Language: intact  Mood: better, good  Affect:  appropriate and in normal range and mood congruent  Associations:  no loose associations  Thought Process:  logical, linear and goal oriented  Thought Content:  no evidence of suicidal ideation or homicidal ideation, no evidence of psychotic thought, no auditory hallucinations present and no visual hallucinations present  Recent and Remote Memory:  Intact to interview. Not formally assessed. No amnesia.  Fund of Knowledge: appropriate  Insight:  good  Judgment:  intact, adequate for safety  Impulse Control:  intact    Suicide Risk Assessment:  Today Brittany Rooney reports no suicidal ideation. Based on all available evidence including the factors cited above, Brittany Rooney does not appear to be at imminent risk for self-harm, does not meet criteria for a 72-hr hold, and therefore remains appropriate for ongoing outpatient level of care.  A thorough assessment of risk factors related to suicide and self-harm have been reviewed and are noted above. The patient convincingly denies suicidality on several occasions. Local community safety resources reviewed for patient to use if needed. There was no deceit detected, and the patient presented in a manner that was believable.     DSM5 Diagnosis:  Attention-Deficit/Hyperactivity Disorder  314.01 (F90.9) Unspecified Attention -Deficit / Hyperactivity Disorder  Mood Disorder, Unspecified  Anxiety, Unspecified    Medical comorbidities include:   Patient Active Problem List    Diagnosis Date Noted    ASCUS of cervix with negative high  risk HPV 01/22/2019     Priority: Medium     2013, 2016 NIL paps  1/22/19 ASCUS pap, neg HPV. Plan: cotest in 3 years.  7/10/23 LSIL, +HR HPV (not 16/18). Plan: cotest in 1 year due 07/10/24  07/17/23 Left msg and [employer] result note sent. Pt notified via [employer]  6/25/24 Reminder [employer]  7/23/24 Reminder call -- left message  8/21/24 Lost to follow-up for pap tracking           Eczema of both hands 12/22/2016     Priority: Medium    Seborrheic keratosis 11/17/2015     Priority: Medium    Vitamin D deficiency disease 02/25/2013     Priority: Medium    Contact dermatitis 02/19/2013     Priority: Medium    Obesity 02/19/2013     Priority: Medium    Adjustment disorder with anxiety 06/11/2011     Priority: Medium    ADD (attention deficit disorder) 06/05/2009     Priority: Medium    Hyperlipidemia LDL goal <130 12/03/2008     Priority: Medium    Hypothyroidism 11/25/2008     Priority: Medium    Malaise and fatigue      Priority: Medium     Problem list name updated by automated process. Provider to review         Psychosocial & Contextual Factors: see HPI above    Assessment:  From Intake, 8/11/2023:  Brittany Rooney is a 58 yo with past psychiatric hx including depression, anxiety, and ADHD who presents today for psychiatric evaluation. Pt with long hx of mental health struggles. Struggling significantly with mood regulation lately and has questioned possible mood disorder vs borderline personality disorder (BPD). Pt with many features of BPD after discussion of criteria and review of her hx and current struggles. Pt will read more about the dx. Pt agreeable to start a trial with lamotrigine to see if helpful for mood stabilization. Also recommend DBT and resources provided today. If lamotrigine really helpful, could consider tapering off fluoxetine. Passive thoughts of death/suicide but no plan or intent to harm self. Denies current problematic drug or alcohol use. Uses cannabis but stopped using alcohol about  4-6 weeks ago.      9/22/2023:  Discussed patient may benefit from Vyvanse trial over Adderall XR.  We will start trial with Vyvanse 40 mg daily in place of Adderall XR dose to see if more effective and better tolerated.  Could consider continued titration of lamotrigine at next visit.  Strongly recommend therapy/consideration for DBT.  Resources previously sent for DBT.  No acute safety concerns.  No SI.  No problematic drug or alcohol use.    2/9/2024:  Patient overall appreciating effects of lamotrigine.  Patient would like to continue to optimize lamotrigine since it does help stabilize mood and irritability some.  No negative side effects.  Patient will continue at 100 mg daily for the next 2 weeks and then increase to 150 mg for 2 weeks then 200 mg daily.  Patient also feeling like Vyvanse 40 mg has had some waning effects.  No chest pains, racing heart, or tics.  We will increase to 50 mg daily to see if more helpful and if effects last a little bit longer than lunchtime.  Discussed needing blood pressure readings, especially with increased dose of Vyvanse.  No acute safety concerns.  No SI.  No problematic drug or alcohol use.    7/10/2023:  Patient overall struggling, particularly with ADHD symptoms.  We discussed alternative pharmacies such as [facility] and mail order pharmacy.  Patient prefers to switch back to Adderall to see if her local pharmacy can obtain the medication.  Due to some decreased effects of lamotrigine, we will increase up to 300 mg daily.  Patient encouraged to still watch for any rashes.  No acute safety concerns.  No SI.  No problematic drug or alcohol use.    10/18/2024:  Patient struggling a bit more off fluoxetine.  Has continued on lamotrigine.  Has also struggled to consistently obtain Adderall.  We will go ahead and try a different pharmacy for Adderall.  We will also send fluoxetine to mail order pharmacy to see if patient can get more consistent  with medications.  No acute safety concerns.  No SI.  No problematic drug or alcohol use.    3/20/2025:  Patient overall doing quite well.  Feels stable on current psychiatric medications.  Tolerating well with no major negative side effects.  Discussed some of the intrusive thoughts.  Patient feels like this is something that has been going on for as long as she can remember.  Just recognizing it more now due to other symptoms being significantly improved and stable.  We will continue to monitor.  Denies concerns for [place].  Patient sleeping well.  Functioning well.  No acute safety concerns.  No SI.  No problematic drug or alcohol use.    Medication side effects and alternatives were reviewed. Health promotion activities recommended and reviewed today. All questions addressed. Education and counseling completed regarding risks and benefits of medications and psychotherapy options. Recommend therapy for additional support.     Treatment Plan:  Continue Adderall IR 10 mg IR daily as needed for ADHD.  Continue Adderall XR at 30 mg daily as needed for ADHD.  Continue fluoxetine 40 mg daily for mood and anxiety.  Continue lamotrigine 150 mg twice daily, or 300 mg once daily for mood.    Continue all other cares per primary care provider.   Continue all other medications as reviewed per electronic medical record today.   Safety plan reviewed. To the Emergency Department as needed or call after hours crisis line at [phone] or [phone]. [place] Crisis Text Line. Text [place] to 472122 or Suicide LifeLine Chat: suicidepreventionlifeline.org/chat  Consider psychotherapy as nonpharmacotherapy option. Strongly consider DBT.   Schedule an appointment with me in 3 months or sooner as needed. Call [facility] at [phone] to schedule.  Follow up with primary care provider as planned or for acute medical concerns.  Call the psychiatric nurse line with medication questions or concerns at  [phone].  [employer] may be used to communicate with your provider, but this is not intended to be used for emergencies.    Have previously discussed risks of stimulant medication including, but not limited to, decreased appetite, risk of tics (and that they may be lasting), trouble sleeping, cardiac risks such as increased heart rate and blood pressure, and rare risk of sudden cardiac death.  Also risk of addiction/tolerance/dependence.      Administrative Billing:   Phone Call/Video Duration: 10 Minutes  Start: 4:02p  Stop: 4:12p    The longitudinal plan of care for the diagnosis(es)/condition(s) as documented were addressed during this visit. Due to the added complexity in care, I will continue to support Veronica in the subsequent management and with ongoing continuity of care.    Episode of Care #: 6    Patient Status:  Patient will continue to be seen for ongoing consultation and stabilization.    Signed:   Jossy Calle DO  [facility]    Disclaimer: This note consists of symbols derived from keyboarding, dictation and/or voice recognition software. As a result, there may be errors in the script that have gone undetected. Please consider this when interpreting information found in this chart.

## 2025-05-06 ENCOUNTER — MYC REFILL (OUTPATIENT)
Dept: PSYCHIATRY | Facility: CLINIC | Age: 60
End: 2025-05-06
Payer: COMMERCIAL

## 2025-05-06 DIAGNOSIS — F90.9 ATTENTION DEFICIT HYPERACTIVITY DISORDER (ADHD), UNSPECIFIED ADHD TYPE: ICD-10-CM

## 2025-05-06 RX ORDER — DEXTROAMPHETAMINE SACCHARATE, AMPHETAMINE ASPARTATE, DEXTROAMPHETAMINE SULFATE AND AMPHETAMINE SULFATE 2.5; 2.5; 2.5; 2.5 MG/1; MG/1; MG/1; MG/1
10 TABLET ORAL DAILY
Refills: 0 | OUTPATIENT
Start: 2025-05-06

## 2025-05-06 NOTE — TELEPHONE ENCOUNTER
1) Reviewed refill request(s) from      [facility] [address] [place] [address].   2) Any Controlled Substance(s)? Yes   [place]  checked? N/A    3) Refill(s) requested for:     - amphetamine-dextroamphetamine (ADDERALL) 10 MG tablet  Date last ordered: 04/19/2025 Qty: 30 Refills: 0   refill has been requested too soon and pharmacy should have refill(s) on file        4) Action taken: spoke with pharmacy staff stated that three is a refill on file and it is too soon for refill. and routed encounter back to RN Pool for review; not within LPN/MA Scope of Practice to deny.    Josi Cartwright MA on 5/6/2025 at 2:50 PM

## 2025-05-06 NOTE — TELEPHONE ENCOUNTER
RN reviewed request and concur. There are refills available for both:    amphetamine-dextroamphetamine (ADDERALL) 10 MG tablets and     amphetamine-dextroamphetamine (ADDERALL XR) 30 MG 24 hr capsule      With start dates of 5/19/2025.    RN denied refill request, already has refills on file.     RN sent MyC message to patient with the above information.    Carolyn Cordero RN on 5/6/2025 at 3:55 PM

## 2025-07-22 DIAGNOSIS — F90.9 ATTENTION DEFICIT HYPERACTIVITY DISORDER (ADHD), UNSPECIFIED ADHD TYPE: Primary | ICD-10-CM

## 2025-07-22 RX ORDER — DEXTROAMPHETAMINE SACCHARATE, AMPHETAMINE ASPARTATE, DEXTROAMPHETAMINE SULFATE AND AMPHETAMINE SULFATE 2.5; 2.5; 2.5; 2.5 MG/1; MG/1; MG/1; MG/1
10 TABLET ORAL 2 TIMES DAILY
Qty: 60 TABLET | Refills: 0 | Status: SHIPPED | OUTPATIENT
Start: 2025-07-22

## 2025-07-22 RX ORDER — DEXTROAMPHETAMINE SACCHARATE, AMPHETAMINE ASPARTATE MONOHYDRATE, DEXTROAMPHETAMINE SULFATE AND AMPHETAMINE SULFATE 7.5; 7.5; 7.5; 7.5 MG/1; MG/1; MG/1; MG/1
30 CAPSULE, EXTENDED RELEASE ORAL DAILY
Qty: 30 CAPSULE | Refills: 0 | Status: SHIPPED | OUTPATIENT
Start: 2025-07-22

## 2025-07-22 NOTE — TELEPHONE ENCOUNTER
Date of Last Office Visit: 3/20/25  Date of Next Office Visit: None; routing for A to assist pt with scheduling.    No shows since last visit: No  More than one patient-initiated cancellation (with reschedule) since last seen in clinic? No    []Medication refilled per  Medication Refill in Ambulatory Care  policy.  [x]Scope of Practice: refill request processed by LPN/MA  []Medication unable to be refilled by RN due to criteria not met as indicated below:    []Eligibility: has not had a provider visit within last 6 months   []Supervision: no future appointment; < 7 days before next appointment   []Compliance: no shows; cancellations; lapse in therapy   []Verification: order discrepancy; may need modification...   [] > 30-day supply request   []Advanced refill request: > 7 days before refill date   [x]Controlled medication   []Medication not included in policy   []Review: new med; med adjusted <= 30 days; safety alert; requires lab monitoring...   []Other:      Medication(s) requested:    Disp Refills Start End MITA   amphetamine-dextroamphetamine (ADDERALL) 10 MG tablet 30 tablet 0 5/19/2025 6/18/2025     Appropriate for refill? Yes     Disp Refills Start End MITA   amphetamine-dextroamphetamine (ADDERALL XR) 30 MG 24 hr capsule 30 capsule 0 5/19/2025 6/18/2025 --   Appropriate for refill? Yes    Any Controlled Substance(s)? Yes   [place]     [place] ?: No    Requested medication(s) verified as identical to current order? Yes    Any lapse in adherence to medication(s) greater than 5 days? No      Additional action taken? routed encounter to provider for review.      Last visit treatment plan:   Treatment Plan:  Continue Adderall IR 10 mg IR daily as needed for ADHD.  Continue Adderall XR at 30 mg daily as needed for ADHD.  Continue fluoxetine 40 mg daily for mood and anxiety.  Continue lamotrigine 150 mg twice daily, or 300 mg once daily for mood.    Continue all other cares  per primary care provider.   Continue all other medications as reviewed per electronic medical record today.   Safety plan reviewed. To the Emergency Department as needed or call after hours crisis line at [phone] or [phone]. [place] Crisis Text Line. Text [place] to 403159 or Suicide LifeLine Chat: suicidepreventionlifeline.org/chat  Consider psychotherapy as nonpharmacotherapy option. Strongly consider DBT.   Schedule an appointment with me in 3 months or sooner as needed. Call [facility] at [phone] to schedule.  Follow up with primary care provider as planned or for acute medical concerns.  Call the psychiatric nurse line with medication questions or concerns at [phone].  [employer] may be used to communicate with your provider, but this is not intended to be used for emergencies.    Is lab monitoring required? No    Jacqueline Long MA on 7/22/2025 at 9:56 AM

## 2025-07-30 ENCOUNTER — MYC REFILL (OUTPATIENT)
Dept: PSYCHIATRY | Facility: CLINIC | Age: 60
End: 2025-07-30
Payer: COMMERCIAL

## 2025-07-30 DIAGNOSIS — F39 MOOD DISORDER: ICD-10-CM

## 2025-07-30 DIAGNOSIS — E03.4 HYPOTHYROIDISM DUE TO ACQUIRED ATROPHY OF THYROID: ICD-10-CM

## 2025-07-30 DIAGNOSIS — F41.9 ANXIETY: ICD-10-CM

## 2025-07-30 RX ORDER — FLUOXETINE HYDROCHLORIDE 40 MG/1
40 CAPSULE ORAL DAILY
Qty: 90 CAPSULE | Refills: 1 | Status: SHIPPED | OUTPATIENT
Start: 2025-07-30

## 2025-07-30 RX ORDER — LEVOTHYROXINE SODIUM 125 UG/1
125 TABLET ORAL DAILY
Qty: 90 TABLET | Refills: 0 | Status: SHIPPED | OUTPATIENT
Start: 2025-07-30

## 2025-07-30 RX ORDER — LAMOTRIGINE 150 MG/1
300 TABLET ORAL DAILY
Qty: 180 TABLET | Refills: 1 | Status: SHIPPED | OUTPATIENT
Start: 2025-07-30

## 2025-07-30 NOTE — TELEPHONE ENCOUNTER
Date of Last Office Visit: 3/20/2025  Date of Next Office Visit: 8/12/2025  No shows since last visit: No  More than one patient-initiated cancellation (with reschedule) since last seen in clinic? No    []Medication refilled per  Medication Refill in Ambulatory Care  policy.  [x]Scope of Practice: refill request processed by LPN/MA  []Medication unable to be refilled by RN due to criteria not met as indicated below:    []Eligibility: has not had a provider visit within last 6 months   []Supervision: no future appointment; < 7 days before next appointment   []Compliance: no shows; cancellations; lapse in therapy   []Verification: order discrepancy; may need modification...   [] > 30-day supply request   []Advanced refill request: > 7 days before refill date   []Controlled medication   []Medication not included in policy   []Review: new med; med adjusted <= 30 days; safety alert; requires lab monitoring...   []Other:      Medication(s) requested:     -  lamoTRIgine (LAMICTAL) 150 MG tablet   Date last ordered: 2/7/2025  Qty: 180  Refills: 1  Appropriate for refill? Provider to review.      -  FLUoxetine (PROZAC) 40 MG capsule   Date last ordered: 2/7/2025  Qty: 90  Refills: 1  Appropriate for refill? Provider to review.         Any Controlled Substance(s)? No      Requested medication(s) verified as identical to current order? Yes    Any lapse in adherence to medication(s) greater than 5 days? No      Additional action taken? routed encounter to provider for review.      Last visit treatment plan:   Treatment Plan:  Continue Adderall IR 10 mg IR daily as needed for ADHD.  Continue Adderall XR at 30 mg daily as needed for ADHD.  Continue fluoxetine 40 mg daily for mood and anxiety.  Continue lamotrigine 150 mg twice daily, or 300 mg once daily for mood.    Continue all other cares per primary care provider.   Continue all other medications as reviewed per electronic medical record today.   Safety plan reviewed. To the  "Emergency Department as needed or call after hours crisis line at [phone] or [phone]. [place] Crisis Text Line. Text [place] to 648854 or Suicide LifeLine Chat: suicidepreventionlifeline.org/chat  Consider psychotherapy as nonpharmacotherapy option. Strongly consider DBT.   Schedule an appointment with me in 3 months or sooner as needed. Call [facility] at [phone] to schedule.  Follow up with primary care provider as planned or for acute medical concerns.  Call the psychiatric nurse line with medication questions or concerns at [phone].  [employer] may be used to communicate with your provider, but this is not intended to be used for emergencies.    Is lab monitoring required? Yes LAMOTRIGINE   Last Lamotrigine Level: No results found for: \"GEORGE\"  Last CMP/LFT Lab Values:   Sodium   Date Value Ref Range Status   09/06/2024 140 135 - 145 mmol/L Final     Potassium   Date Value Ref Range Status   09/06/2024 4.2 3.4 - 5.3 mmol/L Final     Chloride   Date Value Ref Range Status   09/06/2024 104 98 - 107 mmol/L Final     Carbon Dioxide (CO2)   Date Value Ref Range Status   09/06/2024 25 22 - 29 mmol/L Final     Anion Gap   Date Value Ref Range Status   09/06/2024 11 7 - 15 mmol/L Final     Urea Nitrogen   Date Value Ref Range Status   09/06/2024 18.4 6.0 - 20.0 mg/dL Final     Creatinine   Date Value Ref Range Status   09/06/2024 0.69 0.51 - 0.95 mg/dL Final     GFR Estimate   Date Value Ref Range Status   09/06/2024 >90 >60 mL/min/1.73m2 Final     Comment:     eGFR calculated using 2021 CKD-EPI equation.     Calcium   Date Value Ref Range Status   09/06/2024 9.3 8.8 - 10.4 mg/dL Final     Comment:     Reference intervals for this test were updated on 7/16/2024 to reflect our healthy population more accurately. There may be differences in the flagging of prior results with similar values performed with this method. Those prior results can be interpreted in the context of the updated " reference intervals.     Glucose   Date Value Ref Range Status   09/06/2024 104 (H) 70 - 99 mg/dL Final     Alkaline Phosphatase   Date Value Ref Range Status   09/06/2024 79 40 - 150 U/L Final     AST   Date Value Ref Range Status   09/06/2024 25 0 - 45 U/L Final     ALT   Date Value Ref Range Status   09/06/2024 13 0 - 50 U/L Final     Protein Total   Date Value Ref Range Status   09/06/2024 6.8 6.4 - 8.3 g/dL Final     Albumin   Date Value Ref Range Status   09/06/2024 4.4 3.5 - 5.2 g/dL Final     Bilirubin Total   Date Value Ref Range Status   09/06/2024 0.5 <=1.2 mg/dL Final       Ketty Reina MA on 7/30/2025 at 9:51 AM

## 2025-08-27 ENCOUNTER — MYC MEDICAL ADVICE (OUTPATIENT)
Dept: PSYCHIATRY | Facility: CLINIC | Age: 60
End: 2025-08-27
Payer: COMMERCIAL

## 2025-08-27 DIAGNOSIS — F90.9 ATTENTION DEFICIT HYPERACTIVITY DISORDER (ADHD), UNSPECIFIED ADHD TYPE: ICD-10-CM

## 2025-08-28 RX ORDER — DEXTROAMPHETAMINE SACCHARATE, AMPHETAMINE ASPARTATE, DEXTROAMPHETAMINE SULFATE AND AMPHETAMINE SULFATE 2.5; 2.5; 2.5; 2.5 MG/1; MG/1; MG/1; MG/1
10 TABLET ORAL 2 TIMES DAILY
Qty: 60 TABLET | Refills: 0 | Status: CANCELLED | OUTPATIENT
Start: 2025-08-28

## 2025-08-28 RX ORDER — DEXTROAMPHETAMINE SACCHARATE, AMPHETAMINE ASPARTATE MONOHYDRATE, DEXTROAMPHETAMINE SULFATE AND AMPHETAMINE SULFATE 7.5; 7.5; 7.5; 7.5 MG/1; MG/1; MG/1; MG/1
30 CAPSULE, EXTENDED RELEASE ORAL DAILY
Qty: 30 CAPSULE | Refills: 0 | Status: CANCELLED | OUTPATIENT
Start: 2025-08-28

## (undated) DEVICE — SOL WATER IRRIG 1000ML BOTTLE 07139-09

## (undated) RX ORDER — SIMETHICONE 40MG/0.6ML
SUSPENSION, DROPS(FINAL DOSAGE FORM)(ML) ORAL
Status: DISPENSED
Start: 2018-11-12

## (undated) RX ORDER — FENTANYL CITRATE 50 UG/ML
INJECTION, SOLUTION INTRAMUSCULAR; INTRAVENOUS
Status: DISPENSED
Start: 2018-11-12